# Patient Record
Sex: FEMALE | Race: WHITE | NOT HISPANIC OR LATINO | Employment: OTHER | URBAN - METROPOLITAN AREA
[De-identification: names, ages, dates, MRNs, and addresses within clinical notes are randomized per-mention and may not be internally consistent; named-entity substitution may affect disease eponyms.]

---

## 2017-12-12 ENCOUNTER — HISTORICAL (OUTPATIENT)
Dept: ADMINISTRATIVE | Facility: HOSPITAL | Age: 62
End: 2017-12-12

## 2018-03-01 ENCOUNTER — HISTORICAL (OUTPATIENT)
Dept: LAB | Facility: HOSPITAL | Age: 63
End: 2018-03-01

## 2018-03-01 LAB
ABS NEUT (OLG): 3.93 X10(3)/MCL (ref 2.1–9.2)
APTT PPP: 27.5 SECOND(S) (ref 24.8–36.9)
BASOPHILS # BLD AUTO: 0 X10(3)/MCL (ref 0–0.2)
BASOPHILS NFR BLD AUTO: 0 %
EOSINOPHIL # BLD AUTO: 0.1 X10(3)/MCL (ref 0–0.9)
EOSINOPHIL NFR BLD AUTO: 1 %
ERYTHROCYTE [DISTWIDTH] IN BLOOD BY AUTOMATED COUNT: 12.1 % (ref 11.5–17)
HCT VFR BLD AUTO: 40.3 % (ref 37–47)
HGB BLD-MCNC: 13.1 GM/DL (ref 12–16)
INR PPP: 1.07 (ref 0–1.27)
LYMPHOCYTES # BLD AUTO: 1.9 X10(3)/MCL (ref 0.6–4.6)
LYMPHOCYTES NFR BLD AUTO: 29 %
MCH RBC QN AUTO: 32 PG (ref 27–31)
MCHC RBC AUTO-ENTMCNC: 32.5 GM/DL (ref 33–36)
MCV RBC AUTO: 98.3 FL (ref 80–94)
MONOCYTES # BLD AUTO: 0.5 X10(3)/MCL (ref 0.1–1.3)
MONOCYTES NFR BLD AUTO: 8 %
NEUTROPHILS # BLD AUTO: 3.93 X10(3)/MCL (ref 1.4–7.9)
NEUTROPHILS NFR BLD AUTO: 61 %
PLATELET # BLD AUTO: 122 X10(3)/MCL (ref 130–400)
PMV BLD AUTO: 12.3 FL (ref 9.4–12.4)
PROTHROMBIN TIME: 14.2 SECOND(S) (ref 12.2–14.7)
RBC # BLD AUTO: 4.1 X10(6)/MCL (ref 4.2–5.4)
WBC # SPEC AUTO: 6.4 X10(3)/MCL (ref 4.5–11.5)

## 2018-03-05 ENCOUNTER — HISTORICAL (OUTPATIENT)
Dept: ADMINISTRATIVE | Facility: HOSPITAL | Age: 63
End: 2018-03-05

## 2019-06-03 ENCOUNTER — HISTORICAL (OUTPATIENT)
Dept: ANESTHESIOLOGY | Facility: HOSPITAL | Age: 64
End: 2019-06-03

## 2019-10-01 ENCOUNTER — HISTORICAL (OUTPATIENT)
Dept: URGENT CARE | Facility: CLINIC | Age: 64
End: 2019-10-01

## 2019-10-01 LAB
BILIRUB SERPL-MCNC: NEGATIVE MG/DL
BLOOD URINE, POC: NEGATIVE
CLARITY, POC UA: CLEAR
COLOR, POC UA: YELLOW
GLUCOSE UR QL STRIP: NEGATIVE
KETONES UR QL STRIP: NEGATIVE
LEUKOCYTE EST, POC UA: NORMAL
NITRITE, POC UA: NEGATIVE
PH, POC UA: 5
PROTEIN, POC: NORMAL
SPECIFIC GRAVITY, POC UA: 1.03
UROBILINOGEN, POC UA: NORMAL

## 2020-01-14 ENCOUNTER — HISTORICAL (OUTPATIENT)
Dept: ADMINISTRATIVE | Facility: HOSPITAL | Age: 65
End: 2020-01-14

## 2020-02-19 ENCOUNTER — HISTORICAL (OUTPATIENT)
Dept: SURGERY | Facility: HOSPITAL | Age: 65
End: 2020-02-19

## 2022-04-11 ENCOUNTER — HISTORICAL (OUTPATIENT)
Dept: ADMINISTRATIVE | Facility: HOSPITAL | Age: 67
End: 2022-04-11
Payer: MEDICARE

## 2022-04-29 VITALS
SYSTOLIC BLOOD PRESSURE: 118 MMHG | OXYGEN SATURATION: 95 % | HEIGHT: 60 IN | WEIGHT: 161.38 LBS | DIASTOLIC BLOOD PRESSURE: 76 MMHG | BODY MASS INDEX: 31.68 KG/M2

## 2022-04-30 NOTE — OP NOTE
Patient:   Lachelle Alanis            MRN: [id number]            FIN: [id number]               Age:   64 years     Sex:  Female     :  1955   Associated Diagnoses:   Primary osteoarthritis of right hip   Author:   Alexey Wayne MD      Operative Note   Operative Information   Date/ Time:  10/15/2018 07:38:00.     Procedures Performed: Right hip injection using fluoroscopy.     Preoperative Diagnosis: Primary osteoarthritis of right hip ([id number] M16.11).     Postoperative Diagnosis: Primary osteoarthritis of right hip ([id number] M16.11).     Surgeon: Alexey Wayne MD.     Anesthesia: concious sedation.     Description of Procedure/Findings/    Complications: Patient was involved who complains of ongoing right hip pain.  We discussed all treatment options.  Patient has a history of arthritis to the right hip.  Patient has tried and failed all measures.  The risk, benefits, alternatives to injection of right hip under anesthesia were discussed in detail including but limited to infection, bleeding, scarring, need for revision surgery, and resolution of pain.  Despite these risks patient elected to proceed with surgical mention.    Patient seen in preoperative holding.  The risk benefits alternatives again discussed.  All questions answered no guarantees made.  Patient was marked and consented.  Was taken the operating room.  placed under conscious sedation.  Using fluoroscopic guidance, the insertion site was localized.  It was injected with 3-4 cc 1% lidocaine.  Afterwards an 18-gauge spinal needle was then inserted using fluoroscopic guidance to the superior anterior aspect of the right hip/acetabulum.  2-3 cc of Isovue contrast was then injected.  We had an excellent arthrogram.  Afterwards 2 cc 1% lidocaine and 12 mg of betamethasone was injected into the right hip joint.  Needle was removed.  Band-Aid was placed over the injection site.  Patient arose from anesthesia without issue.  Was  transferred to recovery room in stable condition. postop he will be weightbearing as tolerated.  Patient will be discharged home.  .     Esimated blood loss: loss  10  cc.

## 2022-04-30 NOTE — OP NOTE
DATE OF SURGERY:    03/05/2018    SURGEON:  Alexey Wayne MD    PREOPERATIVE DIAGNOSIS:  Right hip labrum tear.    POSTOPERATIVE DIAGNOSIS:  Right hip labrum tear.    PROCEDURE:  Injection under anesthesia with arthrogram right hip.    INDICATION FOR PROCEDURE:  Patient is a 62-year-old female who presented to my clinic complaining of hip pain.  MRI revealed a labral tear.  Patient had tried and failed all conservative measurements including anti-inflammatory, activity modification,  as well as physical therapy.  She continued to have significant right hip pain.  We discussed treatment options including injection of her right hip labrum to try to calm this groin pain down.  The risks, benefits and alternatives of injection under anesthesia were arthrogram were discussed with the patient.  All questions were answered.  No guarantees were made.  Despite these risks, he elected to proceed with surgical intervention.    PROCEDURE IN DETAIL:  Patient was seen preoperatively in the preoperative holding area.  She was marked and consented for surgery.  She was taken to the operating room and placed under MAC anesthesia.  Right hip was prepped in a normal sterile fashion.  A time out was performed verifying correct patient, site side and procedure.  All were in agreement.  Using x-ray guidance, an 18-gauge spinal needle was inserted after using local anesthetic on the skin was inserted on the anterior femoral neck near the superior aspect of the hip capsule.  1 cc of Isovue were then injected grading an arthrogram.  We got excellent arthrogram with no significant arthritic changes noted afterwards.  12 mg of Betamethasone and 3 cc of 1% Lidocaine were inserted into her hip joint.  Patient tolerated the procedure without any issue.  The needle was removed.  A Band-Aid was placed.  Patient tolerated the procedure without any issues, reversed from anesthesia without any issues.  She was transferred to recovery room  in stable condition, where she will be discharged home.  She will follow up with me in one to two weeks to go over her results.        ______________________________  MD MIRNA Sims/SUN  DD:  03/05/2018  Time:  08:18AM  DT:  03/06/2018  Time:  10:24AM  Job #:  [id number]

## 2022-06-16 RX ORDER — PANTOPRAZOLE SODIUM 20 MG/1
20 TABLET, DELAYED RELEASE ORAL DAILY
COMMUNITY

## 2022-06-16 RX ORDER — METFORMIN HYDROCHLORIDE 500 MG/1
500 TABLET ORAL 2 TIMES DAILY WITH MEALS
Status: ON HOLD | COMMUNITY
End: 2022-12-29 | Stop reason: HOSPADM

## 2022-06-16 RX ORDER — LEVOTHYROXINE SODIUM 88 UG/1
88 TABLET ORAL
COMMUNITY
End: 2023-08-28

## 2022-06-16 RX ORDER — FENOFIBRATE 54 MG/1
54 TABLET ORAL DAILY
COMMUNITY

## 2022-06-16 RX ORDER — SERTRALINE HYDROCHLORIDE 100 MG/1
100 TABLET, FILM COATED ORAL DAILY
COMMUNITY

## 2022-06-16 RX ORDER — GLIMEPIRIDE 2 MG/1
4 TABLET ORAL
COMMUNITY

## 2022-06-16 RX ORDER — ROSUVASTATIN CALCIUM 10 MG/1
10 TABLET, COATED ORAL DAILY
COMMUNITY

## 2022-06-16 RX ORDER — NIACIN 500 MG
1000 CAPSULE, EXTENDED RELEASE ORAL NIGHTLY
COMMUNITY

## 2022-06-16 RX ORDER — OMEGA-3-ACID ETHYL ESTERS 1 G/1
2 CAPSULE, LIQUID FILLED ORAL 2 TIMES DAILY
Status: ON HOLD | COMMUNITY
End: 2022-12-29 | Stop reason: HOSPADM

## 2022-06-16 RX ORDER — METOPROLOL SUCCINATE 25 MG/1
37.5 TABLET, EXTENDED RELEASE ORAL 2 TIMES DAILY
COMMUNITY

## 2022-06-16 RX ORDER — ALENDRONATE SODIUM 70 MG/1
70 TABLET ORAL
COMMUNITY
End: 2023-08-28

## 2022-06-20 DIAGNOSIS — M54.2 CERVICALGIA: Primary | ICD-10-CM

## 2022-06-21 ENCOUNTER — OFFICE VISIT (OUTPATIENT)
Dept: NEUROSURGERY | Facility: CLINIC | Age: 67
End: 2022-06-21
Payer: MEDICARE

## 2022-06-21 VITALS
SYSTOLIC BLOOD PRESSURE: 120 MMHG | DIASTOLIC BLOOD PRESSURE: 70 MMHG | RESPIRATION RATE: 18 BRPM | HEIGHT: 60 IN | WEIGHT: 153.63 LBS | BODY MASS INDEX: 30.16 KG/M2

## 2022-06-21 DIAGNOSIS — M47.22 CERVICAL SPONDYLOSIS WITH RADICULOPATHY: ICD-10-CM

## 2022-06-21 DIAGNOSIS — M50.223 HERNIATED NUCLEUS PULPOSUS, C6-7: Primary | ICD-10-CM

## 2022-06-21 PROCEDURE — 99203 OFFICE O/P NEW LOW 30 MIN: CPT | Mod: ,,, | Performed by: PHYSICIAN ASSISTANT

## 2022-06-21 PROCEDURE — 99203 PR OFFICE/OUTPT VISIT, NEW, LEVL III, 30-44 MIN: ICD-10-PCS | Mod: ,,, | Performed by: PHYSICIAN ASSISTANT

## 2022-06-21 RX ORDER — LANOLIN ALCOHOL/MO/W.PET/CERES
1000 CREAM (GRAM) TOPICAL
COMMUNITY

## 2022-06-21 RX ORDER — UBIDECARENONE 30 MG
200 CAPSULE ORAL DAILY
COMMUNITY

## 2022-06-21 RX ORDER — IBUPROFEN 100 MG/5ML
1000 SUSPENSION, ORAL (FINAL DOSE FORM) ORAL 2 TIMES DAILY
Status: ON HOLD | COMMUNITY
End: 2022-12-29 | Stop reason: HOSPADM

## 2022-06-21 RX ORDER — ERGOCALCIFEROL 1.25 MG/1
50000 CAPSULE ORAL
COMMUNITY
End: 2022-12-20 | Stop reason: CLARIF

## 2022-06-21 RX ORDER — NAPROXEN SODIUM 220 MG/1
162 TABLET, FILM COATED ORAL DAILY
Status: ON HOLD | COMMUNITY
End: 2022-12-29 | Stop reason: HOSPADM

## 2022-06-21 RX ORDER — DILTIAZEM HYDROCHLORIDE 120 MG/1
180 CAPSULE, EXTENDED RELEASE ORAL DAILY
COMMUNITY
Start: 2022-01-25 | End: 2023-08-28

## 2022-06-21 RX ORDER — VITAMIN E 268 MG
400 CAPSULE ORAL DAILY
Status: ON HOLD | COMMUNITY
End: 2022-12-29 | Stop reason: HOSPADM
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
## 2022-06-29 ENCOUNTER — HOSPITAL ENCOUNTER (OUTPATIENT)
Dept: RADIOLOGY | Facility: HOSPITAL | Age: 67
Discharge: HOME | End: 2022-06-29
Attending: PHYSICIAN ASSISTANT
Payer: MEDICARE

## 2022-06-29 DIAGNOSIS — M50.223 HERNIATED NUCLEUS PULPOSUS, C6-7: ICD-10-CM

## 2022-06-29 DIAGNOSIS — M47.22 CERVICAL SPONDYLOSIS WITH RADICULOPATHY: ICD-10-CM

## 2022-06-29 PROCEDURE — 72125 CT NECK SPINE W/O DYE: CPT | Mod: TC

## 2022-07-01 ENCOUNTER — TELEPHONE (OUTPATIENT)
Dept: NEUROSURGERY | Facility: HOSPITAL | Age: 67
End: 2022-07-01
Payer: MEDICARE

## 2022-07-01 DIAGNOSIS — Z78.0 ENCOUNTER FOR OSTEOPOROSIS SCREENING IN ASYMPTOMATIC POSTMENOPAUSAL PATIENT: Primary | ICD-10-CM

## 2022-07-01 DIAGNOSIS — Z13.820 ENCOUNTER FOR OSTEOPOROSIS SCREENING IN ASYMPTOMATIC POSTMENOPAUSAL PATIENT: Primary | ICD-10-CM

## 2022-07-01 NOTE — TELEPHONE ENCOUNTER
I spoke to Dr. Melissa about the patient this week.  He would like to see Cervical x-rays with flex/ext views (ordered), she had the cervical CT done, and bone density.      I discussed this with the pateint.  She would like to go to [facility] for x-rays and bone density testing.  Please arrange prior to her follow up appt.

## 2022-07-05 NOTE — TELEPHONE ENCOUNTER
Faxed XR and BD orders to [facility] with the request that they please be done closer to patient's follow up appt on 08/17.

## 2022-07-14 DIAGNOSIS — R13.10 TROUBLE SWALLOWING: ICD-10-CM

## 2022-07-14 DIAGNOSIS — R05.3 CHRONIC COUGH: Primary | ICD-10-CM

## 2022-07-14 DIAGNOSIS — R47.02 DYSPHASIA: ICD-10-CM

## 2022-07-19 PROCEDURE — 83520 IMMUNOASSAY QUANT NOS NONAB: CPT | Performed by: INTERNAL MEDICINE

## 2022-07-20 ENCOUNTER — LAB REQUISITION (OUTPATIENT)
Dept: LAB | Facility: HOSPITAL | Age: 67
End: 2022-07-20
Payer: MEDICARE

## 2022-07-20 ENCOUNTER — CLINICAL SUPPORT (OUTPATIENT)
Dept: REHABILITATION | Facility: HOSPITAL | Age: 67
End: 2022-07-20
Attending: INTERNAL MEDICINE
Payer: MEDICARE

## 2022-07-20 ENCOUNTER — HOSPITAL ENCOUNTER (OUTPATIENT)
Dept: RADIOLOGY | Facility: HOSPITAL | Age: 67
Discharge: HOME | End: 2022-07-20
Attending: INTERNAL MEDICINE
Payer: MEDICARE

## 2022-07-20 DIAGNOSIS — R13.10 TROUBLE SWALLOWING: ICD-10-CM

## 2022-07-20 DIAGNOSIS — R47.02 DYSPHASIA: ICD-10-CM

## 2022-07-20 DIAGNOSIS — R05.3 CHRONIC COUGH: ICD-10-CM

## 2022-07-20 DIAGNOSIS — R13.10 DYSPHAGIA, UNSPECIFIED TYPE: ICD-10-CM

## 2022-07-20 DIAGNOSIS — K92.1 MELENA: ICD-10-CM

## 2022-07-20 DIAGNOSIS — R19.7 DIARRHEA, UNSPECIFIED: ICD-10-CM

## 2022-07-20 PROCEDURE — 74230 X-RAY XM SWLNG FUNCJ C+: CPT | Mod: TC

## 2022-07-20 PROCEDURE — 92611 MOTION FLUOROSCOPY/SWALLOW: CPT

## 2022-07-20 NOTE — PROGRESS NOTES
Speech Language Pathology Department  Modified Barium Swallow Study    Patient Name:  Lachelle Alanis   MRN:  [id number]    Recommendations:     General Recommendations:  SLP intervention not indicated  Repeat MBS study: as warranted   Diet recommendations:  Regular and thin   Swallow Strategies/Precautions: small bites/sips and slow rate  General Precautions: Standard,      History:     A Modified Barium Swallow Study was completed to assess the efficiency of his/her swallow function, rule out aspiration and make recommendations regarding safe dietary consistencies, effective compensatory strategies, and safe eating environment.     Past Medical History:   Diagnosis Date    Anemia     Anxiety     Cervical pain     Depression     Diabetes     GERD (gastroesophageal reflux disease)     Hyperlipidemia     Unspecified osteoarthritis, unspecified site        Home Diet: Regular and thin liquids  Current Method of Nutrition: PO diet      Patient complaint: coughing with intake     Subjective:     Patient awake and alert.    Respiratory Status: room air     Radiology Staff Present: Dr Leong    Fluoroscopic Results:     Visualization  · Patient was seen in the lateral view  · Patient was seen in the anterior view    Oral Phase:    WFL    Pharyngeal Phase:    WFL  Consistency Laryngeal Penetration Aspiration Residue Strategy   mildly thick liquids  none none     Thin liquids via cup and straw none none     puree none none     Solids  none none                       Assessment:     No laryngeal penetration or aspiration visualized. No skilled SLP intervention is warranted at this time.                   Goals:     If vocal hoarseness persists may want to consider ENT consult.   Plan:     Patient to be seen:      Plan of Care expires:     Plan of Care reviewed with:      SLP Follow-Up:         Time Tracking:     SLP Treatment Date:      Speech Start Time:     Speech Stop Time:        Speech Total Time (min):        Billable minutes: Motion Fluoroscopic Evaluation, Video Recording,         07/20/2022

## 2022-07-23 LAB — ELASTASE PANC STL-MCNT: 198 MCG/G

## 2022-08-17 ENCOUNTER — OFFICE VISIT (OUTPATIENT)
Dept: NEUROSURGERY | Facility: CLINIC | Age: 67
End: 2022-08-17
Payer: MEDICARE

## 2022-08-17 VITALS
WEIGHT: 156.19 LBS | BODY MASS INDEX: 30.67 KG/M2 | HEIGHT: 60 IN | SYSTOLIC BLOOD PRESSURE: 125 MMHG | HEART RATE: 66 BPM | DIASTOLIC BLOOD PRESSURE: 70 MMHG | RESPIRATION RATE: 16 BRPM

## 2022-08-17 DIAGNOSIS — M47.22 CERVICAL SPONDYLOSIS WITH RADICULOPATHY: Primary | ICD-10-CM

## 2022-08-17 PROCEDURE — 99213 PR OFFICE/OUTPT VISIT, EST, LEVL III, 20-29 MIN: ICD-10-PCS | Mod: ,,, | Performed by: PHYSICIAN ASSISTANT

## 2022-08-17 PROCEDURE — 99213 OFFICE O/P EST LOW 20 MIN: CPT | Mod: ,,, | Performed by: PHYSICIAN ASSISTANT

## 2022-08-17 NOTE — PROGRESS NOTES
[facility]  History & Physical  Neurosurgery      Lachelle Alanis   [id number]   1955       CHIEF COMPLAINT:  Neck pain    HPI:  Lachelle Alanis is a 66 y.o. female who presents for neurosurgical evaluation.  The patient continues with neck pain.  Today, she rates her pain at 6/10.  Yesterday, the neck pain was severe and produced a headache.  She also has pain that radiates into the trapezius muscles, left shoulder, and lateral upper arm.  When she was last seen on 6/24/2022, she denied pain in either upper extremity.  She did not associate this pain with her neck.  Her neck pain is the main complaint.  It has been progressively worsening especially after she lifted her grandchild 4-5 months ago.  She is very frustrated with her situation.  She has a great deal of pain when she performs activities that require a lot of cervical range of motion, ie. Shopping.  She is ready to explore surgical options.  She returns today with updated bone density and cervical CT.    Of note, she underwent MBS due to hoarseness.  This was ordered by Dr. Marielle Leigh.  She has hoarseness all of the time.  In addition, she tends to choke of drinks and saliva.  The MBS reveal she has normal swallowing function.  It was recommended she been seen by ENT.  She has had the hoarseness since she underwent thyroidectomy.      Past Medical History:   Diagnosis Date    Anemia     Anxiety     Cervical pain     Depression     Diabetes     GERD (gastroesophageal reflux disease)     Hyperlipidemia     Postoperative hypothyroidism     Unspecified osteoarthritis, unspecified site        Past Surgical History:   Procedure Laterality Date    AUGMENTATION OF BREAST      bladder lift      CARPAL TUNNEL RELEASE      HYSTERECTOMY      THYROIDECTOMY      TONSILLECTOMY         Family History   Problem Relation Age of Onset    Diabetes Mellitus Mother     Heart disease Mother     Hyperlipidemia Mother     Cancer Mother      Diabetes Mellitus Father     Heart disease Father     Hypertension Father        Social History     Socioeconomic History    Marital status:    Tobacco Use    Smoking status: Former Smoker     Packs/day: 0.50     Years: 30.00     Pack years: 15.00    Smokeless tobacco: Never Used   Substance and Sexual Activity    Alcohol use: Not Currently    Drug use: Not Currently       Current Outpatient Medications   Medication Sig Dispense Refill    alendronate (FOSAMAX) 70 MG tablet Take 70 mg by mouth every 7 days.      ascorbic acid, vitamin C, (VITAMIN C) 1000 MG tablet Take 1,000 mg by mouth 2 (two) times daily.      aspirin 81 MG Chew Take 81 mg by mouth once daily.      CARTIA  mg Cp24 Take 120 mg by mouth once daily.      co-enzyme Q-10 30 mg capsule Take 100 mg by mouth 2 (two) times daily.      cyanocobalamin (VITAMIN B-12) 1000 MCG tablet Take 100 mcg by mouth once daily.      ergocalciferol (ERGOCALCIFEROL) 50,000 unit Cap Take 50,000 Units by mouth every 7 days.      fenofibrate (TRICOR) 54 MG tablet Take 54 mg by mouth once daily.      glimepiride (AMARYL) 2 MG tablet Take 2 mg by mouth before breakfast.      glucosamine/chondroitin/C/Farzad (GLUCOSAMINE 1500 COMPLEX ORAL) Take by mouth.      levothyroxine (SYNTHROID) 88 MCG tablet Take 88 mcg by mouth before breakfast.      metFORMIN (GLUCOPHAGE) 500 MG tablet Take 500 mg by mouth 2 (two) times daily with meals.      metoprolol succinate (TOPROL-XL) 25 MG 24 hr tablet Take 25 mg by mouth once daily.      niacin 500 MG CpSR Take 250 mg by mouth every evening.      omega-3 acid ethyl esters (LOVAZA) 1 gram capsule Take 2 g by mouth 2 (two) times daily.      pantoprazole (PROTONIX) 20 MG tablet Take 20 mg by mouth once daily.      rosuvastatin (CRESTOR) 10 MG tablet Take 10 mg by mouth once daily.      sertraline (ZOLOFT) 100 MG tablet Take 100 mg by mouth once daily.      vitamin E 400 UNIT capsule Take 400 Units by mouth  once daily.      ascorbic acid-multivit-min 1,000 mg PwEP Take by mouth.       No current facility-administered medications for this visit.       Review of patient's allergies indicates:  No Known Allergies       Review of Systems   Constitutional: Negative for chills and fever.   HENT: Negative for nosebleeds and sore throat.    Eyes: Negative for pain and visual disturbance.   Respiratory: Negative for cough, chest tightness and shortness of breath.    Cardiovascular: Negative for chest pain.   Gastrointestinal: Negative for diarrhea, nausea and vomiting.   Genitourinary: Negative for difficulty urinating, dysuria and hematuria.   Musculoskeletal: Positive for neck pain. Negative for gait problem and myalgias.   Skin: Negative for rash.   Neurological: Negative for dizziness, facial asymmetry and headaches.   Psychiatric/Behavioral: Negative for confusion and sleep disturbance. The patient is not nervous/anxious.        Physical Examination:    /70 (BP Location: Other (Comment), Patient Position: Sitting)   Pulse 66   Resp 16   Ht 5' (1.524 m)   Wt 70.9 kg (156 lb 3.2 oz)   BMI 30.51 kg/m²     General:  Pleasant. Well-nourished. Well-groomed.    Lungs:  Breathing is quiet, non-labored     MMusculoskeletal:  Cervical spine palpation:  Moderately limited with extension.  Pain is increased with left rotation.  Tinel's is negative at bilateral wrist and elbows.  Spurling's maneuver is negative bilaterally.     Neurological:    Oriented to Person, Place, Time   Muscle strength against resistance:  Strength   Deltoids Triceps Biceps Wrist Extension Wrist Flexion Hand    Upper: R 5/5 5/5 5/5 5/5 5/5 5/5     L 5/5 5/5 5/5 5/5 5/5 5/5   Sensation is intact to primary modalities in bilateral upper extremities   Reflexes:    Left Right   Triceps 2+ 2+   Biceps 1+ 2+   Brachioradialis 1+ 1+   Parra's sign is negative bilaterally.  Gait: normal  Coordination is normal     Imaging:  MRI of the cervical spine  was obtained on 03/08/2022.  At C5-6, there is central disc herniation which causes moderate central stenosis.  There is no foraminal stenosis.  At C6-7, there is central disc herniation that causes moderately severe central stenosis, but no foraminal stenosis.  At C7-T1, there is a small central disc herniation without central or foraminal stenosis.  Bone density obtained on 7/6/2022 revealed osteopenia.  The numbers have improved compared to prior study.  Cervical CT was obtained on 6/26/2022.  The study reveals there is calcified disk at C5-6 and C6-7.        ASSESSMENT/PLAN:     1. Cervical spondylosis with radiculopathy          Options were discussed with the patient.  She feels she has exhausted conservative measures.  I will discuss the patient and her situation with Dr. Melissa.  I will call her with his recommendation.      A total of 21 minutes was spent face-to-face with the patient during this encounter.  Over half of that time was spent on counseling and coordination of care.

## 2022-09-14 ENCOUNTER — TELEPHONE (OUTPATIENT)
Dept: NEUROSURGERY | Facility: CLINIC | Age: 67
End: 2022-09-14
Payer: MEDICARE

## 2022-09-15 NOTE — TELEPHONE ENCOUNTER
I discussed the patient with Dr. Melissa.  She needs cervical x-rays with flex/ext views and bone density.  Follow up my schedule on day Belén is here.    
If she already as the studies, she does not need to repeat.  
all other ROS negative except as per HPI

## 2022-09-22 ENCOUNTER — HISTORICAL (OUTPATIENT)
Dept: ADMINISTRATIVE | Facility: HOSPITAL | Age: 67
End: 2022-09-22
Payer: MEDICARE

## 2022-10-26 ENCOUNTER — OFFICE VISIT (OUTPATIENT)
Dept: NEUROSURGERY | Facility: CLINIC | Age: 67
End: 2022-10-26
Payer: MEDICARE

## 2022-10-26 VITALS
SYSTOLIC BLOOD PRESSURE: 131 MMHG | RESPIRATION RATE: 17 BRPM | HEIGHT: 60 IN | DIASTOLIC BLOOD PRESSURE: 73 MMHG | HEART RATE: 65 BPM | WEIGHT: 157 LBS | BODY MASS INDEX: 30.82 KG/M2

## 2022-10-26 DIAGNOSIS — M47.22 CERVICAL SPONDYLOSIS WITH RADICULOPATHY: Primary | ICD-10-CM

## 2022-10-26 PROCEDURE — 99215 OFFICE O/P EST HI 40 MIN: CPT | Mod: ,,, | Performed by: PHYSICIAN ASSISTANT

## 2022-10-26 PROCEDURE — 99215 PR OFFICE/OUTPT VISIT, EST, LEVL V, 40-54 MIN: ICD-10-PCS | Mod: ,,, | Performed by: PHYSICIAN ASSISTANT

## 2022-10-26 NOTE — PROGRESS NOTES
[facility]  [facility] & Physical  Neurosurgery      Lachelle Alanis   [id number]   1955       CHIEF COMPLAINT:  Neck pain    HPI:  Lachelle Alanis is a 67 y.o. female who presents for follow up appointment with cervical x-rays with flexion and extension views.  She complains of constant neck pain with pain radiating up the back of her head.  She also has pain traveling into bilateral trapezius muscles.  Intermittently, she has pain along the right lateral upper arm and dorsal forearm.  There is no numbness or tingling in either upper extremity.  Subjectively, she has weakness in bilateral hands.  She has a history of trigger finger release at the right 3rd finger.  After the procedure, she developed Dupuytren's contracture.  She also has a history of bilateral carpal tunnel release.  She feels she waited too long to have the procedure and was left with weakness in her hands.  In addition, she has had prior ulnar nerve transposition on the left with Dr. Uriarte.  She has increased sensitivity at the medial elbow.  When she presses on the scar, it produces a tingling sensation.    Today, her pain level is rated at 2/10.  Yet, in the past couple of weeks, her neck pain was terrible.  The neck pain is increased with movement especially when she is grocery shopping.  Activity increases her pain.  She has experienced exacerbations of neck pain with activities such as lifting her child and caring a lawn chair.  When lying, she has a very difficult time falling asleep because she is unable to find a comfortable position.  She has undergone multiple courses of physical therapy.  She does not feel they provided any benefit.  The pain has progressively worsened over the years.  Due to the failure of conservative measures in providing satisfactory relief, she return to discuss surgical options.    Of note, she underwent MBS due to hoarseness.  This was ordered by Dr. Marielle Leigh.  She has hoarseness all of  the time.  In addition, she tends to choke of drinks and saliva.  The MBS reveal she has normal swallowing function.  It was recommended she been seen by ENT.  She has had the hoarseness since she underwent thyroidectomy.      Past Medical History:   Diagnosis Date    Anemia     Anxiety     Cervical pain     Depression     Diabetes     GERD (gastroesophageal reflux disease)     Heart palpitations     Hyperlipidemia     Postoperative hypothyroidism     Unspecified osteoarthritis, unspecified site        Past Surgical History:   Procedure Laterality Date    AUGMENTATION OF BREAST      bladder lift      CARPAL TUNNEL RELEASE      HYSTERECTOMY      THYROIDECTOMY      TONSILLECTOMY         Family History   Problem Relation Age of Onset    Diabetes Mellitus Mother     Heart disease Mother     Hyperlipidemia Mother     Cancer Mother     Diabetes Mellitus Father     Heart disease Father     Hypertension Father        Social History     Socioeconomic History    Marital status:    Tobacco Use    Smoking status: Former     Packs/day: 0.50     Years: 30.00     Pack years: 15.00     Types: Cigarettes    Smokeless tobacco: Never   Substance and Sexual Activity    Alcohol use: Not Currently    Drug use: Not Currently       Current Outpatient Medications   Medication Sig Dispense Refill    alendronate (FOSAMAX) 70 MG tablet Take 70 mg by mouth every 7 days.      ascorbic acid, vitamin C, (VITAMIN C) 1000 MG tablet Take 1,000 mg by mouth 2 (two) times daily.      ascorbic acid-multivit-min 1,000 mg PwEP Take by mouth.      aspirin 81 MG Chew Take 81 mg by mouth once daily.      CARTIA  mg Cp24 Take 120 mg by mouth once daily.      co-enzyme Q-10 30 mg capsule Take 100 mg by mouth 2 (two) times daily.      cyanocobalamin (VITAMIN B-12) 1000 MCG tablet Take 100 mcg by mouth once daily.      ergocalciferol (ERGOCALCIFEROL) 50,000 unit Cap Take 50,000 Units by mouth every 7 days.      fenofibrate (TRICOR) 54 MG tablet Take  54 mg by mouth once daily.      glimepiride (AMARYL) 2 MG tablet Take 2 mg by mouth before breakfast.      glucosamine/chondroitin/C/Farzad (GLUCOSAMINE 1500 COMPLEX ORAL) Take by mouth.      levothyroxine (SYNTHROID) 88 MCG tablet Take 88 mcg by mouth before breakfast.      metFORMIN (GLUCOPHAGE) 500 MG tablet Take 500 mg by mouth 2 (two) times daily with meals.      metoprolol succinate (TOPROL-XL) 25 MG 24 hr tablet Take 50 mg by mouth 2 (two) times daily.      niacin 500 MG CpSR Take 250 mg by mouth every evening.      omega-3 acid ethyl esters (LOVAZA) 1 gram capsule Take 2 g by mouth 2 (two) times daily.      pantoprazole (PROTONIX) 20 MG tablet Take 20 mg by mouth once daily.      rosuvastatin (CRESTOR) 10 MG tablet Take 10 mg by mouth once daily.      sertraline (ZOLOFT) 100 MG tablet Take 100 mg by mouth once daily.      vitamin E 400 UNIT capsule Take 400 Units by mouth once daily.       No current facility-administered medications for this visit.       Review of patient's allergies indicates:  No Known Allergies       Review of Systems   Constitutional:  Negative for chills and fever.   HENT:  Negative for nosebleeds and sore throat.    Eyes:  Negative for pain and visual disturbance.   Respiratory:  Negative for cough, chest tightness and shortness of breath.    Cardiovascular:  Negative for chest pain.   Gastrointestinal:  Negative for diarrhea, nausea and vomiting.   Genitourinary:  Negative for difficulty urinating, dysuria and hematuria.   Musculoskeletal:  Positive for neck pain. Negative for gait problem and myalgias.   Skin:  Negative for rash.   Neurological:  Negative for dizziness, facial asymmetry and headaches.   Psychiatric/Behavioral:  Negative for confusion and sleep disturbance. The patient is not nervous/anxious.      Physical Examination:    /73 (BP Location: Other (Comment), Patient Position: Sitting)   Pulse 65   Resp 17   Ht 5' (1.524 m)   Wt 71.2 kg (157 lb)   BMI 30.66  kg/m²       General:  Pleasant. Well-nourished. Well-groomed.    Lungs:  Breathing is quiet, non-labored     MMusculoskeletal:  Cervical spine palpation:  Moderately limited with extension.  Pain is increased with left rotation.  Tinel's is negative at bilateral wrist and elbows.  Spurling's maneuver is negative bilaterally.     Neurological:    Oriented to Person, Place, Time   Muscle strength against resistance:  Strength   Deltoids Triceps Biceps Wrist Extension Wrist Flexion Hand    Upper: R 5/5 5/5 5/5 5/5 5/5 5/5     L 5/5 5/5 5/5 5/5 5/5 5/5   Sensation is intact to primary modalities in bilateral upper extremities   Reflexes:    Left Right   Triceps 2+ 2+   Biceps 1+ 2+   Brachioradialis 1+ 1+   Parra's sign is negative bilaterally.  Gait: normal  Coordination is normal     Imaging:  MRI of the cervical spine was obtained on 03/08/2022.  At C5-6, there is central disc herniation which causes moderate central stenosis.  There is no foraminal stenosis.  At C6-7, there is central disc herniation that causes moderately severe central stenosis, but no foraminal stenosis.  At C7-T1, there is a small central disc herniation without central or foraminal stenosis.  Bone density obtained on 7/6/2022 revealed osteopenia.  The numbers have improved compared to prior study.  Cervical CT was obtained on 6/26/2022.  The study reveals there is calcified disk at C5-6 and C6-7.    Cervical x-rays with flexion and extension views were performed on 07/06/2022.  This study shows normal alignment.  There is mild disc space narrowing and spondylosis at C4-5, C5-6, and C6-7.  There is no abnormal motion with flexion or extension.    ASSESSMENT/PLAN:     1. Cervical spondylosis with radiculopathy          The patient was seen and examined by Dr. Melissa.  Options were discussed at length.  Dr. Melissa feels she will benefit from C5-6 and C6-7 total disc arthroplasty.  Risks, benefits, and possible complications were discussed.   All of her questions were answered.  Surgery is tentatively planned for 12/27/2022.      A total of 47 minutes was spent face-to-face with the patient during this encounter.  Over half of that time was spent on counseling and coordination of care.  Additional time was used to reviewed the patient's chart, cervical MRI, x-rays, and bone density, discussed with Dr. Melissa, and work on office note.

## 2022-12-12 ENCOUNTER — OFFICE VISIT (OUTPATIENT)
Dept: NEUROSURGERY | Facility: CLINIC | Age: 67
End: 2022-12-12
Payer: MEDICARE

## 2022-12-12 VITALS
WEIGHT: 158 LBS | HEART RATE: 68 BPM | SYSTOLIC BLOOD PRESSURE: 123 MMHG | RESPIRATION RATE: 16 BRPM | DIASTOLIC BLOOD PRESSURE: 71 MMHG | HEIGHT: 60 IN | BODY MASS INDEX: 31.02 KG/M2

## 2022-12-12 DIAGNOSIS — D64.9 ANEMIA, UNSPECIFIED TYPE: ICD-10-CM

## 2022-12-12 DIAGNOSIS — M47.22 CERVICAL SPONDYLOSIS WITH RADICULOPATHY: Primary | ICD-10-CM

## 2022-12-12 DIAGNOSIS — K21.9 GASTROESOPHAGEAL REFLUX DISEASE, UNSPECIFIED WHETHER ESOPHAGITIS PRESENT: ICD-10-CM

## 2022-12-12 DIAGNOSIS — E55.9 VITAMIN D DEFICIENCY: ICD-10-CM

## 2022-12-12 DIAGNOSIS — E11.9 TYPE 2 DIABETES MELLITUS WITHOUT COMPLICATION, WITHOUT LONG-TERM CURRENT USE OF INSULIN: ICD-10-CM

## 2022-12-12 PROCEDURE — 99213 PR OFFICE/OUTPT VISIT, EST, LEVL III, 20-29 MIN: ICD-10-PCS | Mod: ,,, | Performed by: NURSE PRACTITIONER

## 2022-12-12 PROCEDURE — 99213 OFFICE O/P EST LOW 20 MIN: CPT | Mod: ,,, | Performed by: NURSE PRACTITIONER

## 2022-12-12 RX ORDER — INSULIN GLARGINE 300 U/ML
58 INJECTION, SOLUTION SUBCUTANEOUS DAILY
COMMUNITY
End: 2023-08-28

## 2022-12-20 RX ORDER — DILTIAZEM HYDROCHLORIDE 180 MG/1
180 CAPSULE, COATED, EXTENDED RELEASE ORAL 2 TIMES DAILY
Status: ON HOLD | COMMUNITY
End: 2022-12-29 | Stop reason: HOSPADM

## 2022-12-20 RX ORDER — CHOLECALCIFEROL (VITAMIN D3) 125 MCG
5000 CAPSULE ORAL DAILY
COMMUNITY

## 2022-12-20 NOTE — PROGRESS NOTES
[facility]  Office Visit  Neurosurgery  Lachelle Alanis  [id number]  1955    HPI:  The patient presents today for pre-operative appointment.     Lachelle Alanis is a 67 y.o. female who presents for preoperative appointment.  She complains of constant neck pain with pain radiating up the back of her head.  She also has pain traveling into bilateral trapezius muscles.  Intermittently, she has pain along the right lateral upper arm and dorsal forearm.  There is no numbness or tingling in either upper extremity.  Subjectively, she has weakness in bilateral hands.  She has a history of trigger finger release at the right 3rd finger.  After the procedure, she developed Dupuytren's contracture.  She also has a history of bilateral carpal tunnel release.  She feels she waited too long to have the procedure and was left with weakness in her hands.  In addition, she has had prior ulnar nerve transposition on the left with Dr. Uriarte.  She has increased sensitivity at the medial elbow.  When she presses on the scar, it produces a tingling sensation.     Her pain waxes and wanes in severity. Some days, her neck pain is severe. The neck pain is increased with movement especially when she is grocery shopping.  Activity increases her pain.  She has experienced exacerbations of neck pain with activities such as lifting her child and caring a lawn chair.  When lying, she has a very difficult time falling asleep because she is unable to find a comfortable position.  She has undergone multiple courses of physical therapy.  She does not feel they provided any benefit.  The pain has progressively worsened over the years.  Due to the failure of conservative measures in providing satisfactory relief, surgery has been recommended.     Of note, she underwent MBS due to hoarseness.  This was ordered by Dr. Marielle Leigh.  She has hoarseness all of the time.  In addition, she tends to choke of drinks and saliva.  The MBS  reveal she has normal swallowing function.  It was recommended she been seen by ENT.  She has had the hoarseness since she underwent thyroidectomy.    Review of patient's allergies indicates:  No Known Allergies  Current Outpatient Medications   Medication Sig Dispense Refill    alendronate (FOSAMAX) 70 MG tablet Take 70 mg by mouth every 7 days.      ascorbic acid, vitamin C, (VITAMIN C) 1000 MG tablet Take 1,000 mg by mouth 2 (two) times daily.      ascorbic acid-multivit-min 1,000 mg PwEP Take by mouth.      aspirin 81 MG Chew Take 81 mg by mouth once daily.      CARTIA  mg Cp24 Take 120 mg by mouth once daily.      co-enzyme Q-10 30 mg capsule Take 100 mg by mouth 2 (two) times daily.      cyanocobalamin (VITAMIN B-12) 1000 MCG tablet Take 100 mcg by mouth once daily.      ergocalciferol (ERGOCALCIFEROL) 50,000 unit Cap Take 50,000 Units by mouth every 7 days.      fenofibrate (TRICOR) 54 MG tablet Take 54 mg by mouth once daily.      glimepiride (AMARYL) 2 MG tablet Take 2 mg by mouth before breakfast.      glucosamine/chondroitin/C/Farzad (GLUCOSAMINE 1500 COMPLEX ORAL) Take by mouth.      insulin glargine, TOUJEO, (TOUJEO SOLOSTAR U-300 INSULIN) 300 unit/mL (1.5 mL) InPn pen Inject into the skin once daily.      levothyroxine (SYNTHROID) 88 MCG tablet Take 88 mcg by mouth before breakfast.      metoprolol succinate (TOPROL-XL) 25 MG 24 hr tablet Take 50 mg by mouth 2 (two) times daily.      niacin 500 MG CpSR Take 250 mg by mouth every evening.      omega-3 acid ethyl esters (LOVAZA) 1 gram capsule Take 2 g by mouth 2 (two) times daily.      pantoprazole (PROTONIX) 20 MG tablet Take 20 mg by mouth once daily.      rosuvastatin (CRESTOR) 10 MG tablet Take 10 mg by mouth once daily.      sertraline (ZOLOFT) 100 MG tablet Take 100 mg by mouth once daily.      vitamin E 400 UNIT capsule Take 400 Units by mouth once daily.      metFORMIN (GLUCOPHAGE) 500 MG tablet Take 500 mg by mouth 2 (two) times daily with  meals.       No current facility-administered medications for this visit.     Patient Active Problem List    Diagnosis Date Noted    Cervical spondylosis with radiculopathy 06/21/2022     Past Medical History:   Diagnosis Date    Anemia     Anxiety     Cervical pain     Depression     Diabetes     GERD (gastroesophageal reflux disease)     Heart palpitations     Hyperlipidemia     Postoperative hypothyroidism     Unspecified osteoarthritis, unspecified site      Past Surgical History:   Procedure Laterality Date    ABDOMINAL SURGERY      AUGMENTATION OF BREAST      BLADDER SUSPENSION      CARPAL TUNNEL RELEASE Bilateral     HIP SURGERY Right     HYSTERECTOMY      vaginal; partial    THYROIDECTOMY      due to goiter, Hoshimoto's disease    TONSILLECTOMY      TRIGGER FINGER RELEASE      left 3rd finger     Social History     Tobacco Use    Smoking status: Former     Packs/day: 0.50     Years: 30.00     Pack years: 15.00     Types: Cigarettes    Smokeless tobacco: Never   Substance Use Topics    Alcohol use: Not Currently     Family History   Problem Relation Age of Onset    Diabetes Mellitus Mother     Heart disease Mother     Hyperlipidemia Mother     Cancer Mother     Diabetes Mellitus Father     Heart disease Father     Hypertension Father        Vital Signs  Pulse: 68  Resp: 16  BP: 123/71  BP Location: Other (Comment)  Patient Position: Sitting  Pain Score:   5  Height and Weight  Height: 5' (152.4 cm)  Weight: 71.7 kg (158 lb)  BSA (Calculated - sq m): 1.74 sq meters  BMI (Calculated): 30.9  Weight in (lb) to have BMI = 25: 127.7]    ROS:  Review of Systems   Constitutional:  Negative for chills and fever.   HENT:  Negative for nosebleeds and sore throat.    Eyes:  Negative for pain and visual disturbance.   Respiratory:  Negative for cough, chest tightness and shortness of breath.    Cardiovascular:  Negative for chest pain.   Gastrointestinal:  Negative for diarrhea, nausea and vomiting.   Genitourinary:   Negative for difficulty urinating, dysuria and hematuria.   Musculoskeletal:  Positive for neck pain. Negative for gait problem and myalgias.   Skin:  Negative for rash.   Neurological:  Negative for dizziness, facial asymmetry and headaches.   Psychiatric/Behavioral:  Negative for confusion and sleep disturbance. The patient is not nervous/anxious.      Vitals within last 24hrs:  Vitals:    12/12/22 1436   BP: 123/71   Pulse: 68   Resp: 16       Physical Exam:  General:  Pleasant. Well-nourished. Well-groomed.    Cardiac:  RRR, no carotid bruit     Lungs:  Breathing is quiet, non-labored      MMusculoskeletal:  Cervical spine palpation:  Moderately limited with extension.  Pain is increased with left rotation.  Tinel's is negative at bilateral wrist and elbows.  Spurling's maneuver is negative bilaterally.     Neurological:    Oriented to Person, Place, Time   Muscle strength against resistance:  Strength   Deltoids Triceps Biceps Wrist Extension Wrist Flexion Hand    Upper: R 5/5 5/5 5/5 5/5 5/5 5/5     L 5/5 5/5 5/5 5/5 5/5 5/5   Sensation is intact to primary modalities in bilateral upper extremities   Reflexes:    Left Right   Triceps 2+ 2+   Biceps 1+ 2+   Brachioradialis 1+ 1+   Parra's sign is negative bilaterally.  Gait: normal  Coordination is normal      Imaging:  MRI of the cervical spine was obtained on 03/08/2022.  At C5-6, there is central disc herniation which causes moderate central stenosis.  There is no foraminal stenosis.  At C6-7, there is central disc herniation that causes moderately severe central stenosis, but no foraminal stenosis.  At C7-T1, there is a small central disc herniation without central or foraminal stenosis.  Bone density obtained on 7/6/2022 revealed osteopenia.  The numbers have improved compared to prior study.  Cervical CT was obtained on 6/26/2022.  The study reveals there is calcified disk at C5-6 and C6-7.    Cervical x-rays with flexion and extension views were  performed on 07/06/2022.  This study shows normal alignment.  There is mild disc space narrowing and spondylosis at C4-5, C5-6, and C6-7.  There is no abnormal motion with flexion or extension.    Assessment/Plan:  Problem List Items Addressed This Visit          Neuro    Cervical spondylosis with radiculopathy - Primary    Relevant Orders    CBC Auto Differential    Comprehensive Metabolic Panel    X-Ray Chest PA And Lateral    EKG 12-lead    Vitamin D     Other Visit Diagnoses       Vitamin D deficiency        Relevant Orders    Vitamin D    Anemia, unspecified type        Relevant Orders    CBC Auto Differential    Type 2 diabetes mellitus without complication, without long-term current use of insulin        Relevant Medications    insulin glargine, TOUJEO, (TOUJEO SOLOSTAR U-300 INSULIN) 300 unit/mL (1.5 mL) InPn pen    Other Relevant Orders    Comprehensive Metabolic Panel    Gastroesophageal reflux disease, unspecified whether esophagitis present        Relevant Orders    X-Ray Chest PA And Lateral    EKG 12-lead            The nature of the procedure, as well as its attendant risks, were discussed in detail with the patient.  All questions were answered.  They are agreement with proceeding with surgery as planned, and are tentatively scheduled for C5-6, C6-7 TDR on 12/27/2022.        SEEMA Loya

## 2022-12-20 NOTE — H&P (VIEW-ONLY)
[facility]  Office Visit  Neurosurgery  Lachelle Alanis  [id number]  1955    HPI:  The patient presents today for pre-operative appointment.     Lachelle Alanis is a 67 y.o. female who presents for preoperative appointment.  She complains of constant neck pain with pain radiating up the back of her head.  She also has pain traveling into bilateral trapezius muscles.  Intermittently, she has pain along the right lateral upper arm and dorsal forearm.  There is no numbness or tingling in either upper extremity.  Subjectively, she has weakness in bilateral hands.  She has a history of trigger finger release at the right 3rd finger.  After the procedure, she developed Dupuytren's contracture.  She also has a history of bilateral carpal tunnel release.  She feels she waited too long to have the procedure and was left with weakness in her hands.  In addition, she has had prior ulnar nerve transposition on the left with Dr. Uriarte.  She has increased sensitivity at the medial elbow.  When she presses on the scar, it produces a tingling sensation.     Her pain waxes and wanes in severity. Some days, her neck pain is severe. The neck pain is increased with movement especially when she is grocery shopping.  Activity increases her pain.  She has experienced exacerbations of neck pain with activities such as lifting her child and caring a lawn chair.  When lying, she has a very difficult time falling asleep because she is unable to find a comfortable position.  She has undergone multiple courses of physical therapy.  She does not feel they provided any benefit.  The pain has progressively worsened over the years.  Due to the failure of conservative measures in providing satisfactory relief, surgery has been recommended.     Of note, she underwent MBS due to hoarseness.  This was ordered by Dr. Marielle Leigh.  She has hoarseness all of the time.  In addition, she tends to choke of drinks and saliva.  The MBS  reveal she has normal swallowing function.  It was recommended she been seen by ENT.  She has had the hoarseness since she underwent thyroidectomy.    Review of patient's allergies indicates:  No Known Allergies  Current Outpatient Medications   Medication Sig Dispense Refill    alendronate (FOSAMAX) 70 MG tablet Take 70 mg by mouth every 7 days.      ascorbic acid, vitamin C, (VITAMIN C) 1000 MG tablet Take 1,000 mg by mouth 2 (two) times daily.      ascorbic acid-multivit-min 1,000 mg PwEP Take by mouth.      aspirin 81 MG Chew Take 81 mg by mouth once daily.      CARTIA  mg Cp24 Take 120 mg by mouth once daily.      co-enzyme Q-10 30 mg capsule Take 100 mg by mouth 2 (two) times daily.      cyanocobalamin (VITAMIN B-12) 1000 MCG tablet Take 100 mcg by mouth once daily.      ergocalciferol (ERGOCALCIFEROL) 50,000 unit Cap Take 50,000 Units by mouth every 7 days.      fenofibrate (TRICOR) 54 MG tablet Take 54 mg by mouth once daily.      glimepiride (AMARYL) 2 MG tablet Take 2 mg by mouth before breakfast.      glucosamine/chondroitin/C/Farzad (GLUCOSAMINE 1500 COMPLEX ORAL) Take by mouth.      insulin glargine, TOUJEO, (TOUJEO SOLOSTAR U-300 INSULIN) 300 unit/mL (1.5 mL) InPn pen Inject into the skin once daily.      levothyroxine (SYNTHROID) 88 MCG tablet Take 88 mcg by mouth before breakfast.      metoprolol succinate (TOPROL-XL) 25 MG 24 hr tablet Take 50 mg by mouth 2 (two) times daily.      niacin 500 MG CpSR Take 250 mg by mouth every evening.      omega-3 acid ethyl esters (LOVAZA) 1 gram capsule Take 2 g by mouth 2 (two) times daily.      pantoprazole (PROTONIX) 20 MG tablet Take 20 mg by mouth once daily.      rosuvastatin (CRESTOR) 10 MG tablet Take 10 mg by mouth once daily.      sertraline (ZOLOFT) 100 MG tablet Take 100 mg by mouth once daily.      vitamin E 400 UNIT capsule Take 400 Units by mouth once daily.      metFORMIN (GLUCOPHAGE) 500 MG tablet Take 500 mg by mouth 2 (two) times daily with  meals.       No current facility-administered medications for this visit.     Patient Active Problem List    Diagnosis Date Noted    Cervical spondylosis with radiculopathy 06/21/2022     Past Medical History:   Diagnosis Date    Anemia     Anxiety     Cervical pain     Depression     Diabetes     GERD (gastroesophageal reflux disease)     Heart palpitations     Hyperlipidemia     Postoperative hypothyroidism     Unspecified osteoarthritis, unspecified site      Past Surgical History:   Procedure Laterality Date    ABDOMINAL SURGERY      AUGMENTATION OF BREAST      BLADDER SUSPENSION      CARPAL TUNNEL RELEASE Bilateral     HIP SURGERY Right     HYSTERECTOMY      vaginal; partial    THYROIDECTOMY      due to goiter, Hoshimoto's disease    TONSILLECTOMY      TRIGGER FINGER RELEASE      left 3rd finger     Social History     Tobacco Use    Smoking status: Former     Packs/day: 0.50     Years: 30.00     Pack years: 15.00     Types: Cigarettes    Smokeless tobacco: Never   Substance Use Topics    Alcohol use: Not Currently     Family History   Problem Relation Age of Onset    Diabetes Mellitus Mother     Heart disease Mother     Hyperlipidemia Mother     Cancer Mother     Diabetes Mellitus Father     Heart disease Father     Hypertension Father        Vital Signs  Pulse: 68  Resp: 16  BP: 123/71  BP Location: Other (Comment)  Patient Position: Sitting  Pain Score:   5  Height and Weight  Height: 5' (152.4 cm)  Weight: 71.7 kg (158 lb)  BSA (Calculated - sq m): 1.74 sq meters  BMI (Calculated): 30.9  Weight in (lb) to have BMI = 25: 127.7]    ROS:  Review of Systems   Constitutional:  Negative for chills and fever.   HENT:  Negative for nosebleeds and sore throat.    Eyes:  Negative for pain and visual disturbance.   Respiratory:  Negative for cough, chest tightness and shortness of breath.    Cardiovascular:  Negative for chest pain.   Gastrointestinal:  Negative for diarrhea, nausea and vomiting.   Genitourinary:   Negative for difficulty urinating, dysuria and hematuria.   Musculoskeletal:  Positive for neck pain. Negative for gait problem and myalgias.   Skin:  Negative for rash.   Neurological:  Negative for dizziness, facial asymmetry and headaches.   Psychiatric/Behavioral:  Negative for confusion and sleep disturbance. The patient is not nervous/anxious.      Vitals within last 24hrs:  Vitals:    12/12/22 1436   BP: 123/71   Pulse: 68   Resp: 16       Physical Exam:  General:  Pleasant. Well-nourished. Well-groomed.    Cardiac:  RRR, no carotid bruit     Lungs:  Breathing is quiet, non-labored      MMusculoskeletal:  Cervical spine palpation:  Moderately limited with extension.  Pain is increased with left rotation.  Tinel's is negative at bilateral wrist and elbows.  Spurling's maneuver is negative bilaterally.     Neurological:    Oriented to Person, Place, Time   Muscle strength against resistance:  Strength   Deltoids Triceps Biceps Wrist Extension Wrist Flexion Hand    Upper: R 5/5 5/5 5/5 5/5 5/5 5/5     L 5/5 5/5 5/5 5/5 5/5 5/5   Sensation is intact to primary modalities in bilateral upper extremities   Reflexes:    Left Right   Triceps 2+ 2+   Biceps 1+ 2+   Brachioradialis 1+ 1+   Parra's sign is negative bilaterally.  Gait: normal  Coordination is normal      Imaging:  MRI of the cervical spine was obtained on 03/08/2022.  At C5-6, there is central disc herniation which causes moderate central stenosis.  There is no foraminal stenosis.  At C6-7, there is central disc herniation that causes moderately severe central stenosis, but no foraminal stenosis.  At C7-T1, there is a small central disc herniation without central or foraminal stenosis.  Bone density obtained on 7/6/2022 revealed osteopenia.  The numbers have improved compared to prior study.  Cervical CT was obtained on 6/26/2022.  The study reveals there is calcified disk at C5-6 and C6-7.    Cervical x-rays with flexion and extension views were  performed on 07/06/2022.  This study shows normal alignment.  There is mild disc space narrowing and spondylosis at C4-5, C5-6, and C6-7.  There is no abnormal motion with flexion or extension.    Assessment/Plan:  Problem List Items Addressed This Visit          Neuro    Cervical spondylosis with radiculopathy - Primary    Relevant Orders    CBC Auto Differential    Comprehensive Metabolic Panel    X-Ray Chest PA And Lateral    EKG 12-lead    Vitamin D     Other Visit Diagnoses       Vitamin D deficiency        Relevant Orders    Vitamin D    Anemia, unspecified type        Relevant Orders    CBC Auto Differential    Type 2 diabetes mellitus without complication, without long-term current use of insulin        Relevant Medications    insulin glargine, TOUJEO, (TOUJEO SOLOSTAR U-300 INSULIN) 300 unit/mL (1.5 mL) InPn pen    Other Relevant Orders    Comprehensive Metabolic Panel    Gastroesophageal reflux disease, unspecified whether esophagitis present        Relevant Orders    X-Ray Chest PA And Lateral    EKG 12-lead            The nature of the procedure, as well as its attendant risks, were discussed in detail with the patient.  All questions were answered.  They are agreement with proceeding with surgery as planned, and are tentatively scheduled for C5-6, C6-7 TDR on 12/27/2022.        SEEMA Loya

## 2022-12-21 ENCOUNTER — HOSPITAL ENCOUNTER (OUTPATIENT)
Dept: RADIOLOGY | Facility: HOSPITAL | Age: 67
Discharge: HOME | End: 2022-12-21
Attending: NURSE PRACTITIONER
Payer: MEDICARE

## 2022-12-21 ENCOUNTER — TELEPHONE (OUTPATIENT)
Dept: NEUROSURGERY | Facility: CLINIC | Age: 67
End: 2022-12-21
Payer: MEDICARE

## 2022-12-21 ENCOUNTER — ANESTHESIA EVENT (OUTPATIENT)
Dept: SURGERY | Facility: HOSPITAL | Age: 67
End: 2022-12-21
Payer: MEDICARE

## 2022-12-21 DIAGNOSIS — K21.9 GASTROESOPHAGEAL REFLUX DISEASE, UNSPECIFIED WHETHER ESOPHAGITIS PRESENT: ICD-10-CM

## 2022-12-21 DIAGNOSIS — M47.22 CERVICAL SPONDYLOSIS WITH RADICULOPATHY: Primary | ICD-10-CM

## 2022-12-21 DIAGNOSIS — M47.22 CERVICAL SPONDYLOSIS WITH RADICULOPATHY: ICD-10-CM

## 2022-12-21 PROCEDURE — 71046 X-RAY EXAM CHEST 2 VIEWS: CPT | Mod: TC

## 2022-12-21 RX ORDER — MUPIROCIN 20 MG/G
1 OINTMENT TOPICAL 2 TIMES DAILY
Status: CANCELLED | OUTPATIENT
Start: 2022-12-21 | End: 2022-12-22

## 2022-12-24 ENCOUNTER — PATIENT MESSAGE (OUTPATIENT)
Dept: ADMINISTRATIVE | Facility: OTHER | Age: 67
End: 2022-12-24
Payer: MEDICARE

## 2022-12-25 ENCOUNTER — PATIENT MESSAGE (OUTPATIENT)
Dept: ADMINISTRATIVE | Facility: OTHER | Age: 67
End: 2022-12-25
Payer: MEDICARE

## 2022-12-26 ENCOUNTER — PATIENT MESSAGE (OUTPATIENT)
Dept: ADMINISTRATIVE | Facility: OTHER | Age: 67
End: 2022-12-26
Payer: MEDICARE

## 2022-12-27 ENCOUNTER — ANESTHESIA (OUTPATIENT)
Dept: SURGERY | Facility: HOSPITAL | Age: 67
End: 2022-12-27
Payer: MEDICARE

## 2022-12-27 ENCOUNTER — HOSPITAL ENCOUNTER (OUTPATIENT)
Facility: HOSPITAL | Age: 67
Discharge: HOME | End: 2022-12-29
Attending: NEUROLOGICAL SURGERY | Admitting: NEUROLOGICAL SURGERY
Payer: MEDICARE

## 2022-12-27 DIAGNOSIS — M47.22 CERVICAL SPONDYLOSIS WITH RADICULOPATHY: ICD-10-CM

## 2022-12-27 LAB
POCT GLUCOSE: 100 MG/DL (ref 70–110)
POCT GLUCOSE: 248 MG/DL (ref 70–110)

## 2022-12-27 PROCEDURE — 25000003 PHARM REV CODE 250: Performed by: NEUROLOGICAL SURGERY

## 2022-12-27 PROCEDURE — 37000009 HC ANESTHESIA EA ADD 15 MINS: Performed by: NEUROLOGICAL SURGERY

## 2022-12-27 PROCEDURE — 37000008 HC ANESTHESIA 1ST 15 MINUTES: Performed by: NEUROLOGICAL SURGERY

## 2022-12-27 PROCEDURE — 97162 PT EVAL MOD COMPLEX 30 MIN: CPT

## 2022-12-27 PROCEDURE — 63600175 PHARM REV CODE 636 W HCPCS: Performed by: NURSE PRACTITIONER

## 2022-12-27 PROCEDURE — 22856 TOT DISC ARTHRP 1NTRSPC CRV: CPT | Mod: ,,, | Performed by: NEUROLOGICAL SURGERY

## 2022-12-27 PROCEDURE — 22858 TOT DISC ARTHRP 2ND LVL CRV: CPT | Mod: AS,,, | Performed by: NURSE PRACTITIONER

## 2022-12-27 PROCEDURE — 22856 TOT DISC ARTHRP 1NTRSPC CRV: CPT | Mod: AS,,, | Performed by: NURSE PRACTITIONER

## 2022-12-27 PROCEDURE — 25000242 PHARM REV CODE 250 ALT 637 W/ HCPCS: Performed by: ANESTHESIOLOGY

## 2022-12-27 PROCEDURE — 22858 TOT DISC ARTHRP 2ND LVL CRV: CPT | Mod: ,,, | Performed by: NEUROLOGICAL SURGERY

## 2022-12-27 PROCEDURE — 25000003 PHARM REV CODE 250: Performed by: NURSE PRACTITIONER

## 2022-12-27 PROCEDURE — 63600175 PHARM REV CODE 636 W HCPCS: Performed by: NEUROLOGICAL SURGERY

## 2022-12-27 PROCEDURE — 71000015 HC POSTOP RECOV 1ST HR: Performed by: NEUROLOGICAL SURGERY

## 2022-12-27 PROCEDURE — 36000710: Performed by: NEUROLOGICAL SURGERY

## 2022-12-27 PROCEDURE — G0378 HOSPITAL OBSERVATION PER HR: HCPCS

## 2022-12-27 PROCEDURE — C1713 ANCHOR/SCREW BN/BN,TIS/BN: HCPCS | Performed by: NEUROLOGICAL SURGERY

## 2022-12-27 PROCEDURE — 63600175 PHARM REV CODE 636 W HCPCS: Performed by: ANESTHESIOLOGY

## 2022-12-27 PROCEDURE — 22858 PR TOT DISC ARTHROPLSTY INCL DISCECTOMY W/END PLATE PREP SECOND LVL: ICD-10-PCS | Mod: AS,,, | Performed by: NURSE PRACTITIONER

## 2022-12-27 PROCEDURE — 27201423 OPTIME MED/SURG SUP & DEVICES STERILE SUPPLY: Performed by: NEUROLOGICAL SURGERY

## 2022-12-27 PROCEDURE — 36000711: Performed by: NEUROLOGICAL SURGERY

## 2022-12-27 PROCEDURE — 22858 PR TOT DISC ARTHROPLSTY INCL DISCECTOMY W/END PLATE PREP SECOND LVL: ICD-10-PCS | Mod: ,,, | Performed by: NEUROLOGICAL SURGERY

## 2022-12-27 PROCEDURE — C1729 CATH, DRAINAGE: HCPCS | Performed by: NEUROLOGICAL SURGERY

## 2022-12-27 PROCEDURE — 96372 THER/PROPH/DIAG INJ SC/IM: CPT | Performed by: NURSE PRACTITIONER

## 2022-12-27 PROCEDURE — 25000003 PHARM REV CODE 250

## 2022-12-27 PROCEDURE — 63600175 PHARM REV CODE 636 W HCPCS

## 2022-12-27 PROCEDURE — 22856 PR TOTAL DISC ARTHROPLASTY, CERVICAL, SINGLE: ICD-10-PCS | Mod: ,,, | Performed by: NEUROLOGICAL SURGERY

## 2022-12-27 PROCEDURE — 22856 PR TOTAL DISC ARTHROPLASTY, CERVICAL, SINGLE: ICD-10-PCS | Mod: AS,,, | Performed by: NURSE PRACTITIONER

## 2022-12-27 PROCEDURE — 71000039 HC RECOVERY, EACH ADD'L HOUR: Performed by: NEUROLOGICAL SURGERY

## 2022-12-27 PROCEDURE — 71000033 HC RECOVERY, INTIAL HOUR: Performed by: NEUROLOGICAL SURGERY

## 2022-12-27 PROCEDURE — 25000003 PHARM REV CODE 250: Performed by: NURSE ANESTHETIST, CERTIFIED REGISTERED

## 2022-12-27 DEVICE — IMPLANTABLE DEVICE: Type: IMPLANTABLE DEVICE | Site: SPINE CERVICAL | Status: FUNCTIONAL

## 2022-12-27 RX ORDER — GLIMEPIRIDE 1 MG/1
2 TABLET ORAL 2 TIMES DAILY
Status: DISCONTINUED | OUTPATIENT
Start: 2022-12-28 | End: 2022-12-29 | Stop reason: HOSPADM

## 2022-12-27 RX ORDER — DILTIAZEM HYDROCHLORIDE 180 MG/1
180 CAPSULE, COATED, EXTENDED RELEASE ORAL 2 TIMES DAILY
Status: DISCONTINUED | OUTPATIENT
Start: 2022-12-27 | End: 2022-12-27

## 2022-12-27 RX ORDER — MEPERIDINE HYDROCHLORIDE 25 MG/ML
12.5 INJECTION INTRAMUSCULAR; INTRAVENOUS; SUBCUTANEOUS ONCE
Status: ACTIVE | OUTPATIENT
Start: 2022-12-27 | End: 2022-12-28

## 2022-12-27 RX ORDER — LIDOCAINE HYDROCHLORIDE 20 MG/ML
INJECTION, SOLUTION EPIDURAL; INFILTRATION; INTRACAUDAL; PERINEURAL
Status: DISCONTINUED | OUTPATIENT
Start: 2022-12-27 | End: 2022-12-27

## 2022-12-27 RX ORDER — SODIUM CHLORIDE, SODIUM LACTATE, POTASSIUM CHLORIDE, CALCIUM CHLORIDE 600; 310; 30; 20 MG/100ML; MG/100ML; MG/100ML; MG/100ML
INJECTION, SOLUTION INTRAVENOUS CONTINUOUS
Status: DISCONTINUED | OUTPATIENT
Start: 2022-12-27 | End: 2022-12-27

## 2022-12-27 RX ORDER — IBUPROFEN 200 MG
16 TABLET ORAL
Status: DISCONTINUED | OUTPATIENT
Start: 2022-12-27 | End: 2022-12-29 | Stop reason: HOSPADM

## 2022-12-27 RX ORDER — MORPHINE SULFATE 10 MG/ML
2 INJECTION INTRAMUSCULAR; INTRAVENOUS; SUBCUTANEOUS
Status: DISCONTINUED | OUTPATIENT
Start: 2022-12-27 | End: 2022-12-29 | Stop reason: HOSPADM

## 2022-12-27 RX ORDER — HYDROCODONE BITARTRATE AND ACETAMINOPHEN 10; 325 MG/1; MG/1
1 TABLET ORAL EVERY 4 HOURS PRN
Status: DISCONTINUED | OUTPATIENT
Start: 2022-12-27 | End: 2022-12-28

## 2022-12-27 RX ORDER — HYDROMORPHONE HYDROCHLORIDE 2 MG/ML
0.4 INJECTION, SOLUTION INTRAMUSCULAR; INTRAVENOUS; SUBCUTANEOUS EVERY 5 MIN PRN
Status: DISCONTINUED | OUTPATIENT
Start: 2022-12-27 | End: 2022-12-29 | Stop reason: HOSPADM

## 2022-12-27 RX ORDER — ACETAMINOPHEN 10 MG/ML
INJECTION, SOLUTION INTRAVENOUS
Status: COMPLETED
Start: 2022-12-27 | End: 2022-12-27

## 2022-12-27 RX ORDER — EPHEDRINE SULFATE 50 MG/ML
INJECTION, SOLUTION INTRAVENOUS
Status: DISCONTINUED | OUTPATIENT
Start: 2022-12-27 | End: 2022-12-27

## 2022-12-27 RX ORDER — ONDANSETRON 2 MG/ML
4 INJECTION INTRAMUSCULAR; INTRAVENOUS ONCE
Status: COMPLETED | OUTPATIENT
Start: 2022-12-27 | End: 2022-12-27

## 2022-12-27 RX ORDER — HYDROCODONE BITARTRATE AND ACETAMINOPHEN 5; 325 MG/1; MG/1
1 TABLET ORAL EVERY 4 HOURS PRN
Status: DISCONTINUED | OUTPATIENT
Start: 2022-12-27 | End: 2022-12-28

## 2022-12-27 RX ORDER — SODIUM CHLORIDE, SODIUM GLUCONATE, SODIUM ACETATE, POTASSIUM CHLORIDE AND MAGNESIUM CHLORIDE 30; 37; 368; 526; 502 MG/100ML; MG/100ML; MG/100ML; MG/100ML; MG/100ML
INJECTION, SOLUTION INTRAVENOUS CONTINUOUS
Status: DISCONTINUED | OUTPATIENT
Start: 2022-12-27 | End: 2022-12-27

## 2022-12-27 RX ORDER — DEXAMETHASONE SODIUM PHOSPHATE 4 MG/ML
INJECTION, SOLUTION INTRA-ARTICULAR; INTRALESIONAL; INTRAMUSCULAR; INTRAVENOUS; SOFT TISSUE
Status: DISCONTINUED | OUTPATIENT
Start: 2022-12-27 | End: 2022-12-27

## 2022-12-27 RX ORDER — PROPOFOL 10 MG/ML
VIAL (ML) INTRAVENOUS
Status: DISCONTINUED | OUTPATIENT
Start: 2022-12-27 | End: 2022-12-27

## 2022-12-27 RX ORDER — SODIUM CHLORIDE 9 MG/ML
INJECTION, SOLUTION INTRAVENOUS CONTINUOUS
Status: DISCONTINUED | OUTPATIENT
Start: 2022-12-27 | End: 2022-12-28

## 2022-12-27 RX ORDER — ONDANSETRON HYDROCHLORIDE 2 MG/ML
INJECTION, SOLUTION INTRAMUSCULAR; INTRAVENOUS
Status: DISCONTINUED | OUTPATIENT
Start: 2022-12-27 | End: 2022-12-27

## 2022-12-27 RX ORDER — INSULIN ASPART 100 [IU]/ML
0-5 INJECTION, SOLUTION INTRAVENOUS; SUBCUTANEOUS
Status: DISCONTINUED | OUTPATIENT
Start: 2022-12-27 | End: 2022-12-29 | Stop reason: HOSPADM

## 2022-12-27 RX ORDER — BUPIVACAINE HCL/EPINEPHRINE 0.5-1:200K
VIAL (ML) INJECTION
Status: DISCONTINUED | OUTPATIENT
Start: 2022-12-27 | End: 2022-12-27 | Stop reason: HOSPADM

## 2022-12-27 RX ORDER — GLUCAGON 1 MG
1 KIT INJECTION
Status: DISCONTINUED | OUTPATIENT
Start: 2022-12-27 | End: 2022-12-29 | Stop reason: HOSPADM

## 2022-12-27 RX ORDER — MIDAZOLAM HYDROCHLORIDE 1 MG/ML
1 INJECTION INTRAMUSCULAR; INTRAVENOUS ONCE
Status: COMPLETED | OUTPATIENT
Start: 2022-12-27 | End: 2022-12-27

## 2022-12-27 RX ORDER — SODIUM CHLORIDE 9 MG/ML
INJECTION, SOLUTION INTRAVENOUS CONTINUOUS
Status: DISCONTINUED | OUTPATIENT
Start: 2022-12-27 | End: 2022-12-27

## 2022-12-27 RX ORDER — METHOCARBAMOL 500 MG/1
1000 TABLET, FILM COATED ORAL EVERY 8 HOURS PRN
Status: DISCONTINUED | OUTPATIENT
Start: 2022-12-27 | End: 2022-12-28

## 2022-12-27 RX ORDER — ONDANSETRON 2 MG/ML
4 INJECTION INTRAMUSCULAR; INTRAVENOUS EVERY 6 HOURS PRN
Status: DISCONTINUED | OUTPATIENT
Start: 2022-12-27 | End: 2022-12-29 | Stop reason: HOSPADM

## 2022-12-27 RX ORDER — CEFAZOLIN SODIUM 2 G/50ML
2 SOLUTION INTRAVENOUS
Status: COMPLETED | OUTPATIENT
Start: 2022-12-27 | End: 2022-12-27

## 2022-12-27 RX ORDER — MUPIROCIN 20 MG/G
1 OINTMENT TOPICAL 2 TIMES DAILY
Status: ACTIVE | OUTPATIENT
Start: 2022-12-27 | End: 2022-12-28

## 2022-12-27 RX ORDER — ALBUTEROL SULFATE 0.83 MG/ML
2.5 SOLUTION RESPIRATORY (INHALATION) ONCE
Status: COMPLETED | OUTPATIENT
Start: 2022-12-27 | End: 2022-12-27

## 2022-12-27 RX ORDER — METHOCARBAMOL 100 MG/ML
500 INJECTION, SOLUTION INTRAMUSCULAR; INTRAVENOUS ONCE
Status: COMPLETED | OUTPATIENT
Start: 2022-12-27 | End: 2022-12-27

## 2022-12-27 RX ORDER — BISACODYL 10 MG
10 SUPPOSITORY, RECTAL RECTAL DAILY PRN
Status: DISCONTINUED | OUTPATIENT
Start: 2022-12-27 | End: 2022-12-29 | Stop reason: HOSPADM

## 2022-12-27 RX ORDER — MUPIROCIN 20 MG/G
OINTMENT TOPICAL 2 TIMES DAILY
Status: DISCONTINUED | OUTPATIENT
Start: 2022-12-27 | End: 2022-12-29 | Stop reason: HOSPADM

## 2022-12-27 RX ORDER — MIDAZOLAM HYDROCHLORIDE 1 MG/ML
2 INJECTION INTRAMUSCULAR; INTRAVENOUS ONCE AS NEEDED
Status: COMPLETED | OUTPATIENT
Start: 2022-12-27 | End: 2022-12-27

## 2022-12-27 RX ORDER — METFORMIN HYDROCHLORIDE 500 MG/1
500 TABLET ORAL 2 TIMES DAILY WITH MEALS
Status: DISCONTINUED | OUTPATIENT
Start: 2022-12-27 | End: 2022-12-27

## 2022-12-27 RX ORDER — METHOCARBAMOL 100 MG/ML
INJECTION, SOLUTION INTRAMUSCULAR; INTRAVENOUS
Status: DISPENSED
Start: 2022-12-27 | End: 2022-12-27

## 2022-12-27 RX ORDER — MIDAZOLAM HYDROCHLORIDE 1 MG/ML
INJECTION INTRAMUSCULAR; INTRAVENOUS
Status: DISPENSED
Start: 2022-12-27 | End: 2022-12-27

## 2022-12-27 RX ORDER — ALBUTEROL SULFATE 0.83 MG/ML
SOLUTION RESPIRATORY (INHALATION)
Status: DISPENSED
Start: 2022-12-27 | End: 2022-12-27

## 2022-12-27 RX ORDER — IBUPROFEN 200 MG
24 TABLET ORAL
Status: DISCONTINUED | OUTPATIENT
Start: 2022-12-27 | End: 2022-12-29 | Stop reason: HOSPADM

## 2022-12-27 RX ORDER — ACETAMINOPHEN 10 MG/ML
1000 INJECTION, SOLUTION INTRAVENOUS ONCE
Status: COMPLETED | OUTPATIENT
Start: 2022-12-27 | End: 2022-12-27

## 2022-12-27 RX ORDER — PROCHLORPERAZINE EDISYLATE 5 MG/ML
5 INJECTION INTRAMUSCULAR; INTRAVENOUS EVERY 6 HOURS PRN
Status: DISCONTINUED | OUTPATIENT
Start: 2022-12-27 | End: 2022-12-29 | Stop reason: HOSPADM

## 2022-12-27 RX ORDER — AMOXICILLIN 250 MG
2 CAPSULE ORAL NIGHTLY PRN
Status: DISCONTINUED | OUTPATIENT
Start: 2022-12-27 | End: 2022-12-29 | Stop reason: HOSPADM

## 2022-12-27 RX ORDER — MAG HYDROX/ALUMINUM HYD/SIMETH 200-200-20
30 SUSPENSION, ORAL (FINAL DOSE FORM) ORAL EVERY 4 HOURS PRN
Status: DISCONTINUED | OUTPATIENT
Start: 2022-12-27 | End: 2022-12-29 | Stop reason: HOSPADM

## 2022-12-27 RX ORDER — ROCURONIUM BROMIDE 10 MG/ML
INJECTION, SOLUTION INTRAVENOUS
Status: DISCONTINUED | OUTPATIENT
Start: 2022-12-27 | End: 2022-12-27

## 2022-12-27 RX ORDER — FENTANYL CITRATE 50 UG/ML
INJECTION, SOLUTION INTRAMUSCULAR; INTRAVENOUS
Status: DISCONTINUED | OUTPATIENT
Start: 2022-12-27 | End: 2022-12-27

## 2022-12-27 RX ORDER — DILTIAZEM HYDROCHLORIDE 120 MG/1
120 CAPSULE, COATED, EXTENDED RELEASE ORAL DAILY
Status: DISCONTINUED | OUTPATIENT
Start: 2022-12-27 | End: 2022-12-29 | Stop reason: HOSPADM

## 2022-12-27 RX ADMIN — HYDROMORPHONE HYDROCHLORIDE 0.4 MG: 2 INJECTION INTRAMUSCULAR; INTRAVENOUS; SUBCUTANEOUS at 10:12

## 2022-12-27 RX ADMIN — ACETAMINOPHEN 1000 MG: 10 INJECTION, SOLUTION INTRAVENOUS at 11:12

## 2022-12-27 RX ADMIN — MIDAZOLAM HYDROCHLORIDE 1 MG: 1 INJECTION, SOLUTION INTRAMUSCULAR; INTRAVENOUS at 11:12

## 2022-12-27 RX ADMIN — METHOCARBAMOL 1000 MG: 500 TABLET ORAL at 11:12

## 2022-12-27 RX ADMIN — EPHEDRINE SULFATE 10 MG: 50 INJECTION INTRAVENOUS at 07:12

## 2022-12-27 RX ADMIN — PHENYLEPHRINE HYDROCHLORIDE 30 MCG/MIN: 10 INJECTION INTRAVENOUS at 08:12

## 2022-12-27 RX ADMIN — ONDANSETRON 4 MG: 2 INJECTION INTRAMUSCULAR; INTRAVENOUS at 09:12

## 2022-12-27 RX ADMIN — ALBUTEROL SULFATE 2.5 MG: 2.5 SOLUTION RESPIRATORY (INHALATION) at 10:12

## 2022-12-27 RX ADMIN — METHOCARBAMOL 500 MG: 100 INJECTION, SOLUTION INTRAMUSCULAR; INTRAVENOUS at 10:12

## 2022-12-27 RX ADMIN — METHOCARBAMOL 1000 MG: 500 TABLET ORAL at 07:12

## 2022-12-27 RX ADMIN — FENTANYL CITRATE 75 MCG: 50 INJECTION, SOLUTION INTRAMUSCULAR; INTRAVENOUS at 07:12

## 2022-12-27 RX ADMIN — EPHEDRINE SULFATE 5 MG: 50 INJECTION INTRAVENOUS at 08:12

## 2022-12-27 RX ADMIN — FENTANYL CITRATE 25 MCG: 50 INJECTION, SOLUTION INTRAMUSCULAR; INTRAVENOUS at 08:12

## 2022-12-27 RX ADMIN — MIDAZOLAM HYDROCHLORIDE 2 MG: 1 INJECTION, SOLUTION INTRAMUSCULAR; INTRAVENOUS at 07:12

## 2022-12-27 RX ADMIN — DEXTROSE MONOHYDRATE 1 G: 2.5 INJECTION INTRAVENOUS at 07:12

## 2022-12-27 RX ADMIN — INSULIN ASPART 4 UNITS: 100 INJECTION, SOLUTION INTRAVENOUS; SUBCUTANEOUS at 11:12

## 2022-12-27 RX ADMIN — INSULIN ASPART 2 UNITS: 100 INJECTION, SOLUTION INTRAVENOUS; SUBCUTANEOUS at 04:12

## 2022-12-27 RX ADMIN — DEXAMETHASONE SODIUM PHOSPHATE 4 MG: 4 INJECTION, SOLUTION INTRA-ARTICULAR; INTRALESIONAL; INTRAMUSCULAR; INTRAVENOUS; SOFT TISSUE at 07:12

## 2022-12-27 RX ADMIN — EPHEDRINE SULFATE 10 MG: 50 INJECTION INTRAVENOUS at 08:12

## 2022-12-27 RX ADMIN — HYDROCODONE BITARTRATE AND ACETAMINOPHEN 1 TABLET: 10; 325 TABLET ORAL at 11:12

## 2022-12-27 RX ADMIN — FENTANYL CITRATE 50 MCG: 50 INJECTION, SOLUTION INTRAMUSCULAR; INTRAVENOUS at 09:12

## 2022-12-27 RX ADMIN — ONDANSETRON 4 MG: 2 INJECTION INTRAMUSCULAR; INTRAVENOUS at 11:12

## 2022-12-27 RX ADMIN — ROCURONIUM BROMIDE 20 MG: 10 INJECTION, SOLUTION INTRAVENOUS at 09:12

## 2022-12-27 RX ADMIN — SODIUM CHLORIDE, SODIUM GLUCONATE, SODIUM ACETATE, POTASSIUM CHLORIDE AND MAGNESIUM CHLORIDE: 526; 502; 368; 37; 30 INJECTION, SOLUTION INTRAVENOUS at 07:12

## 2022-12-27 RX ADMIN — SUGAMMADEX 200 MG: 100 INJECTION, SOLUTION INTRAVENOUS at 10:12

## 2022-12-27 RX ADMIN — LIDOCAINE HYDROCHLORIDE 30 MG: 20 INJECTION, SOLUTION EPIDURAL; INFILTRATION; INTRACAUDAL; PERINEURAL at 07:12

## 2022-12-27 RX ADMIN — SENNOSIDES AND DOCUSATE SODIUM 2 TABLET: 50; 8.6 TABLET ORAL at 11:12

## 2022-12-27 RX ADMIN — MUPIROCIN: 20 OINTMENT TOPICAL at 09:12

## 2022-12-27 RX ADMIN — CEFAZOLIN SODIUM 2 G: 2 SOLUTION INTRAVENOUS at 08:12

## 2022-12-27 RX ADMIN — PROPOFOL 150 MG: 10 INJECTION, EMULSION INTRAVENOUS at 07:12

## 2022-12-27 RX ADMIN — ROCURONIUM BROMIDE 50 MG: 10 INJECTION, SOLUTION INTRAVENOUS at 07:12

## 2022-12-27 RX ADMIN — HYDROCODONE BITARTRATE AND ACETAMINOPHEN 1 TABLET: 10; 325 TABLET ORAL at 04:12

## 2022-12-27 RX ADMIN — FENTANYL CITRATE 50 MCG: 50 INJECTION, SOLUTION INTRAMUSCULAR; INTRAVENOUS at 08:12

## 2022-12-27 NOTE — PLAN OF CARE
Problem: Physical Therapy  Goal: Physical Therapy Goal  Description: Goals to be met by: 2023     Patient will increase functional independence with mobility by performin. Gait  x 300 feet with [place] using No Assistive Device.   2. Ascend/descend 3 stair with no Handrails Supervision using No Assistive Device.       Outcome: Ongoing, Progressing

## 2022-12-27 NOTE — ANESTHESIA PREPROCEDURE EVALUATION
12/27/2022  Lachelle Alanis is a 67 y.o., female   Pre-operative evaluation for Procedure(s) (LRB):  REPLACEMENT, INTERVERTEBRAL DISC, CERVICAL, TOTAL (N/A)    /79   Pulse (!) 52   Temp 36.9 °C (98.4 °F) (Oral)   Resp 20   Ht 5' (1.524 m)   Wt 70.6 kg (155 lb 10.3 oz)   LMP  (LMP Unknown)   SpO2 96%   Breastfeeding No   BMI 30.40 kg/m²     Past Medical History:   Diagnosis Date    Anemia     Anxiety     Arm pain, right     Cervical pain     Depression     Diabetes     GERD (gastroesophageal reflux disease)     Headache     Heart palpitations     Hyperlipidemia     Irregular heart beat     Postoperative hypothyroidism     Shoulder pain     Sleep apnea     does not use CPAP    Unspecified osteoarthritis, unspecified site        Patient Active Problem List   Diagnosis    Cervical spondylosis with radiculopathy       Review of patient's allergies indicates:  No Known Allergies    Current Outpatient Medications   Medication Instructions    alendronate (FOSAMAX) 70 mg, Oral, Every 7 days    ascorbic acid (vitamin C) (VITAMIN C) 1,000 mg, Oral, 2 times daily    aspirin 162 mg, Oral, Daily    black cohosh 40 mg Tab 2 tablets, Oral, Daily    CARTIA  mg, Oral, Daily    cholecalciferol (vitamin D3) 5,000 Units, Oral, Daily    co-enzyme Q-10 200 mg, Oral, Daily    cyanocobalamin (VITAMIN B-12) 1,000 mcg, Oral, Every Mon, Wed, Fri    diltiaZEM (CARDIZEM CD) 180 mg, Oral, 2 times daily    fenofibrate (TRICOR) 54 mg, Oral, Daily    glimepiride (AMARYL) 4 mg, Oral, 2 times daily    glucosamine/chondroitin/C/Farzad (GLUCOSAMINE 1500 COMPLEX ORAL) 1 tablet, Oral, 2 times daily    levothyroxine (SYNTHROID) 88 mcg, Oral, Before breakfast    metFORMIN (GLUCOPHAGE) 500 mg, Oral, 2 times daily with meals    metoprolol succinate (TOPROL-XL) 37.5 mg, Oral, 2 times daily    niacin  1,000 mg, Oral, Nightly    omega-3 acid ethyl esters (LOVAZA) 2 g, Oral, 2 times daily    pantoprazole (PROTONIX) 20 mg, Oral, Daily    rosuvastatin (CRESTOR) 10 mg, Oral, Daily    sertraline (ZOLOFT) 100 mg, Oral, Daily    TOUJEO SOLOSTAR U-300 INSULIN 38 Units, Subcutaneous, Daily    vitamin E 400 Units, Oral, Daily       Past Surgical History:   Procedure Laterality Date    ABDOMINAL SURGERY      AUGMENTATION OF BREAST      BLADDER SUSPENSION      BUNIONECTOMY Right     CARPAL TUNNEL RELEASE Bilateral     COLONOSCOPY      DECOMPRESSION OF ULNAR NERVE      ESOPHAGOGASTRODUODENOSCOPY      HIP SURGERY Right     HYSTERECTOMY      vaginal; partial    THYROIDECTOMY      due to goiter, Hoshimoto's disease    TONSILLECTOMY      TRIGGER FINGER RELEASE      left 3rd finger       Social History     Socioeconomic History    Marital status:    Tobacco Use    Smoking status: Former     Packs/day: 0.50     Years: 30.00     Pack years: 15.00     Types: Cigarettes    Smokeless tobacco: Never   Substance and Sexual Activity    Alcohol use: Not Currently    Drug use: Not Currently       Lab Results   Component Value Date    WBC 4.4 (L) 12/21/2022    HGB 12.8 12/21/2022    HCT 38.3 12/21/2022    MCV 92.3 12/21/2022     (L) 12/21/2022          BMP  Lab Results   Component Value Date     12/21/2022    K 4.4 12/21/2022    CHLORIDE 105 12/21/2022    CO2 30 12/21/2022    GLUCOSE 321 (H) 12/21/2022    BUN 22.2 (H) 12/21/2022    CREATININE 1.12 (H) 12/21/2022    CALCIUM 9.6 12/21/2022        INR  No results for input(s): PT, INR, PROTIME, APTT in the last 72 hours.        Diagnostic Studies:      EKG:  Results for orders placed or performed in visit on 12/21/22   EKG 12-lead    Collection Time: 12/21/22  2:09 PM    Narrative    Test Reason : M47.22,K21.9,    Vent. Rate : 057 BPM     Atrial Rate : 057 BPM     P-R Int : 144 ms          QRS Dur : 072 ms      QT Int : 428 ms       P-R-T Axes : 024  023 030 degrees     QTc Int : 416 ms    Sinus bradycardia  Low voltage QRS  Borderline Abnormal ECG  No previous ECGs available  Confirmed by Oswaldo Linn MD ([id number]) on 12/21/2022 3:55:48 PM    Referred By:             Confirmed By:Oswaldo Linn MD       .      Pre-op Assessment    I have reviewed the Patient Summary Reports.    I have reviewed the NPO Status.   I have reviewed the Medications.     Review of Systems  Anesthesia Hx:  No problems with previous Anesthesia  Denies Family Hx of Anesthesia complications.    Cardiovascular:  Cardiovascular Normal  No Cardiac Complaints   Pulmonary:  Pulmonary Normal No Pulmonary Complaints   Hepatic/GI:   No Current GERD Sx       Physical Exam  General: Alert and Oriented    Airway:  Mallampati: II   Mouth Opening: Normal  TM Distance: Normal  Tongue: Normal  Neck ROM: Normal ROM    Dental:  Intact    Chest/Lungs:  Clear to auscultation, Normal Respiratory Rate    Heart:  Rate: Normal  Rhythm: Regular Rhythm        Anesthesia Plan  Type of Anesthesia, risks & benefits discussed:    Anesthesia Type: Gen ETT  Intra-op Monitoring Plan: Standard ASA Monitors  Post Op Pain Control Plan: multimodal analgesia  Induction:  IV and Inhalation  Airway Plan: Direct, Post-Induction  Informed Consent: Informed consent signed with the Patient and all parties understand the risks and agree with anesthesia plan.  All questions answered.   ASA Score: 3  Day of Surgery Review of History & Physical: H&P Update referred to the surgeon/provider.  Anesthesia Plan Notes: Discussed Anesthetic Plan w/ Pt/Family. Questions Entertained. Accepted.    Ready For Surgery From Anesthesia Perspective.     .

## 2022-12-27 NOTE — PT/OT/SLP EVAL
Physical Therapy Evaluation    Patient Name:  Lachelle Alanis   MRN:  [id number]    Recommendations:     Discharge Recommendations: home, home health PT   Discharge Equipment Recommendations:  (TBD)   Barriers to discharge:  acuity of illness    Assessment:     Lachelle Alanis is a 67 y.o. female admitted with a medical diagnosis of Cervical spondylosis with radiculopathy.  She presents with the following impairments/functional limitations: weakness, impaired endurance, impaired functional mobility, gait instability, pain, orthopedic precautions.    Rehab Prognosis: Good; patient would benefit from acute skilled PT services to address these deficits and reach maximum level of function.    Recent Surgery: Procedure(s) (LRB):  REPLACEMENT, INTERVERTEBRAL DISC, CERVICAL, TOTAL (N/A) Day of Surgery    Plan:     During this hospitalization, patient to be seen 6 x/week to address the identified rehab impairments via gait training, therapeutic activities, therapeutic exercises, neuromuscular re-education and progress toward the following goals:    Plan of Care Expires:  01/26/23    Subjective     Chief Complaint: pain and feeling weak  Patient/Family Comments/goals: to go home  Pain/Comfort:  Pain Rating 1: 3/10  Location 1: neck  Pain Addressed 1: Nurse notified    Patients cultural, spiritual, Jewish conflicts given the current situation: no    Living Environment:  Pt lives w/ family in a SL home, 3 steps to enter. No railing.   Prior to admission, patients level of function was independent  Equipment used at home: none.  DME owned (not currently used): none.  Upon discharge, patient will have assistance from family.    Objective:     Communicated with RN prior to session.  Patient found HOB elevated with peripheral IV  upon PT entry to room.    General Precautions: Standard,    Orthopedic Precautions:spinal precautions   Braces: Cervical collar  Respiratory Status: Nasal cannula, flow 2 L/min    Exams:  Cognitive  Exam:  Patient is oriented to Person, Place, Time, and Situation  RLE Strength: WNL  LLE Strength: WNL    Functional Mobility:  Bed Mobility:     Supine to Sit: modified independence  Sit to Supine: modified independence  Transfers:     Sit to Stand:  stand by assistance with no AD  Toilet Transfer: stand by assistance with  no AD  using  Stand Pivot  Gait: pt demo'd a slightly unsteady step through gt pattern w/ short step length bilat, no overt LOB, x 30 ft. Pt stated that she just felt a little unsteady from anesthesia      AM-PAC 6 CLICK MOBILITY  Total Score:19       Patient left HOB elevated with all lines intact, call button in reach, and RN notified.    GOALS:   Multidisciplinary Problems       Physical Therapy Goals          Problem: Physical Therapy    Goal Priority Disciplines Outcome Goal Variances Interventions   Physical Therapy Goal     PT, PT/OT Ongoing, Progressing     Description: Goals to be met by: 2023     Patient will increase functional independence with mobility by performin. Gait  x 300 feet with [place] using No Assistive Device.   2. Ascend/descend 3 stair with no Handrails Supervision using No Assistive Device.                            History:     Past Medical History:   Diagnosis Date    Anemia     Anxiety     Arm pain, right     Cervical pain     Depression     Diabetes     GERD (gastroesophageal reflux disease)     Headache     Heart palpitations     Hyperlipidemia     Irregular heart beat     Postoperative hypothyroidism     Shoulder pain     Sleep apnea     does not use CPAP    Unspecified osteoarthritis, unspecified site        Past Surgical History:   Procedure Laterality Date    ABDOMINAL SURGERY      AUGMENTATION OF BREAST      BLADDER SUSPENSION      BUNIONECTOMY Right     CARPAL TUNNEL RELEASE Bilateral     COLONOSCOPY      DECOMPRESSION OF ULNAR NERVE      ESOPHAGOGASTRODUODENOSCOPY      HIP SURGERY Right     HYSTERECTOMY      vaginal; partial     THYROIDECTOMY      due to goiter, Hoshimoto's disease    TONSILLECTOMY      TRIGGER FINGER RELEASE      left 3rd finger       Time Tracking:     PT Received On: 12/27/22  PT Start Time: 1423     PT Stop Time: 1434  PT Total Time (min): 11 min     Billable Minutes: Evaluation , moderate complexity      12/27/2022

## 2022-12-27 NOTE — ANESTHESIA POSTPROCEDURE EVALUATION
Anesthesia Post Evaluation    Patient: Lachelle Alanis    Procedure(s) Performed: Procedure(s) (LRB):  REPLACEMENT, INTERVERTEBRAL DISC, CERVICAL, TOTAL (N/A)    Final Anesthesia Type: general      Patient location during evaluation: PACU  Patient participation: Yes- Able to Participate  Level of consciousness: awake  Post-procedure vital signs: reviewed and stable  Pain management: adequate  Airway patency: patent    PONV status at discharge: vomiting (controlled) and nausea (controlled)  Anesthetic complications: no      Cardiovascular status: hemodynamically stable  Respiratory status: spontaneous ventilation and unassisted  Hydration status: euvolemic  Follow-up not needed.  Comments:              Vitals Value Taken Time   /73 12/27/22 1232   Temp 36.3 °C (97.3 °F) 12/27/22 1020   Pulse 82 12/27/22 1234   Resp 20 12/27/22 1234   SpO2 98 % 12/27/22 1234   Vitals shown include unvalidated device data.      No case tracking events are documented in the log.      Pain/Jerri Score: Pain Rating Prior to Med Admin: 7 (12/27/2022 11:51 AM)  Jerri Score: 10 (12/27/2022 12:00 PM)

## 2022-12-27 NOTE — ANESTHESIA PROCEDURE NOTES
Intubation    Date/Time: 12/27/2022 7:47 AM  Performed by: Brennan Menon  Authorized by: Jeff Go MD     Intubation:     Induction:  Intravenous    Intubated:  Postinduction    Mask Ventilation:  Easy mask    Attempts:  1    Attempted By:  Student    Method of Intubation:  Video laryngoscopy    Blade:  Sandra 3    Laryngeal View Grade: Grade I - full view of cords      Difficult Airway Encountered?: No      Complications:  None    Airway Device:  Oral endotracheal tube    Airway Device Size:  7.5    Style/Cuff Inflation:  Cuffed    Tube secured:  22    Secured at:  The lips    Placement Verified By:  Capnometry    Complicating Factors:  None    Findings Post-Intubation:  BS equal bilateral

## 2022-12-27 NOTE — TRANSFER OF CARE
Anesthesia Transfer of Care Note    Patient: Lachelle Alanis    Procedure(s) Performed: Procedure(s) (LRB):  REPLACEMENT, INTERVERTEBRAL DISC, CERVICAL, TOTAL (N/A)    Patient location: PACU    Anesthesia Type: general    Transport from OR: Transported from OR on room air with adequate spontaneous ventilation    Post pain: adequate analgesia    Post assessment: no apparent anesthetic complications and tolerated procedure well    Post vital signs: stable    Level of consciousness: sedated    Nausea/Vomiting: no nausea/vomiting    Complications: none    Transfer of care protocol was followed      Last vitals:   Visit Vitals  /67   Pulse 62   Temp 36.9 °C (98.4 °F) (Oral)   Resp 16   Ht 5' (1.524 m)   Wt 70.6 kg (155 lb 10.3 oz)   LMP  (LMP Unknown)   SpO2 98%   Breastfeeding No   BMI 30.40 kg/m²

## 2022-12-27 NOTE — BRIEF OP NOTE
[facility] - Periop Services  Brief Operative Note    SUMMARY     Surgery Date: 12/27/2022     Surgeon(s) and Role:     * Daniel Melissa MD - Primary     * FARIDA Waterman-BC - Assisting    Pre-op Diagnosis:  Cervical spondylosis with radiculopathy [M47.22]    Post-op Diagnosis:  Post-Op Diagnosis Codes:     * Cervical spondylosis with radiculopathy [M47.22]    Procedure(s) (LRB):  REPLACEMENT, INTERVERTEBRAL DISC, CERVICAL, TOTAL (N/A)  -C5-6 TDR (prodisc-c vivo; 5mm medium)  -C6-7 TDR (Prodisc-c vivo, 5mm medium)  -microscope, c-arm, NTI (SSEPs), AAZM x1      Anesthesia: General    Operative Findings: Patient tolerated procedure well and was transferred to PACU.     Estimated Blood Loss: <50ml    Estimated Blood Loss has been documented.         Specimens:   Specimen (24h ago, onward)      None            [id number]

## 2022-12-28 LAB
CBG: 213
POCT GLUCOSE: 173 MG/DL (ref 70–110)
POCT GLUCOSE: 200 MG/DL (ref 70–110)
POCT GLUCOSE: 213 MG/DL (ref 70–110)
POCT GLUCOSE: 250 MG/DL (ref 70–110)
POCT GLUCOSE: 303 MG/DL (ref 70–110)

## 2022-12-28 PROCEDURE — 99024 PR POST-OP FOLLOW-UP VISIT: ICD-10-PCS | Mod: POP,,, | Performed by: NURSE PRACTITIONER

## 2022-12-28 PROCEDURE — 99024 POSTOP FOLLOW-UP VISIT: CPT | Mod: POP,,, | Performed by: NEUROLOGICAL SURGERY

## 2022-12-28 PROCEDURE — 96372 THER/PROPH/DIAG INJ SC/IM: CPT | Performed by: NURSE PRACTITIONER

## 2022-12-28 PROCEDURE — 25000003 PHARM REV CODE 250: Performed by: NURSE PRACTITIONER

## 2022-12-28 PROCEDURE — 63600175 PHARM REV CODE 636 W HCPCS: Performed by: NURSE PRACTITIONER

## 2022-12-28 PROCEDURE — 99024 POSTOP FOLLOW-UP VISIT: CPT | Mod: POP,,, | Performed by: NURSE PRACTITIONER

## 2022-12-28 PROCEDURE — 97530 THERAPEUTIC ACTIVITIES: CPT

## 2022-12-28 PROCEDURE — G0378 HOSPITAL OBSERVATION PER HR: HCPCS

## 2022-12-28 PROCEDURE — 25000003 PHARM REV CODE 250: Performed by: NEUROLOGICAL SURGERY

## 2022-12-28 PROCEDURE — 99024 PR POST-OP FOLLOW-UP VISIT: ICD-10-PCS | Mod: POP,,, | Performed by: NEUROLOGICAL SURGERY

## 2022-12-28 RX ORDER — TIZANIDINE 4 MG/1
4 TABLET ORAL EVERY 8 HOURS PRN
Status: DISCONTINUED | OUTPATIENT
Start: 2022-12-28 | End: 2022-12-29 | Stop reason: HOSPADM

## 2022-12-28 RX ORDER — OXYCODONE AND ACETAMINOPHEN 10; 325 MG/1; MG/1
1 TABLET ORAL EVERY 4 HOURS PRN
Status: DISCONTINUED | OUTPATIENT
Start: 2022-12-28 | End: 2022-12-28

## 2022-12-28 RX ORDER — OXYCODONE AND ACETAMINOPHEN 5; 325 MG/1; MG/1
1 TABLET ORAL EVERY 4 HOURS PRN
Status: DISCONTINUED | OUTPATIENT
Start: 2022-12-28 | End: 2022-12-28

## 2022-12-28 RX ORDER — TRAMADOL HYDROCHLORIDE 50 MG/1
50 TABLET ORAL EVERY 4 HOURS PRN
Status: DISCONTINUED | OUTPATIENT
Start: 2022-12-28 | End: 2022-12-29 | Stop reason: HOSPADM

## 2022-12-28 RX ADMIN — MUPIROCIN: 20 OINTMENT TOPICAL at 09:12

## 2022-12-28 RX ADMIN — INSULIN ASPART 4 UNITS: 100 INJECTION, SOLUTION INTRAVENOUS; SUBCUTANEOUS at 06:12

## 2022-12-28 RX ADMIN — TRAMADOL HYDROCHLORIDE 50 MG: 50 TABLET, COATED ORAL at 05:12

## 2022-12-28 RX ADMIN — GLIMEPIRIDE 2 MG: 1 TABLET ORAL at 09:12

## 2022-12-28 RX ADMIN — DEXTROSE MONOHYDRATE 1 G: 2.5 INJECTION INTRAVENOUS at 03:12

## 2022-12-28 RX ADMIN — TIZANIDINE 4 MG: 4 TABLET ORAL at 08:12

## 2022-12-28 RX ADMIN — INSULIN ASPART 4 UNITS: 100 INJECTION, SOLUTION INTRAVENOUS; SUBCUTANEOUS at 08:12

## 2022-12-28 RX ADMIN — TRAMADOL HYDROCHLORIDE 50 MG: 50 TABLET, COATED ORAL at 08:12

## 2022-12-28 RX ADMIN — INSULIN ASPART 2 UNITS: 100 INJECTION, SOLUTION INTRAVENOUS; SUBCUTANEOUS at 05:12

## 2022-12-28 RX ADMIN — GLIMEPIRIDE 2 MG: 1 TABLET ORAL at 08:12

## 2022-12-28 RX ADMIN — OXYCODONE AND ACETAMINOPHEN 1 TABLET: 10; 325 TABLET ORAL at 09:12

## 2022-12-28 RX ADMIN — SENNOSIDES AND DOCUSATE SODIUM 2 TABLET: 50; 8.6 TABLET ORAL at 08:12

## 2022-12-28 RX ADMIN — DILTIAZEM HYDROCHLORIDE 120 MG: 120 CAPSULE, COATED, EXTENDED RELEASE ORAL at 09:12

## 2022-12-28 NOTE — PT/OT/SLP PROGRESS
Physical Therapy Treatment    Patient Name:  Lachelle Alanis   MRN:  [id number]    Recommendations:     Discharge Recommendations: home  Discharge Equipment Recommendations: none  Barriers to discharge: None    Assessment:     Lachelle Alanis is a 67 y.o. female admitted with a medical diagnosis of Cervical spondylosis with radiculopathy.  Pt has met all therapy goals and is independent to ambulate so skilled PT services no longer warranted. PT signing off.     Rehab Prognosis: Good  Recent Surgery: Procedure(s) (LRB):  REPLACEMENT, INTERVERTEBRAL DISC, CERVICAL, TOTAL (N/A) 1 Day Post-Op    Plan:     Dc from acute PT.    Plan of Care Expires:  12/28/22    Subjective     Chief Complaint: pain  Patient/Family Comments/goals: to go home  Pain/Comfort:  Pain Rating 1: 9/10  Location 1: neck  Pain Addressed 1: Nurse notified      Objective:     Communicated with RN prior to session.  Patient found HOB elevated with peripheral IV upon PT entry to room.     General Precautions: Standard,    Orthopedic Precautions: spinal precautions  Braces: Cervical collar  Respiratory Status: Room air     Functional Mobility:  Bed Mobility:     Supine to Sit: independence  Transfers:     Sit to Stand:  independence with no AD  Gait: pt demo'd a steady reciprocal gt pattern x 400 ft w/ no AD.  Stairs:  Pt ascended/descended 4 stair(s) with No Assistive Device with no handrails with Supervision or Set-up Assistance.       AM-PAC 6 CLICK MOBILITY  Turning over in bed (including adjusting bedclothes, sheets and blankets)?: 4  Sitting down on and standing up from a chair with arms (e.g., wheelchair, bedside commode, etc.): 4  Moving from lying on back to sitting on the side of the bed?: 4  Moving to and from a bed to a chair (including a wheelchair)?: 4  Need to walk in hospital room?: 4  Climbing 3-5 steps with a railing?: 4  Basic Mobility Total Score: 24         Patient left sitting edge of bed with all lines intact, call button in  reach, and RN notified..    GOALS:   Multidisciplinary Problems       Physical Therapy Goals       Not on file              Multidisciplinary Problems (Resolved)          Problem: Physical Therapy    Goal Priority Disciplines Outcome Goal Variances Interventions   Physical Therapy Goal   (Resolved)     PT, PT/OT Met     Description: Goals to be met by: 2023     Patient will increase functional independence with mobility by performin. Gait  x 300 feet with [place] using No Assistive Device.   2. Ascend/descend 3 stair with no Handrails Supervision using No Assistive Device.                            Time Tracking:     PT Received On: 22  PT Start Time: 912     PT Stop Time: 923  PT Total Time (min): 11 min     Billable Minutes: Therapeutic Activity 11 mins    Treatment Type: Treatment  PT/PTA: PT     PTA Visit Number: 0     2022

## 2022-12-28 NOTE — NURSING
Nurses Note -- 4 Eyes      12/28/2022   9:33 AM      Skin assessed during: Admit      [x] No Pressure Injuries Present    [x]Prevention Measures Documented      [] Yes- Altered Skin Integrity Present or Discovered   [] LDA Added if Not in Epic (Describe Wound)   [] New Altered Skin Integrity was Present on Admit and Documented in LDA   [] Wound Image Taken    Wound Care Consulted? No    Attending Nurse:  Rolf Ayala RN     Second RN/Staff Member:  Deborah Parks

## 2022-12-29 VITALS
HEART RATE: 73 BPM | RESPIRATION RATE: 18 BRPM | OXYGEN SATURATION: 93 % | DIASTOLIC BLOOD PRESSURE: 78 MMHG | BODY MASS INDEX: 30.55 KG/M2 | TEMPERATURE: 99 F | HEIGHT: 60 IN | WEIGHT: 155.63 LBS | SYSTOLIC BLOOD PRESSURE: 136 MMHG

## 2022-12-29 LAB — POCT GLUCOSE: 87 MG/DL (ref 70–110)

## 2022-12-29 PROCEDURE — G0378 HOSPITAL OBSERVATION PER HR: HCPCS

## 2022-12-29 PROCEDURE — 25000003 PHARM REV CODE 250: Performed by: NURSE PRACTITIONER

## 2022-12-29 PROCEDURE — 25000003 PHARM REV CODE 250: Performed by: NEUROLOGICAL SURGERY

## 2022-12-29 PROCEDURE — 99024 PR POST-OP FOLLOW-UP VISIT: ICD-10-PCS | Mod: POP,,, | Performed by: NURSE PRACTITIONER

## 2022-12-29 PROCEDURE — 99024 POSTOP FOLLOW-UP VISIT: CPT | Mod: POP,,, | Performed by: NURSE PRACTITIONER

## 2022-12-29 RX ORDER — TRAMADOL HYDROCHLORIDE 50 MG/1
50 TABLET ORAL EVERY 4 HOURS PRN
Qty: 60 TABLET | Refills: 0 | Status: SHIPPED | OUTPATIENT
Start: 2022-12-29 | End: 2023-08-28

## 2022-12-29 RX ORDER — TIZANIDINE 4 MG/1
4 TABLET ORAL EVERY 8 HOURS PRN
Qty: 40 TABLET | Refills: 2 | Status: SHIPPED | OUTPATIENT
Start: 2022-12-29 | End: 2023-01-08

## 2022-12-29 RX ADMIN — GLIMEPIRIDE 2 MG: 1 TABLET ORAL at 09:12

## 2022-12-29 RX ADMIN — TRAMADOL HYDROCHLORIDE 50 MG: 50 TABLET, COATED ORAL at 10:12

## 2022-12-29 RX ADMIN — DILTIAZEM HYDROCHLORIDE 120 MG: 120 CAPSULE, COATED, EXTENDED RELEASE ORAL at 09:12

## 2022-12-29 NOTE — DISCHARGE INSTRUCTIONS
-OK to shower and get incision wet. Do not submerge incision under water.   -Continue with a soft diet after discharge. Notify Office with any swallowing difficulty or coughing when eating/drinking  -Soft collar as needed for comfort  -No heavy lifting  -Call the office with any new/worsening pain, numbness, weakness or incision issues.   -OK to resume any medications that were stopped for surgery on POD #5 (1/1/2023)

## 2022-12-29 NOTE — PROGRESS NOTES
Ms. Alanis verbalized understanding of all discharge instructions, prescription usage, wound care, importance of keeping scheduled follow-up appointments, and s/s to be aware of that require immediate medical attention. All questions and concerns addressed at this time. She is dressed, belongings are packed, and she is awaiting transportation and delivery of meds.

## 2022-12-29 NOTE — PROGRESS NOTES
Hospital Progress Note  [facility] Neurosurgery    Admit Date: 12/27/2022  Post-operative Day: 1 Day Post-Op  Hospital Day: 2    SUBJECTIVE:   Follow-up For:  Procedure(s) (LRB):  REPLACEMENT, INTERVERTEBRAL DISC, CERVICAL, TOTAL (N/A)    POD 1 s/p C5-6, C6-7 total disc replacement      Interval History:  Patient resting in bed.  Main complaint is some right trapezius spasms.  She states some improvement with Robaxin.  Does not find the Norco helps at all with the pain.  Her preoperative neck pain and arm symptoms are resolved.  She denies any new upper extremity pain, numbness, tingling or weakness.  She denies any swallowing trouble.  Minimal odynophagia.  Family is at bedside    Scheduled Meds:   diltiaZEM  120 mg Oral Daily    glimepiride  2 mg Oral BID    mupirocin   Nasal BID     Continuous Infusions:  PRN Meds:aluminum-magnesium hydroxide-simethicone, bisacodyL, dextrose 10%, dextrose 10%, glucagon (human recombinant), glucose, glucose, HYDROmorphone, insulin aspart U-100, morphine, ondansetron, prochlorperazine, senna-docusate 8.6-50 mg, tiZANidine, traMADoL    Review of patient's allergies indicates:  No Known Allergies  Past Medical History:   Diagnosis Date    Anemia     Anxiety     Arm pain, right     Cervical pain     Depression     Diabetes     GERD (gastroesophageal reflux disease)     Headache     Heart palpitations     Hyperlipidemia     Irregular heart beat     Postoperative hypothyroidism     Shoulder pain     Sleep apnea     does not use CPAP    Unspecified osteoarthritis, unspecified site      Past Surgical History:   Procedure Laterality Date    ABDOMINAL SURGERY      AUGMENTATION OF BREAST      BLADDER SUSPENSION      BUNIONECTOMY Right     CARPAL TUNNEL RELEASE Bilateral     COLONOSCOPY      DECOMPRESSION OF ULNAR NERVE      ESOPHAGOGASTRODUODENOSCOPY      HIP SURGERY Right     HYSTERECTOMY      vaginal; partial    THYROIDECTOMY      due to goiter, Hoshimoto's disease     TONSILLECTOMY      TRIGGER FINGER RELEASE      left 3rd finger       OBJECTIVE:     Vital Signs (Most Recent)  Temp: 98.5 °F (36.9 °C) (12/28/22 1602)  Pulse: 64 (12/28/22 1602)  Resp: 16 (12/28/22 1740)  BP: 137/75 (12/28/22 1602)  SpO2: 98 % (12/28/22 1602)    Vital Signs Range (Last 24H):  Temp:  [97.9 °F (36.6 °C)-98.5 °F (36.9 °C)]   Pulse:  [64-89]   Resp:  [16-20]   BP: (100-150)/(60-80)   SpO2:  [94 %-98 %]     I & O (Last 24H):  Intake/Output Summary (Last 24 hours) at 12/28/2022 1807  Last data filed at 12/28/2022 1357  Gross per 24 hour   Intake 1320 ml   Output 70 ml   Net 1250 ml       Physical Exam:  VSS, afebrile  Awake alert and oriented  Moves all extremities well without any focal deficits in upper extremity motor exam.    Voice is clear    Wound/Incision:  clean, dry, intact - Dermabond in place   AZAM drain with serosanguineous output-60, 40, 10 mL output since surgery    Lines/Drains:       Peripheral IV - Single Lumen 12/27/22 0545 18 G Anterior;Left Forearm (Active)   Site Assessment Clean;Dry;Intact;No redness;No swelling;No drainage;No warmth 12/28/22 1200   Extremity Assessment Distal to IV No warmth;No swelling;No redness;No abnormal discoloration 12/28/22 0738   Line Status Infusing 12/28/22 1200   Dressing Status Clean;Dry;Intact 12/28/22 1200   Dressing Intervention Integrity maintained 12/28/22 1200   Number of days: 1            Closed/Suction Drain 12/27/22 0930 Right;Anterior Neck Bulb 10 Fr. (Active)   Dressing Type Transparent (Tegaderm) 12/28/22 0738   Dressing Status Clean;Dry;Intact 12/28/22 0738   Dressing Intervention Integrity maintained 12/28/22 0738   Drainage Bloody 12/28/22 0738   Status To bulb suction 12/28/22 0738   Output (mL) 20 mL 12/28/22 1200   Number of days: 1       ASSESSMENT/PLAN:     Problem List Items Addressed This Visit          Neuro    * (Principal) Cervical spondylosis with radiculopathy    Relevant Orders    Chlorhexidine (CHG) 2% Wipes     Chlorohexidine Gluconate Bath    Place in Outpatient (Completed)    Place ALESHA hose    Place sequential compression device    Nursing communication       PLAN   Norco changed Percocet   Change Robaxin to tizanidine   Continue AZAM drain   Increase activity as tolerated.    Soft collar as needed for comfort  Ice packs to posterior neck q.i.d. and p.r.n. for pain  Pain better controlled with changes in medications she could potentially go home later today.  Discussed with nurse as well as the patient and her family.  We will check on her a little bit later today.      JOSÉ ANTONIO Loya-ACNP

## 2022-12-30 NOTE — OP NOTE
DATE OF OPERATION:   December 27, 2022     PREOPERATIVE DIAGNOSIS:   1. Cervical spondylosis with radiculopathy     POSTOPERATIVE DIAGNOSIS:   1. Cervical spondylosis with radiculopathy     SURGEON:  Daniel Melissa M.D.    ASSISTANT: SEEMA Loya     PROCEDURE:   1.  C5-6 disc arthroplasty with the ProDisc Vivo Total Disc Replacement system (5 mm medium footprint implant)  2.  C6-7 disc arthroplasty with the ProDisc Vivo Total Disc Replacement system (5 mm medium footprint implant)  3. Microdissection for spinal procedure    ANESTHESIA:   General endotracheal     BLOOD LOSS:   Less than 50 cc     SPECIMEN(s):   None     COMPLICATIONS:   None     HISTORY:   The patient is a 67-year-old woman with longstanding neck, shoulder and upper arm pain with numbness and weakness, especially in her hands.  Imaging studies showed significant spondylosis at C5-6 and C6-7 with central and foraminal stenosis.  Options were discussed and, after conservative management failed, surgery was elected. The patient understood and accepted the nature of this surgery as well as its attendant risks.     FINDINGS:   There were no untoward findings.  As expected, there was severe spondylosis at both levels with excellent nerve root and thecal sac decompression as well as excellent fit of the disc devices at each level.  The patient tolerated the procedure well.      PROCEDURE IN DETAIL:   After endotracheal intubation and induction of general anesthesia, the patient's head was placed on a doughnut and a roll was placed under the shoulders. Intraoperative neuromonitoring electrodes were put into place, and both EMG, SSEP and, when appropriate, motor evoked potential monitoring were carried out continuously throughout the procedure. The neck was prepped and draped in the usual fashion.  The patient received intravenous antibiotics prior to the start of the procedure. Intraoperative C-arm fluoroscopy was used to both localize the  incision and guide the placement of the graft and hardware. An incision was marked out at the appropriate location and infiltrated with local anesthetic containing epinephrine. The incision was carried down through the skin and subcutaneous tissues with a knife. Unipolar cautery was used to incise the platysma. Circumferential subplatysmal dissection was carried out to allow better retraction. Then the fascial sheath was divided sharply, and sharp and blunt dissection were carried out medial to the sternocleidomastoid and carotid sheath and lateral to the trachea and esophagus down to the level of the prevertebral fascia.  After confirming correct interspace localization, the longus colli musculature was undercut bilaterally at the operative levels. The self-retaining retractor system was put into place, and the air was released from the endotracheal tube cuff to minimize pressure along the recurrent laryngeal nerve.       The operating microscope was brought into place. The disc was incised sharply with a knife. Care was taken to wax any exposed bone above and below the incident level. Disc was removed with curettes and pituitary rongeurs down to the posterior endplates. The posterior longitudinal ligament was released, and then a balanced foraminal decompression was carried out with attempt to preserve as much of the uncinate process as possible. An appropriate sized trial was put into place nearly flush with the posterior edge of the endplate and an AP x-ray was taken to ensure midline placement and maximal coverage of the interspace. Then the appropriate sized implant was tapped into place and positioned optimally.  The inserting device was removed and AP and lateral x-rays were taken to confirm appropriate placement. Areas of raw bone surface were waxed.  Attention was then paid to the next level where essentially the same procedure was carried out.     The wound was irrigated copiously with antibiotic  irrigation followed by irrigation with half-strength peroxide.  The wound was then closed with 3-0 Vicryl for the platysma and a running 4-0 Monocryl suture for the subcuticular. Dermabond glue was used for the skin edges. The patient was then taken to the post-anesthetic care unit in satisfactory condition with correct sponge and needle counts.

## 2023-01-10 NOTE — DISCHARGE SUMMARY
[facility] - Ortho Neuro  Neurosurgery  Discharge Summary      Patient Name: Lachelle Alanis  MRN: [id number]  Admission Date: 12/27/2022  Hospital Length of Stay: 0 days  Discharge Date and Time: 12/29/2022 12:50 PM  Attending Physician: No att. providers found   Discharging Provider: Kendra Espinoza [facility]  Primary Care Provider: Ngozi Trevino MD    HPI:   No notes on file    Procedure(s) (LRB):  REPLACEMENT, INTERVERTEBRAL DISC, CERVICAL, TOTAL (N/A)     Hospital Course: The patient was taken to the operating room on 12/27/2022 for C5-6, C6-7 total disc arthroplasty.  She tolerated the procedure well was admitted to the ortho neuro floor postoperatively.  On POD 1. She reported improvement in her preoperative arm symptoms.  She was having some new right trapezius spasms.  Her pain was not well controlled and pain medications were adjusted.  The following day she had better control of her pain.  She was able to increase her activities without any issues.  She was tolerating oral intake without any swallowing difficulty.  She had some mild odynophagia.  She was voiding without issues.  She was able to be discharged home in improved state with a good prognosis on 12/29/2022.      Goals of Care Treatment Preferences:         Consults:     Significant Diagnostic Studies: Labs: BMP: No results for input(s): GLU, NA, K, CL, CO2, BUN, CREATININE, CALCIUM, MG in the last 48 hours. and CBC No results for input(s): WBC, HGB, HCT, PLT in the last 48 hours.    Pending Diagnostic Studies:     None        Final Active Diagnoses:    Diagnosis Date Noted POA    PRINCIPAL PROBLEM:  Cervical spondylosis with radiculopathy [M47.22] 06/21/2022 Yes      Problems Resolved During this Admission:      Discharged Condition: good     Disposition: Home or Self Care    Follow Up:   Follow-up Information     Daniel Melissa MD. Go on 1/18/2023.    Specialty: Neurosurgery  Why: Follow up Jan. 18, 2023 @10am  Contact  information:  [address] [place] [address] [address]  [address]  [place] [address] [phone]                       Patient Instructions:   -OK to shower and get incision wet. Do not submerge incision under water.   -Continue with a soft diet after discharge. Notify Office with any swallowing difficulty or coughing when eating/drinking  -Soft collar as needed for comfort  -No heavy lifting  -Call the office with any new/worsening pain, numbness, weakness or incision issues.   -OK to resume any medications that were stopped for surgery on POD #5 (1/1/2023)    Medications:  Reconciled Home Medications:      Medication List      START taking these medications    traMADoL 50 mg tablet  Commonly known as: ULTRAM  Take 1 tablet (50 mg total) by mouth every 4 (four) hours as needed for Pain.        CHANGE how you take these medications    CARTIA  MG Cp24  Generic drug: diltiaZEM  Take 120 mg by mouth once daily.  What changed: Another medication with the same name was removed. Continue taking this medication, and follow the directions you see here.        CONTINUE taking these medications    alendronate 70 MG tablet  Commonly known as: FOSAMAX  Take 70 mg by mouth every 7 days.     black cohosh 40 mg Tab  Take 2 tablets by mouth once daily.     cholecalciferol (vitamin D3) 125 mcg (5,000 unit) capsule  Take 5,000 Units by mouth once daily.     co-enzyme Q-10 30 mg capsule  Take 200 mg by mouth once daily.     cyanocobalamin 1000 MCG tablet  Commonly known as: VITAMIN B-12  Take 1,000 mcg by mouth every Mon, Wed, Fri.     fenofibrate 54 MG tablet  Commonly known as: TRICOR  Take 54 mg by mouth once daily.     glimepiride 2 MG tablet  Commonly known as: AMARYL  Take 4 mg by mouth 2 (two) times a day.     GLUCOSAMINE 1500 COMPLEX ORAL  Take 1 tablet by mouth 2 (two) times a day.     levothyroxine 88 MCG tablet  Commonly known as: SYNTHROID  Take 88 mcg by mouth before breakfast.     metoprolol succinate 25 MG 24 hr tablet  Commonly  known as: TOPROL-XL  Take 37.5 mg by mouth 2 (two) times daily.     niacin 500 MG Cpsr  Take 1,000 mg by mouth every evening.     pantoprazole 20 MG tablet  Commonly known as: PROTONIX  Take 20 mg by mouth once daily.     rosuvastatin 10 MG tablet  Commonly known as: CRESTOR  Take 10 mg by mouth once daily.     sertraline 100 MG tablet  Commonly known as: ZOLOFT  Take 100 mg by mouth once daily.     TOUJEO SOLOSTAR U-300 INSULIN 300 unit/mL (1.5 mL) Inpn pen  Generic drug: insulin glargine (TOUJEO)  Inject 38 Units into the skin once daily.        STOP taking these medications    ascorbic acid (vitamin C) 1000 MG tablet  Commonly known as: VITAMIN C     aspirin 81 MG Chew     metFORMIN 500 MG tablet  Commonly known as: GLUCOPHAGE     omega-3 acid ethyl esters 1 gram capsule  Commonly known as: LOVAZA     vitamin E 400 UNIT capsule        ASK your doctor about these medications    tiZANidine 4 MG tablet  Commonly known as: ZANAFLEX  Take 1 tablet (4 mg total) by mouth every 8 (eight) hours as needed (muscle spasms).  Ask about: Should I take this medication?            Kendra Espinoza [facility]  Neurosurgery  [facility]

## 2023-01-10 NOTE — HOSPITAL COURSE
The patient was taken to the operating room on 12/27/2022 for C5-6, C6-7 total disc arthroplasty.  She tolerated the procedure well was admitted to the ortho neuro floor postoperatively.  On POD 1. She reported improvement in her preoperative arm symptoms.  She was having some new right trapezius spasms.  Her pain was not well controlled and pain medications were adjusted.  The following day she had better control of her pain.  She was able to increase her activities without any issues.  She was tolerating oral intake without any swallowing difficulty.  She had some mild odynophagia.  She was voiding without issues.  She was able to be discharged home in improved state with a good prognosis on 12/29/2022.

## 2023-01-12 ENCOUNTER — HOSPITAL ENCOUNTER (OUTPATIENT)
Dept: RADIOLOGY | Facility: HOSPITAL | Age: 68
Discharge: HOME | End: 2023-01-12
Attending: NURSE PRACTITIONER
Payer: MEDICARE

## 2023-01-12 DIAGNOSIS — M47.22 CERVICAL SPONDYLOSIS WITH RADICULOPATHY: ICD-10-CM

## 2023-01-12 PROCEDURE — 72040 X-RAY EXAM NECK SPINE 2-3 VW: CPT | Mod: TC

## 2023-01-18 ENCOUNTER — OFFICE VISIT (OUTPATIENT)
Dept: NEUROSURGERY | Facility: CLINIC | Age: 68
End: 2023-01-18
Payer: MEDICARE

## 2023-01-18 VITALS
WEIGHT: 157 LBS | HEART RATE: 68 BPM | HEIGHT: 60 IN | BODY MASS INDEX: 30.82 KG/M2 | RESPIRATION RATE: 16 BRPM | DIASTOLIC BLOOD PRESSURE: 68 MMHG | SYSTOLIC BLOOD PRESSURE: 108 MMHG

## 2023-01-18 DIAGNOSIS — M47.22 CERVICAL SPONDYLOSIS WITH RADICULOPATHY: Primary | ICD-10-CM

## 2023-01-18 DIAGNOSIS — G89.18 ACUTE POST-OPERATIVE PAIN: ICD-10-CM

## 2023-01-18 PROCEDURE — 99024 POSTOP FOLLOW-UP VISIT: CPT | Mod: POP,,, | Performed by: NEUROLOGICAL SURGERY

## 2023-01-18 PROCEDURE — 99024 PR POST-OP FOLLOW-UP VISIT: ICD-10-PCS | Mod: POP,,, | Performed by: NEUROLOGICAL SURGERY

## 2023-01-18 RX ORDER — GABAPENTIN 300 MG/1
300 CAPSULE ORAL 3 TIMES DAILY
Qty: 90 CAPSULE | Refills: 2 | Status: SHIPPED | OUTPATIENT
Start: 2023-01-18 | End: 2023-08-28

## 2023-01-18 RX ORDER — METHOCARBAMOL 500 MG/1
500-1000 TABLET, FILM COATED ORAL 3 TIMES DAILY PRN
Qty: 60 TABLET | Refills: 2 | Status: SHIPPED | OUTPATIENT
Start: 2023-01-18 | End: 2023-02-17

## 2023-01-18 RX ORDER — METFORMIN HYDROCHLORIDE 500 MG/1
TABLET, EXTENDED RELEASE ORAL
COMMUNITY
Start: 2022-10-02 | End: 2023-08-28

## 2023-01-18 RX ORDER — PANCRELIPASE LIPASE, PANCRELIPASE PROTEASE, PANCRELIPASE AMYLASE 40000; 126000; 168000 [USP'U]/1; [USP'U]/1; [USP'U]/1
CAPSULE, DELAYED RELEASE ORAL
COMMUNITY
Start: 2022-08-03 | End: 2023-08-28

## 2023-01-18 RX ORDER — MELOXICAM 15 MG/1
15 TABLET ORAL DAILY
Qty: 30 TABLET | Refills: 2 | Status: SHIPPED | OUTPATIENT
Start: 2023-01-18 | End: 2023-08-28

## 2023-01-18 NOTE — PROGRESS NOTES
"[facility]  Neurosurgery        Lachelle Alanis   [id number]   1955       SUBJECTIVE:     CHIEF COMPLAINT:    3 weeks post-op    HPI:    Lachelle Alanis is a 67 y.o.-year-old female who presents today for post-operative follow-up.  She is s/p C5-6, C6-7 TDR that was done on 12/27/22.  She continues with a great deal of pain in the neck and right shoulder area.  She had severe pain in the right shoulder immediately after surgery, but says it is almost completely gone now.  The pain is mainly in the neck and shoulder blade.  She has spasms in the right trapezius region as well.  She notes an increase in pain with lateral bending and rotation to either side.  Her preoperative headaches and arm pain have resolved.  She is not taking anything for the pain or spasms currently.  She stopped the Tramadol and Tizanidine due to side effects.  She reports being "out of it" and hallucinating on the medications.  She had similar side effects with Percocet in the hospital.  She has been alternating Tylenol and Motrin, but her pain is not well controlled.         Review of patient's allergies indicates:  No Known Allergies  Current Outpatient Medications   Medication Sig Dispense Refill    alendronate (FOSAMAX) 70 MG tablet Take 70 mg by mouth every 7 days.      black cohosh 40 mg Tab Take 2 tablets by mouth once daily.      CARTIA  mg Cp24 Take 120 mg by mouth once daily.      cholecalciferol, vitamin D3, 125 mcg (5,000 unit) capsule Take 5,000 Units by mouth once daily.      co-enzyme Q-10 30 mg capsule Take 200 mg by mouth once daily.      cyanocobalamin (VITAMIN B-12) 1000 MCG tablet Take 1,000 mcg by mouth every Mon, Wed, Fri.      fenofibrate (TRICOR) 54 MG tablet Take 54 mg by mouth once daily.      glimepiride (AMARYL) 2 MG tablet Take 4 mg by mouth 2 (two) times a day.      glucosamine/chondroitin/C/Farzad (GLUCOSAMINE 1500 COMPLEX ORAL) Take 1 tablet by mouth 2 (two) times a day.      insulin " glargine, TOUJEO, (TOUJEO SOLOSTAR U-300 INSULIN) 300 unit/mL (1.5 mL) InPn pen Inject 38 Units into the skin once daily.      levothyroxine (SYNTHROID) 88 MCG tablet Take 88 mcg by mouth before breakfast.      metFORMIN (GLUCOPHAGE-XR) 500 MG ER 24hr tablet       metoprolol succinate (TOPROL-XL) 25 MG 24 hr tablet Take 37.5 mg by mouth 2 (two) times daily.      niacin 500 MG CpSR Take 1,000 mg by mouth every evening.      pantoprazole (PROTONIX) 20 MG tablet Take 20 mg by mouth once daily.      rosuvastatin (CRESTOR) 10 MG tablet Take 10 mg by mouth once daily.      sertraline (ZOLOFT) 100 MG tablet Take 100 mg by mouth once daily.      ZENPEP 40,000-126,000- 168,000 unit CpDR Take by mouth.      emollient combination no.60 Gel Apply thin layer to incisional scar three to five times a day as needed 113 g 1    gabapentin (NEURONTIN) 300 MG capsule Take 1 capsule (300 mg total) by mouth 3 (three) times daily. 90 capsule 2    meloxicam (MOBIC) 15 MG tablet Take 1 tablet (15 mg total) by mouth once daily. 30 tablet 2    methocarbamoL (ROBAXIN) 500 MG Tab Take 1-2 tablets (500-1,000 mg total) by mouth 3 (three) times daily as needed (muscle spasms). 60 tablet 2    traMADoL (ULTRAM) 50 mg tablet Take 1 tablet (50 mg total) by mouth every 4 (four) hours as needed for Pain. (Patient not taking: Reported on 1/18/2023) 60 tablet 0     No current facility-administered medications for this visit.     Past Medical History:   Diagnosis Date    Anemia     Anxiety     Arm pain, right     Cervical spondylosis with radiculopathy     Depression     Diabetes     GERD (gastroesophageal reflux disease)     Headache     Heart palpitations     Hyperlipidemia     Irregular heart beat     Postoperative hypothyroidism     Shoulder pain     Sleep apnea     does not use CPAP    Unspecified osteoarthritis, unspecified site      Past Surgical History:   Procedure Laterality Date    ABDOMINAL SURGERY      AUGMENTATION OF BREAST      BLADDER  SUSPENSION      BUNIONECTOMY Right     CARPAL TUNNEL RELEASE Bilateral     COLONOSCOPY      DECOMPRESSION OF ULNAR NERVE      ESOPHAGOGASTRODUODENOSCOPY      HIP SURGERY Right     HYSTERECTOMY      vaginal; partial    THYROIDECTOMY      due to goiter, Hoshimoto's disease    TONSILLECTOMY      TOTAL REPLACEMENT OF CERVICAL INTERVERTEBRAL DISC N/A 12/27/2022    Procedure: REPLACEMENT, INTERVERTEBRAL DISC, CERVICAL, TOTAL;  Surgeon: Daniel Melissa MD;  Location: [facility] OR;  Service: Neurosurgery;  Laterality: N/A;  C5-6, C6-7 TOTAL DISC REPLACEMENT, ACDF IF NECESSARY  NTI //  JAY    TRIGGER FINGER RELEASE      left 3rd finger     Family History       Problem Relation (Age of Onset)    Cancer Mother    Diabetes Mellitus Mother, Father    Heart disease Mother, Father    Hyperlipidemia Mother    Hypertension Father          Social History     Socioeconomic History    Marital status:    Tobacco Use    Smoking status: Former     Packs/day: 0.50     Years: 30.00     Pack years: 15.00     Types: Cigarettes    Smokeless tobacco: Never   Substance and Sexual Activity    Alcohol use: Not Currently    Drug use: Not Currently     Social Determinants of Health     Financial Resource Strain: Low Risk     Difficulty of Paying Living Expenses: Not hard at all   Food Insecurity: Unknown    Ran Out of Food in the Last Year: Never true   Transportation Needs: No Transportation Needs    Lack of Transportation (Medical): No    Lack of Transportation (Non-Medical): No   Housing Stability: Unknown    Unable to Pay for Housing in the Last Year: No    Unstable Housing in the Last Year: No       Review of systems:    Pertinent items are noted in HPI.      OBJECTIVE:     Vital Signs  Pulse: 68  Resp: 16  BP: 108/68  Pain Score:   3  Height: 5' (152.4 cm)  Weight: 71.2 kg (157 lb)  Body mass index is 30.66 kg/m².      Physical Exam:    General:  Pleasant. Well-nourished. Alert. No acute distress.    Head:  Normocephalic, without obvious  abnormality, atraumatic    Lungs:   Breathing is quiet, non-lablored    Neurological:    Oriented to Person, Place, Time   Speech:  normal  Memory, cognition, and affect are appropriate.  Motor Strength: Moves all extremities spontaneously with good tone.  No abnormal movements seen.      Cervical incision:  Well healed    Gait:  normal    Cervical x-rays show satisfactory position of the disc devices.  There was nicely preserved lordosis.  I think she is just having more trouble than most with muscle spasm at this point.     ASSESSMENT/PLAN:     1. Cervical spondylosis with radiculopathy  - emollient combination no.60 Gel (Celacyn); Apply thin layer to incisional scar three to five times a day as needed  Dispense: 113 g; Refill: 1    2. Acute post-operative pain  - methocarbamoL (ROBAXIN) 500 MG Tab; Take 1-2 tablets (500-1,000 mg total) by mouth 3 (three) times daily as needed (muscle spasms).  Dispense: 60 tablet; Refill: 2  - gabapentin (NEURONTIN) 300 MG capsule; Take 1 capsule (300 mg total) by mouth 3 (three) times daily.  Dispense: 90 capsule; Refill: 2  - meloxicam (MOBIC) 15 MG tablet; Take 1 tablet (15 mg total) by mouth once daily.  Dispense: 30 tablet; Refill: 2  - Norco 5-325mg tablet; Take 1 tablet by mouth every 6hrs as needed for pain.  Dispense: 28, no refill  - Follow up 6 weeks post op          I, Dr. Daniel Melissa, personally performed the services described in this documentation. All medical record entries made by the scribe, Danica Reed RN, were at my direction and in my presence.  I have reviewed the chart and agree that the record reflects my personal performance and is accurate and complete. Daniel Melissa MD.  1:41 PM 01/18/2023           Daniel Melissa MD FACS [facility]

## 2023-01-18 NOTE — PATIENT INSTRUCTIONS
Neurontin (gabapentin) instructions:    - take 1 capsule at bedtime x 5-7 days, then 1 capsule twice a day x 5-7 days, then 1 capsule three times a day    **okay to continue increasing as tolerated by adding one capsule to each dose every 5-7 days, not to exceed 3600mg/day**

## 2023-01-26 RX ORDER — HYDROCODONE BITARTRATE AND ACETAMINOPHEN 5; 325 MG/1; MG/1
1 TABLET ORAL EVERY 6 HOURS PRN
Qty: 28 TABLET | Refills: 0 | Status: SHIPPED | OUTPATIENT
Start: 2023-01-26 | End: 2023-08-28

## 2023-02-06 ENCOUNTER — OFFICE VISIT (OUTPATIENT)
Dept: NEUROSURGERY | Facility: CLINIC | Age: 68
End: 2023-02-06
Payer: MEDICARE

## 2023-02-06 VITALS
RESPIRATION RATE: 20 BRPM | DIASTOLIC BLOOD PRESSURE: 68 MMHG | WEIGHT: 159 LBS | BODY MASS INDEX: 31.22 KG/M2 | SYSTOLIC BLOOD PRESSURE: 114 MMHG | HEIGHT: 60 IN | HEART RATE: 61 BPM

## 2023-02-06 DIAGNOSIS — M47.22 CERVICAL SPONDYLOSIS WITH RADICULOPATHY: Primary | ICD-10-CM

## 2023-02-06 PROCEDURE — 99024 POSTOP FOLLOW-UP VISIT: CPT | Mod: POP,,, | Performed by: PHYSICIAN ASSISTANT

## 2023-02-06 PROCEDURE — 99024 PR POST-OP FOLLOW-UP VISIT: ICD-10-PCS | Mod: POP,,, | Performed by: PHYSICIAN ASSISTANT

## 2023-02-06 NOTE — PROGRESS NOTES
[facility]  History & Physical  Neurosurgery      Lachelle Alanis   [id number]   1955       CHIEF COMPLAINT:  Neck pain    HPI:  Lachelle Alanis is a 67 y.o. female who presents for 6 week postoperative follow up appointment.  On 12/27/2022, the patient underwent C5-6 and C6-7 total disc arthroplasty.  She continues with neck pain as well as pain into the upper trapezius muscles, especially on the right.  She is performing stretching exercises at home.  She has tolerated increasing her level of activity.  She has to take breaks secondary to fibromyalgia.  Yet, overall she is doing well.  Her symptoms are improved as compared to prior to surgery.      Past Medical History:   Diagnosis Date    Anemia     Anxiety     Arm pain, right     Cervical spondylosis with radiculopathy     Depression     Diabetes     GERD (gastroesophageal reflux disease)     Headache     Heart palpitations     Hyperlipidemia     Irregular heart beat     Postoperative hypothyroidism     Shoulder pain     Sleep apnea     does not use CPAP    Unspecified osteoarthritis, unspecified site        Past Surgical History:   Procedure Laterality Date    ABDOMINAL SURGERY      AUGMENTATION OF BREAST      BLADDER SUSPENSION      BUNIONECTOMY Right     CARPAL TUNNEL RELEASE Bilateral     COLONOSCOPY      DECOMPRESSION OF ULNAR NERVE      ESOPHAGOGASTRODUODENOSCOPY      HIP SURGERY Right     HYSTERECTOMY      vaginal; partial    THYROIDECTOMY      due to goiter, Hoshimoto's disease    TONSILLECTOMY      TOTAL REPLACEMENT OF CERVICAL INTERVERTEBRAL DISC N/A 12/27/2022    Procedure: REPLACEMENT, INTERVERTEBRAL DISC, CERVICAL, TOTAL;  Surgeon: Daniel Melissa MD;  Location: [facility];  Service: Neurosurgery;  Laterality: N/A;  C5-6, C6-7 TOTAL DISC REPLACEMENT, ACDF IF NECESSARY  NTI //  JAY    TRIGGER FINGER RELEASE      left 3rd finger       Family History   Problem Relation Age of Onset    Diabetes Mellitus Mother     Heart disease Mother      Hyperlipidemia Mother     Cancer Mother     Diabetes Mellitus Father     Heart disease Father     Hypertension Father        Social History     Socioeconomic History    Marital status:    Tobacco Use    Smoking status: Former     Packs/day: 0.50     Years: 30.00     Pack years: 15.00     Types: Cigarettes    Smokeless tobacco: Never   Substance and Sexual Activity    Alcohol use: Not Currently    Drug use: Not Currently     Social Determinants of Health     Financial Resource Strain: Low Risk     Difficulty of Paying Living Expenses: Not hard at all   Food Insecurity: Unknown    Ran Out of Food in the Last Year: Never true   Transportation Needs: No Transportation Needs    Lack of Transportation (Medical): No    Lack of Transportation (Non-Medical): No   Housing Stability: Unknown    Unable to Pay for Housing in the Last Year: No    Unstable Housing in the Last Year: No       Review of patient's allergies indicates:  No Known Allergies     Medication List with Changes/Refills   Current Medications    ALENDRONATE (FOSAMAX) 70 MG TABLET    Take 70 mg by mouth every 7 days.    BLACK COHOSH 40 MG TAB    Take 2 tablets by mouth once daily.    CARTIA  MG CP24    Take 120 mg by mouth once daily.    CHOLECALCIFEROL, VITAMIN D3, 125 MCG (5,000 UNIT) CAPSULE    Take 5,000 Units by mouth once daily.    CO-ENZYME Q-10 30 MG CAPSULE    Take 200 mg by mouth once daily.    CYANOCOBALAMIN (VITAMIN B-12) 1000 MCG TABLET    Take 1,000 mcg by mouth every Mon, Wed, Fri.    EMOLLIENT COMBINATION NO.60 GEL    Apply thin layer to incisional scar three to five times a day as needed    FENOFIBRATE (TRICOR) 54 MG TABLET    Take 54 mg by mouth once daily.    GABAPENTIN (NEURONTIN) 300 MG CAPSULE    Take 1 capsule (300 mg total) by mouth 3 (three) times daily.    GLIMEPIRIDE (AMARYL) 2 MG TABLET    Take 4 mg by mouth 2 (two) times a day.    GLUCOSAMINE/CHONDROITIN/C/ARASH (GLUCOSAMINE 1500 COMPLEX ORAL)    Take 1 tablet by  mouth 2 (two) times a day.    HYDROCODONE-ACETAMINOPHEN (NORCO) 5-325 MG PER TABLET    Take 1 tablet by mouth every 6 (six) hours as needed for Pain.    INSULIN GLARGINE, TOUJEO, (TOUJEO SOLOSTAR U-300 INSULIN) 300 UNIT/ML (1.5 ML) INPN PEN    Inject 38 Units into the skin once daily.    LEVOTHYROXINE (SYNTHROID) 88 MCG TABLET    Take 88 mcg by mouth before breakfast.    MELOXICAM (MOBIC) 15 MG TABLET    Take 1 tablet (15 mg total) by mouth once daily.    METFORMIN (GLUCOPHAGE-XR) 500 MG ER 24HR TABLET        METHOCARBAMOL (ROBAXIN) 500 MG TAB    Take 1-2 tablets (500-1,000 mg total) by mouth 3 (three) times daily as needed (muscle spasms).    METOPROLOL SUCCINATE (TOPROL-XL) 25 MG 24 HR TABLET    Take 37.5 mg by mouth 2 (two) times daily.    NIACIN 500 MG CPSR    Take 1,000 mg by mouth every evening.    PANTOPRAZOLE (PROTONIX) 20 MG TABLET    Take 20 mg by mouth once daily.    ROSUVASTATIN (CRESTOR) 10 MG TABLET    Take 10 mg by mouth once daily.    SERTRALINE (ZOLOFT) 100 MG TABLET    Take 100 mg by mouth once daily.    TRAMADOL (ULTRAM) 50 MG TABLET    Take 1 tablet (50 mg total) by mouth every 4 (four) hours as needed for Pain.    ZENPEP 40,000-126,000- 168,000 UNIT CPDR    Take by mouth.        ROS:    Review of Systems   Constitutional:  Negative for chills and fever.   HENT:  Negative for nosebleeds and sore throat.    Eyes:  Negative for pain and visual disturbance.   Respiratory:  Negative for cough, chest tightness and shortness of breath.    Cardiovascular:  Negative for chest pain.   Gastrointestinal:  Negative for diarrhea, nausea and vomiting.   Genitourinary:  Negative for difficulty urinating, dysuria and hematuria.   Musculoskeletal:  Positive for neck pain. Negative for gait problem and myalgias.   Skin:  Negative for rash.   Neurological:  Negative for dizziness, facial asymmetry and headaches.   Psychiatric/Behavioral:  Negative for confusion and sleep disturbance. The patient is not  nervous/anxious.        Physical Examination:    Vital Signs:  /68 (BP Location: Other (Comment), Patient Position: Sitting)   Pulse 61   Resp 20   Ht 5' (1.524 m)   Wt 72.1 kg (159 lb)   LMP  (LMP Unknown)   BMI 31.05 kg/m²      General:  Pleasant. Well-nourished. Well-groomed.    Lungs:  Breathing is quiet, non-labored     Musculoskeletal:  Incision is well healed    Neurological:    Oriented to Person, Place, Time   Muscle strength against resistance:  Strength  Deltoids Triceps Biceps Wrist Extension Wrist Flexion Intrinsics   Upper: R 5/5 5/5 5/5 5/5 5/5 5/5    L 5/5 5/5 5/5 5/5 5/5 5/5   Gait is normal.  Coordination is normal      ASSESSMENT/PLAN:     1. Cervical spondylosis with radiculopathy  X-Ray Cervical Spine 5 View W Flex Extxt        Overall, the patient's symptoms are better than what they were prior to surgery.  She has a 10s unit at home.  She will begin to use that unit.  She will return for follow-up for the regular scheduled 3 month postoperative appointment with x-rays obtained just prior to that appointment.        Mary Azul PA-C

## 2023-03-29 ENCOUNTER — OFFICE VISIT (OUTPATIENT)
Dept: NEUROSURGERY | Facility: CLINIC | Age: 68
End: 2023-03-29
Payer: MEDICARE

## 2023-03-29 VITALS
WEIGHT: 158 LBS | HEIGHT: 60 IN | DIASTOLIC BLOOD PRESSURE: 78 MMHG | RESPIRATION RATE: 16 BRPM | SYSTOLIC BLOOD PRESSURE: 118 MMHG | HEART RATE: 63 BPM | BODY MASS INDEX: 31.02 KG/M2

## 2023-03-29 DIAGNOSIS — M47.22 CERVICAL SPONDYLOSIS WITH RADICULOPATHY: Primary | ICD-10-CM

## 2023-03-29 PROCEDURE — 99213 PR OFFICE/OUTPT VISIT, EST, LEVL III, 20-29 MIN: ICD-10-PCS | Mod: ,,, | Performed by: NEUROLOGICAL SURGERY

## 2023-03-29 PROCEDURE — 99213 OFFICE O/P EST LOW 20 MIN: CPT | Mod: ,,, | Performed by: NEUROLOGICAL SURGERY

## 2023-03-29 NOTE — PROGRESS NOTES
[facility]  Neurosurgery        Lachelle Alanis   [id number]   1955       SUBJECTIVE:     CHIEF COMPLAINT:    3 months post-op    HPI:    Lachelle Alanis is a 67 y.o.-year-old female who presents today for post-operative follow-up.  She is s/p C5-6, C6-7 TDR that was done on 12/27/22.  She has continued to improve since her last visit here.  She reports occasional pain in the right upper arm.  It is less severe than her preoperative pain.  This pain was constant prior to surgery.  It does not occur very often now.  She feels it continues to gradually improve.  She reports a great deal of popping in her neck still.  She continues to have difficulty swallowing at times, but says this has been a problem as long as she can remember.  She does not feel it has worsened since the surgery.      She also complains of back pain and pain in both hips.  She has numbness in the left leg when sitting in certain positions.  She has seen Dr. Wayne in the past for the hips.  She says she was told she has bursitis and would likely need hip replacements at some point in the future.        Review of patient's allergies indicates:  No Known Allergies  Current Outpatient Medications   Medication Sig Dispense Refill    alendronate (FOSAMAX) 70 MG tablet Take 70 mg by mouth every 7 days.      black cohosh 40 mg Tab Take 2 tablets by mouth once daily.      CARTIA  mg Cp24 Take 120 mg by mouth once daily.      cholecalciferol, vitamin D3, 125 mcg (5,000 unit) capsule Take 5,000 Units by mouth once daily.      co-enzyme Q-10 30 mg capsule Take 200 mg by mouth once daily.      cyanocobalamin (VITAMIN B-12) 1000 MCG tablet Take 1,000 mcg by mouth every Mon, Wed, Fri.      emollient combination no.60 Gel Apply thin layer to incisional scar three to five times a day as needed 113 g 1    fenofibrate (TRICOR) 54 MG tablet Take 54 mg by mouth once daily.      glimepiride (AMARYL) 2 MG tablet Take 4 mg by mouth daily  with breakfast.      glucosamine/chondroitin/C/Farzad (GLUCOSAMINE 1500 COMPLEX ORAL) Take 1 tablet by mouth 2 (two) times a day.      insulin glargine, TOUJEO, (TOUJEO SOLOSTAR U-300 INSULIN) 300 unit/mL (1.5 mL) InPn pen Inject 58 Units into the skin once daily.      insulin lispro-aabc (LYUMJEV U-100 INSULIN SUBQ) Inject 12 Units into the skin before evening meal.      levothyroxine (SYNTHROID) 88 MCG tablet Take 88 mcg by mouth before breakfast.      meloxicam (MOBIC) 15 MG tablet Take 1 tablet (15 mg total) by mouth once daily. 30 tablet 2    metFORMIN (GLUCOPHAGE-XR) 500 MG ER 24hr tablet       metoprolol succinate (TOPROL-XL) 25 MG 24 hr tablet Take 37.5 mg by mouth 2 (two) times daily.      niacin 500 MG CpSR Take 1,000 mg by mouth every evening.      pantoprazole (PROTONIX) 20 MG tablet Take 20 mg by mouth once daily.      rosuvastatin (CRESTOR) 10 MG tablet Take 10 mg by mouth once daily.      sertraline (ZOLOFT) 100 MG tablet Take 100 mg by mouth once daily.      ZENPEP 40,000-126,000- 168,000 unit CpDR Take by mouth.      gabapentin (NEURONTIN) 300 MG capsule Take 1 capsule (300 mg total) by mouth 3 (three) times daily. (Patient not taking: Reported on 2/6/2023) 90 capsule 2    HYDROcodone-acetaminophen (NORCO) 5-325 mg per tablet Take 1 tablet by mouth every 6 (six) hours as needed for Pain. (Patient not taking: Reported on 2/6/2023) 28 tablet 0    traMADoL (ULTRAM) 50 mg tablet Take 1 tablet (50 mg total) by mouth every 4 (four) hours as needed for Pain. (Patient not taking: Reported on 1/18/2023) 60 tablet 0     No current facility-administered medications for this visit.     Past Medical History:   Diagnosis Date    Anemia     Anxiety     Arm pain, right     Cervical spondylosis with radiculopathy     Depression     Diabetes     GERD (gastroesophageal reflux disease)     Headache     Heart palpitations     Hyperlipidemia     Irregular heart beat     Postoperative hypothyroidism     Shoulder pain      Sleep apnea     does not use CPAP    Unspecified osteoarthritis, unspecified site      Past Surgical History:   Procedure Laterality Date    ABDOMINAL SURGERY      AUGMENTATION OF BREAST      BLADDER SUSPENSION      BUNIONECTOMY Right     CARPAL TUNNEL RELEASE Bilateral     COLONOSCOPY      DECOMPRESSION OF ULNAR NERVE      ESOPHAGOGASTRODUODENOSCOPY      HIP SURGERY Right     HYSTERECTOMY      vaginal; partial    THYROIDECTOMY      due to goiter, Hoshimoto's disease    TONSILLECTOMY      TOTAL REPLACEMENT OF CERVICAL INTERVERTEBRAL DISC N/A 12/27/2022    Procedure: REPLACEMENT, INTERVERTEBRAL DISC, CERVICAL, TOTAL;  Surgeon: Daniel Melissa MD;  Location: [facility] OR;  Service: Neurosurgery;  Laterality: N/A;  C5-6, C6-7 TOTAL DISC REPLACEMENT, ACDF IF NECESSARY  NTI //  JAY    TRIGGER FINGER RELEASE      left 3rd finger     Family History       Problem Relation (Age of Onset)    Cancer Mother    Diabetes Mellitus Mother, Father    Heart disease Mother, Father    Hyperlipidemia Mother    Hypertension Father          Social History     Socioeconomic History    Marital status:    Tobacco Use    Smoking status: Former     Packs/day: 0.50     Years: 30.00     Pack years: 15.00     Types: Cigarettes    Smokeless tobacco: Never   Substance and Sexual Activity    Alcohol use: Not Currently    Drug use: Not Currently     Social Determinants of Health     Financial Resource Strain: Low Risk     Difficulty of Paying Living Expenses: Not hard at all   Food Insecurity: Unknown    Ran Out of Food in the Last Year: Never true   Transportation Needs: No Transportation Needs    Lack of Transportation (Medical): No    Lack of Transportation (Non-Medical): No   Housing Stability: Unknown    Unable to Pay for Housing in the Last Year: No    Unstable Housing in the Last Year: No       Review of systems:    Pertinent items are noted in HPI.      OBJECTIVE:     Vital Signs  Pulse: 63  Resp: 16  BP: 118/78  Pain Score: 0-No  pain  Height: 5' (152.4 cm)  Weight: 71.7 kg (158 lb)  Body mass index is 30.86 kg/m².      Physical Exam:    General:  Pleasant. Well-nourished. Alert. No acute distress.    Head:  Normocephalic, without obvious abnormality, atraumatic    Lungs:   Breathing is quiet, non-lablored    Neurological:    Oriented to Person, Place, Time   Speech:  normal  Memory, cognition, and affect are appropriate.  Motor Strength: Moves all extremities spontaneously with good tone.  No abnormal movements seen.      Cervical incision:  Well healed    Gait:  normal    Cervical x-rays show satisfactory position of the disc devices.  There was nicely preserved lordosis.  No abnormal movement with flexion/extension    ASSESSMENT/PLAN:     1. Cervical spondylosis with radiculopathy    - X-ray cervical spine 5 views w/ flex/ext; Future  - Follow up 1 year post op w/ x-rays      I, Dr. Daniel Melissa, personally performed the services described in this documentation. All medical record entries made by the scribe, Danica Reed RN, were at my direction and in my presence.  I have reviewed the chart and agree that the record reflects my personal performance and is accurate and complete. Daniel Melissa MD.  1:41 PM 03/29/2023           Daniel Melissa MD FACS [facility]

## 2023-08-14 ENCOUNTER — TELEPHONE (OUTPATIENT)
Dept: NEUROSURGERY | Facility: CLINIC | Age: 68
End: 2023-08-14
Payer: MEDICARE

## 2023-08-14 NOTE — TELEPHONE ENCOUNTER
Please schedule the patient sooner rather than later on either my or 's schedule with AP/Lat/Flex/Ext views of the cervical spine. Please try and schedule this on a day when Dr. Melissa will be in clinic if possible.Thanks

## 2023-08-28 ENCOUNTER — OFFICE VISIT (OUTPATIENT)
Dept: NEUROSURGERY | Facility: CLINIC | Age: 68
End: 2023-08-28
Payer: MEDICARE

## 2023-08-28 ENCOUNTER — HOSPITAL ENCOUNTER (OUTPATIENT)
Dept: RADIOLOGY | Facility: HOSPITAL | Age: 68
Discharge: HOME | End: 2023-08-28
Attending: NEUROLOGICAL SURGERY
Payer: MEDICARE

## 2023-08-28 VITALS
HEIGHT: 60 IN | HEART RATE: 63 BPM | RESPIRATION RATE: 16 BRPM | DIASTOLIC BLOOD PRESSURE: 70 MMHG | WEIGHT: 146.81 LBS | SYSTOLIC BLOOD PRESSURE: 110 MMHG | BODY MASS INDEX: 28.82 KG/M2

## 2023-08-28 DIAGNOSIS — R42 VERTIGO: ICD-10-CM

## 2023-08-28 DIAGNOSIS — M47.22 CERVICAL SPONDYLOSIS WITH RADICULOPATHY: ICD-10-CM

## 2023-08-28 DIAGNOSIS — M54.16 LUMBAR RADICULOPATHY: ICD-10-CM

## 2023-08-28 DIAGNOSIS — G44.209 TENSION HEADACHE: Primary | ICD-10-CM

## 2023-08-28 PROCEDURE — 72052 X-RAY EXAM NECK SPINE 6/>VWS: CPT | Mod: TC

## 2023-08-28 PROCEDURE — 99215 OFFICE O/P EST HI 40 MIN: CPT | Mod: ,,, | Performed by: NURSE PRACTITIONER

## 2023-08-28 PROCEDURE — 99215 PR OFFICE/OUTPT VISIT, EST, LEVL V, 40-54 MIN: ICD-10-PCS | Mod: ,,, | Performed by: NURSE PRACTITIONER

## 2023-08-28 RX ORDER — HYDROCODONE BITARTRATE AND ACETAMINOPHEN 7.5; 325 MG/1; MG/1
1 TABLET ORAL
COMMUNITY
Start: 2023-06-28 | End: 2023-08-28

## 2023-08-28 RX ORDER — FLECAINIDE ACETATE 50 MG/1
50 TABLET ORAL 2 TIMES DAILY
COMMUNITY
Start: 2023-08-14

## 2023-08-28 RX ORDER — OMEPRAZOLE 20 MG/1
CAPSULE, DELAYED RELEASE ORAL
COMMUNITY
End: 2023-08-28

## 2023-08-28 RX ORDER — DILTIAZEM HYDROCHLORIDE 180 MG/1
CAPSULE, COATED, EXTENDED RELEASE ORAL
COMMUNITY
Start: 2023-06-22 | End: 2024-02-14

## 2023-08-28 RX ORDER — LEVOTHYROXINE SODIUM 100 UG/1
100 TABLET ORAL EVERY MORNING
COMMUNITY
Start: 2023-08-18 | End: 2023-11-08

## 2023-08-28 RX ORDER — DIAZEPAM 10 MG/1
10 TABLET ORAL 2 TIMES DAILY
COMMUNITY
Start: 2023-06-28 | End: 2023-08-28

## 2023-08-28 RX ORDER — BLOOD-GLUCOSE METER
1 EACH MISCELLANEOUS 3 TIMES DAILY
COMMUNITY
Start: 2023-05-01 | End: 2023-08-28

## 2023-08-28 RX ORDER — PEN NEEDLE, DIABETIC 32 GX 1/4"
NEEDLE, DISPOSABLE MISCELLANEOUS
COMMUNITY
Start: 2023-03-13 | End: 2023-08-28

## 2023-08-28 RX ORDER — OMEGA-3-ACID ETHYL ESTERS 1 G/1
2 CAPSULE, LIQUID FILLED ORAL 2 TIMES DAILY
COMMUNITY
Start: 2023-06-21

## 2023-08-28 RX ORDER — PROMETHAZINE HYDROCHLORIDE 25 MG/1
25 TABLET ORAL EVERY 8 HOURS PRN
COMMUNITY
Start: 2023-06-28 | End: 2023-08-28

## 2023-08-28 NOTE — PROGRESS NOTES
[facility]  History & Physical  Neurosurgery      Lachelle Alanis   [id number]   1955     SUBJECTIVE:     CHIEF COMPLAINT:  Headaches x2-3 weeks    HPI:  Lachelle Alanis is a 67 y.o. female who presents for a 9 month post-operative follow-up.  She is s/p C5-6, C6-7 TDR that was done on 12/27/22 by Dr. Melissa. She was last seen in clinic on 3/29/2023 reporting occasional right upper arm pain which was improved following surgical intervention. She reported occasional difficulty with swallowing but reported this was a chronic issue prior to surgical intervention.     The patient returns today reporting the symptoms that are better since surgery.  The patient is complaining of pain in the occipital region radiating into the back of her head.  The patient describes these some as similar to prior to surgical intervention although higher up in her neck now.  She does feel her current symptoms are much less intense compared to what she experienced prior to surgical intervention as well.  She describes headaches from the back of her neck radiating into the back of her head.  She feels pain worse when looking up as well as when lying flat on her back.  She describes frequent dizziness with sitting, lying down as well as once while at the grocery store.  The patient states she feels as though the room is spinning around her.  She is denying numbness weakness or radicular upper extremity symptoms at this time.  She does  struggle with chronic lower back pain.  She states when sitting upright in her bed her left leg will go numb all the way down to the toes.  She reports prior to surgical intervention for her previous cervical symptoms she had attempted chiropractic care for both her neck and her back which did provided temporary relief.  She denies any new onset of weakness in the lower extremities.  The patient rates the pain 2/10 on the pain scale today. The patient denies disturbances in bowel or  bladder function.  There is no difficulty with balance.     Past Medical History:   Diagnosis Date    Anemia     Anxiety     Arm pain, right     Cervical spondylosis with radiculopathy     Depression     Diabetes     Dupuytren's contracture     GERD (gastroesophageal reflux disease)     Headache     Heart palpitations     Hyperlipidemia     Irregular heart beat     Postoperative hypothyroidism     Shoulder pain     Sleep apnea     does not use CPAP    Unspecified osteoarthritis, unspecified site        Past Surgical History:   Procedure Laterality Date    ABDOMINAL SURGERY      AUGMENTATION OF BREAST      BLADDER SUSPENSION      BUNIONECTOMY Right     CARPAL TUNNEL RELEASE Bilateral     COLONOSCOPY      DECOMPRESSION OF ULNAR NERVE      ESOPHAGOGASTRODUODENOSCOPY      HIP SURGERY Right     HYSTERECTOMY      vaginal; partial    THYROIDECTOMY      due to goiter, Hoshimoto's disease    TONSILLECTOMY      TOTAL REPLACEMENT OF CERVICAL INTERVERTEBRAL DISC N/A 12/27/2022    Procedure: REPLACEMENT, INTERVERTEBRAL DISC, CERVICAL, TOTAL;  Surgeon: Daniel Melissa MD;  Location: [facility];  Service: Neurosurgery;  Laterality: N/A;  C5-6, C6-7 TOTAL DISC REPLACEMENT, ACDF IF NECESSARY  NTI //  JAY    TRIGGER FINGER RELEASE      left 3rd finger       Family History   Problem Relation Age of Onset    Diabetes Mellitus Mother     Heart disease Mother     Hyperlipidemia Mother     Cancer Mother     Diabetes Mellitus Father     Heart disease Father     Hypertension Father        Social History     Socioeconomic History    Marital status:    Tobacco Use    Smoking status: Former     Current packs/day: 0.50     Average packs/day: 0.5 packs/day for 30.0 years (15.0 ttl pk-yrs)     Types: Cigarettes    Smokeless tobacco: Never   Substance and Sexual Activity    Alcohol use: Not Currently    Drug use: Not Currently     Social Determinants of Health     Financial Resource Strain: Low Risk  (12/28/2022)    Overall Financial Resource  Strain (CARDIA)     Difficulty of Paying Living Expenses: Not hard at all   Food Insecurity: Unknown (12/28/2022)    Hunger Vital Sign     Ran Out of Food in the Last Year: Never true   Transportation Needs: No Transportation Needs (12/28/2022)    PRAPARE - Transportation     Lack of Transportation (Medical): No     Lack of Transportation (Non-Medical): No   Housing Stability: Unknown (12/28/2022)    Housing Stability Vital Sign     Unable to Pay for Housing in the Last Year: No     Unstable Housing in the Last Year: No       Review of patient's allergies indicates:  No Known Allergies     Current Outpatient Medications   Medication Instructions    cholecalciferol (vitamin D3) 5,000 Units, Oral, Daily    co-enzyme Q-10 200 mg, Oral, Daily    cyanocobalamin (VITAMIN B-12) 1,000 mcg, Oral, Every Mon, Wed, Fri    diltiaZEM (CARDIZEM CD) 180 MG 24 hr capsule Oral    fenofibrate (TRICOR) 54 mg, Oral, Daily    flecainide (TAMBOCOR) 50 mg, Oral, 2 times daily    glimepiride (AMARYL) 4 mg, Oral, With breakfast    glucosamine/chondroitin/C/Farzad (GLUCOSAMINE 1500 COMPLEX ORAL) 1 tablet, Oral, 2 times daily    levothyroxine (SYNTHROID) 100 mcg, Oral, Every morning    metoprolol succinate (TOPROL-XL) 37.5 mg, Oral, 2 times daily    niacin 1,000 mg, Oral, Nightly    omega-3 acid ethyl esters (LOVAZA) 1 gram capsule 2 capsules, Oral, 2 times daily    pantoprazole (PROTONIX) 20 mg, Oral, Daily    rosuvastatin (CRESTOR) 10 mg, Oral, Daily    sertraline (ZOLOFT) 100 mg, Oral, Daily          Review of Systems   Constitutional:  Negative for chills, fever and weight loss.   HENT:  Negative for congestion, hearing loss, nosebleeds and tinnitus.    Eyes:  Negative for blurred vision, double vision and photophobia.   Respiratory:  Negative for cough, shortness of breath and wheezing.    Cardiovascular:  Negative for chest pain, palpitations and leg swelling.   Gastrointestinal:  Negative for constipation, diarrhea, nausea and vomiting.    Genitourinary:  Negative for dysuria, frequency and urgency.   Musculoskeletal:  Positive for neck pain. Negative for back pain and falls.   Skin:  Negative for itching and rash.   Neurological:  Positive for dizziness and headaches. Negative for tingling, tremors, sensory change, speech change, seizures and loss of consciousness.   Psychiatric/Behavioral:  Negative for depression, hallucinations and memory loss. The patient is not nervous/anxious.          OBJECTIVE:     Physical Exam    Visit Vitals  /70   Pulse 63   Resp 16   Ht 5' (1.524 m)   Wt 66.6 kg (146 lb 12.8 oz)   LMP  (LMP Unknown)   BMI 28.67 kg/m²        General:  Pleasant, Well-nourished, Well-groomed.    Cardiovascular:  Heart has regular rate and rhythm.    Lungs:  Breathing is quiet, non-lablored    Musculoskeletal:  Incision:  Well healed.  ROM is Full with the exception of mild limitation with cervical extension    Neurological:     Muscle strength against resistance:   Right Left   Deltoid (C5) 5/5 5/5   Biceps (C5/6) 5/5 5/5   Brachioradialis (C5/6) 5/5 5/5   Triceps (C7) 5/5 5/5   Wrist extension (C7) 5/5 5/5   Finger abduction (C8) 5/5 5/5    5/5 5/5        Hip abduction 5/5 5/5   Hip adduction 5/5 5/5   Knee extension (L3) 5/5 5/5   Knee flexion (L4) 5/5 5/5   Dorsiflexion (L5) 5/5 5/5   EHL (L5) 5/5 5/5   Plantar flexion (S1) 5/5 5/5   Sensation is intact to primary modalities in bilateral upper and lower extremities.    Reflexes:   Right Left   Triceps (C7) 2+ 2+   Biceps (C5) 2+ 2+   Brachioradialis (C6) 2+ 2+   Patellar (L4) 2+ 2+   Achilles (S1) 2+ 2+   Negative Babinski, Clonus, Parra, Tinel's, and Phalen's bilaterally.  Gait is normal  Coordination is normal.    Imaging:  AP, lateral, extension and flexion views of the cervical spine dated 8/28/2023 reveal stable disc arthroplasties at C5-6 and C6-7 without evidence of hardware failure.  Slight anterior listhesis of C4 on C5 with minimal motion on extension and  flexion.    ASSESSMENT:     No diagnosis found.    PLAN:     1. Tension headache    2. Vertigo    3. Lumbar radiculopathy    Lachelle Alanis returns today describing some tension into her neck as well as frequent headaches.  At this time I recommend an over the door cervical traction device given the lack of myelopathic symptoms on examination as well as stable x-ray findings today.  She was advised to utilize this device for 10-15 minutes twice daily.  She was also provided some cervical and lumbar home exercises to participate in 3-4 days a week.  Regarding her dizziness I do feel she is likely struggling with some vertigo.  She was advised to reach out to her primary care provider regarding medications as well as possible physical therapy.  She verbalized understanding today.  We will plan to follow up with the patient towards the end of October as she plans to travel to [facility] to visit her son for her birthday.  Should she be doing well at that time she was advised she could cancel this appointment.  Should she have any new or worsening symptoms prior to this visit she was advised to notify our office.    Follow up in about 9 weeks (around 10/30/2023).    E/M Level Based On Time:   15 minutes spent on reviewing chart, which includes interpreting lab results and diagnostic tests.   30 minutes spent in the room with the patient performing a history and physical exam, counseling or educating the patient/caregiver, prescribing medications, ordering labwork/diagnostic tests, or placing referrals.   5 minutes spent collaborating plan of care with physician.   5 minutes spent documenting all relevant clinical informationin the electronic health record.     Total Time Spent: 55 minutes       NORBERTO Beltrán    Disclaimer:  This note is prepared using voice recognition software and as such is likely to have errors despite attempts at proofreading. Please contact me for questions.

## 2023-10-23 ENCOUNTER — TELEPHONE (OUTPATIENT)
Dept: NEUROSURGERY | Facility: CLINIC | Age: 68
End: 2023-10-23
Payer: MEDICARE

## 2023-11-07 NOTE — PROGRESS NOTES
[facility]  History & Physical  Neurosurgery      Lachelle Alanis   [id number]   1955     SUBJECTIVE:     CHIEF COMPLAINT:  Headaches x2-3 weeks    HPI:  Lachelle Alanis is a 68 y.o. female who presents for a 9 month post-operative follow-up.  She is s/p C5-6, C6-7 TDR that was done on 12/27/22 by Dr. Melissa. She was last seen in clinic on 3/29/2023 reporting occasional right upper arm pain which was improved following surgical intervention. She reported occasional difficulty with swallowing but reported this was a chronic issue prior to surgical intervention.     Patient was last seen in clinic on 8/28/2023 describing occipital pain radiating into the back of the head.  She also described upper neck pain with frequent headaches.  She felt the symptoms were worse when looking up as well as when lying flat in her bed.  She describes frequent dizziness with sitting, lying down as well as when grocery shopping.  Home cervical traction device was recommended at that time as well as discussion with her primary care provider regarding possible vertigo evaluation.    The patient returns to our clinic today reporting improvement of her cervical neck pain.  She states she did not obtain the home cervical traction device as previously recommended as she read bad reviews on [employer].  She states this past Friday she slipped on some gravel which has caused a muscular strain to her left buttock and left groin.  This has begun to improve with ice heat and Tylenol.  She is denying any changes in bowel or bladder function.  She is denying any difficulty with balance.The patient denies vision changes, memory loss, difficulties with speech or swallowing, difficulties with sleep or hearing changes.    Past Medical History:   Diagnosis Date    Anemia     Anxiety     Arm pain, right     Cervical spondylosis with radiculopathy     Depression     Diabetes     Dupuytren's contracture     GERD (gastroesophageal reflux  disease)     Headache     Heart palpitations     Hyperlipidemia     Irregular heart beat     Postoperative hypothyroidism     Shoulder pain     Sleep apnea     does not use CPAP    Unspecified osteoarthritis, unspecified site        Past Surgical History:   Procedure Laterality Date    ABDOMINAL SURGERY      AUGMENTATION OF BREAST      BLADDER SUSPENSION      BUNIONECTOMY Right     CARPAL TUNNEL RELEASE Bilateral     COLONOSCOPY      DECOMPRESSION OF ULNAR NERVE      ESOPHAGOGASTRODUODENOSCOPY      HIP SURGERY Right     HYSTERECTOMY      vaginal; partial    THYROIDECTOMY      due to goiter, Hoshimoto's disease    TONSILLECTOMY      TOTAL REPLACEMENT OF CERVICAL INTERVERTEBRAL DISC N/A 12/27/2022    Procedure: REPLACEMENT, INTERVERTEBRAL DISC, CERVICAL, TOTAL;  Surgeon: Daniel Melissa MD;  Location: [facility];  Service: Neurosurgery;  Laterality: N/A;  C5-6, C6-7 TOTAL DISC REPLACEMENT, ACDF IF NECESSARY  NTI //  JAY    TRIGGER FINGER RELEASE      left 3rd finger       Family History   Problem Relation Age of Onset    Diabetes Mellitus Mother     Heart disease Mother     Hyperlipidemia Mother     Cancer Mother     Diabetes Mellitus Father     Heart disease Father     Hypertension Father        Social History     Socioeconomic History    Marital status:    Tobacco Use    Smoking status: Former     Current packs/day: 0.50     Average packs/day: 0.5 packs/day for 30.0 years (15.0 ttl pk-yrs)     Types: Cigarettes    Smokeless tobacco: Never   Substance and Sexual Activity    Alcohol use: Not Currently    Drug use: Not Currently     Social Determinants of Health     Financial Resource Strain: Low Risk  (12/28/2022)    Overall Financial Resource Strain (CARDIA)     Difficulty of Paying Living Expenses: Not hard at all   Food Insecurity: Unknown (12/28/2022)    Hunger Vital Sign     Ran Out of Food in the Last Year: Never true   Transportation Needs: No Transportation Needs (12/28/2022)    PRAPARE - Transportation      Lack of Transportation (Medical): No     Lack of Transportation (Non-Medical): No   Housing Stability: Unknown (12/28/2022)    Housing Stability Vital Sign     Unable to Pay for Housing in the Last Year: No     Unstable Housing in the Last Year: No       Review of patient's allergies indicates:  No Known Allergies     Current Outpatient Medications   Medication Instructions    cholecalciferol (vitamin D3) 5,000 Units, Oral, Daily    co-enzyme Q-10 200 mg, Oral, Daily    cyanocobalamin (VITAMIN B-12) 1,000 mcg, Oral, Every Mon, Wed, Fri    diltiaZEM (CARDIZEM CD) 180 MG 24 hr capsule Oral    fenofibrate (TRICOR) 54 mg, Oral, Daily    flecainide (TAMBOCOR) 50 mg, Oral, 2 times daily    glimepiride (AMARYL) 4 mg, Oral, With breakfast    glucosamine/chondroitin/C/Farzad (GLUCOSAMINE 1500 COMPLEX ORAL) 1 tablet, Oral, 2 times daily    levothyroxine (SYNTHROID) 112 mcg, Oral, Every morning    metoprolol succinate (TOPROL-XL) 37.5 mg, Oral, 2 times daily    niacin 1,000 mg, Oral, Nightly    omega-3 acid ethyl esters (LOVAZA) 1 gram capsule 2 capsules, Oral, 2 times daily    pantoprazole (PROTONIX) 20 mg, Oral, Daily    rosuvastatin (CRESTOR) 10 mg, Oral, Daily    sertraline (ZOLOFT) 100 mg, Oral, Daily          Review of Systems   Constitutional:  Negative for chills, fever and weight loss.   HENT:  Negative for congestion, hearing loss, nosebleeds and tinnitus.    Eyes:  Negative for blurred vision, double vision and photophobia.   Respiratory:  Negative for cough, shortness of breath and wheezing.    Cardiovascular:  Negative for chest pain, palpitations and leg swelling.   Gastrointestinal:  Negative for constipation, diarrhea, nausea and vomiting.   Genitourinary:  Negative for dysuria, frequency and urgency.   Musculoskeletal:  Positive for neck pain. Negative for back pain and falls.   Skin:  Negative for itching and rash.   Neurological:  Positive for dizziness and headaches. Negative for tingling, tremors,  sensory change, speech change, seizures and loss of consciousness.   Psychiatric/Behavioral:  Negative for depression, hallucinations and memory loss. The patient is not nervous/anxious.          OBJECTIVE:     Physical Exam    Visit Vitals  /70 (BP Location: Left arm, Patient Position: Sitting)   Pulse (!) 59   Resp 16   Ht 5' (1.524 m)   Wt 66.2 kg (146 lb)   LMP  (LMP Unknown)   BMI 28.51 kg/m²        General:  Pleasant, Well-nourished, Well-groomed.    Cardiovascular:  Heart has regular rate and rhythm.    Lungs:  Breathing is quiet, non-lablored    Musculoskeletal:  Incision:  Well healed.  ROM is Full with the exception of mild limitation with cervical extension    Neurological:     Muscle strength against resistance:   Right Left   Deltoid (C5) 5/5 5/5   Biceps (C5/6) 5/5 5/5   Brachioradialis (C5/6) 5/5 5/5   Triceps (C7) 5/5 5/5   Wrist extension (C7) 5/5 5/5   Finger abduction (C8) 5/5 5/5    5/5 5/5        Hip abduction 5/5 5/5   Hip adduction 5/5 5/5   Knee extension (L3) 5/5 5/5   Knee flexion (L4) 5/5 5/5   Dorsiflexion (L5) 5/5 5/5   EHL (L5) 5/5 5/5   Plantar flexion (S1) 5/5 5/5   Sensation is intact to primary modalities in bilateral upper and lower extremities.    Reflexes:   Right Left   Triceps (C7) 2+ 2+   Biceps (C5) 2+ 2+   Brachioradialis (C6) 2+ 2+   Patellar (L4) 2+ 2+   Achilles (S1) 2+ 2+   Negative Babinski, Clonus, Parra, Tinel's, and Phalen's bilaterally.  Gait is normal  Coordination is normal.    Imaging:  AP, lateral, extension and flexion views of the cervical spine dated 8/28/2023 reveal stable disc arthroplasties at C5-6 and C6-7 without evidence of hardware failure.  Slight anterior listhesis of C4 on C5 with minimal motion on extension and flexion.    ASSESSMENT:       ICD-10-CM ICD-9-CM   1. Tension headache  G44.209 307.81       PLAN:   1. Tension headache    Lachelle Alanis returns today describing continued tension headaches and occipital pain.  At this time,  I again recommend an over the door cervical traction device versus a Carbajal home traction device given the lack of myelopathic symptoms on examination.  She was advised to utilize this device for 10-15 minutes twice daily.  She was also encouraged to continue on with home cervical exercises at least 3-4 days a week.  She is requesting a follow-up visit in 3 months for re-evaluation.  Should she be doing well at that time she was advised she could cancel this appointment.  Should she have any new or worsening symptoms prior to this visit she was advised to notify our office.    Follow up in about 3 months (around 2/8/2024).    E/M Level Based On Time:   15 minutes spent on reviewing chart, which includes interpreting lab results and diagnostic tests.   20 minutes spent in the room with the patient performing a history and physical exam, counseling or educating the patient/caregiver, prescribing medications, ordering labwork/diagnostic tests, or placing referrals.   5 minutes spent documenting all relevant clinical informationin the electronic health record.     Total Time Spent: 40 minutes               NORBERTO Beltrán    Disclaimer:  This note is prepared using voice recognition software and as such is likely to have errors despite attempts at proofreading. Please contact me for questions.

## 2023-11-08 ENCOUNTER — OFFICE VISIT (OUTPATIENT)
Dept: NEUROSURGERY | Facility: CLINIC | Age: 68
End: 2023-11-08
Payer: MEDICARE

## 2023-11-08 VITALS
RESPIRATION RATE: 16 BRPM | SYSTOLIC BLOOD PRESSURE: 118 MMHG | BODY MASS INDEX: 28.66 KG/M2 | WEIGHT: 146 LBS | HEIGHT: 60 IN | HEART RATE: 59 BPM | DIASTOLIC BLOOD PRESSURE: 70 MMHG

## 2023-11-08 DIAGNOSIS — G44.209 TENSION HEADACHE: Primary | ICD-10-CM

## 2023-11-08 PROCEDURE — 99215 OFFICE O/P EST HI 40 MIN: CPT | Mod: ,,, | Performed by: NURSE PRACTITIONER

## 2023-11-08 PROCEDURE — 99215 PR OFFICE/OUTPT VISIT, EST, LEVL V, 40-54 MIN: ICD-10-PCS | Mod: ,,, | Performed by: NURSE PRACTITIONER

## 2023-11-08 RX ORDER — LEVOTHYROXINE SODIUM 112 UG/1
112 TABLET ORAL EVERY MORNING
COMMUNITY
Start: 2023-10-19 | End: 2024-02-14

## 2023-11-13 ENCOUNTER — TELEPHONE (OUTPATIENT)
Dept: NEUROSURGERY | Facility: CLINIC | Age: 68
End: 2023-11-13
Payer: MEDICARE

## 2023-11-14 NOTE — TELEPHONE ENCOUNTER
"Based on my note from 3 weeks post op, the patient reported feeling "out of it" and hallucinating while on Tramadol and Tizanidine.  She had similar results when taking Percocet in the hospital.  I tried to call the patient back to discuss, but no answer.  I left a voicemail requesting a call back.  "

## 2024-01-03 ENCOUNTER — OFFICE VISIT (OUTPATIENT)
Dept: ORTHOPEDICS | Facility: CLINIC | Age: 69
End: 2024-01-03
Payer: MEDICARE

## 2024-01-03 ENCOUNTER — HOSPITAL ENCOUNTER (OUTPATIENT)
Dept: RADIOLOGY | Facility: CLINIC | Age: 69
Discharge: HOME | End: 2024-01-03
Attending: PHYSICIAN ASSISTANT
Payer: MEDICARE

## 2024-01-03 DIAGNOSIS — M16.12 PRIMARY OSTEOARTHRITIS OF LEFT HIP: ICD-10-CM

## 2024-01-03 DIAGNOSIS — M25.552 LEFT HIP PAIN: Primary | ICD-10-CM

## 2024-01-03 DIAGNOSIS — M25.552 LEFT HIP PAIN: ICD-10-CM

## 2024-01-03 PROCEDURE — 73502 X-RAY EXAM HIP UNI 2-3 VIEWS: CPT | Mod: LT,,, | Performed by: PHYSICIAN ASSISTANT

## 2024-01-03 PROCEDURE — 1160F RVW MEDS BY RX/DR IN RCRD: CPT | Mod: CPTII,,, | Performed by: PHYSICIAN ASSISTANT

## 2024-01-03 PROCEDURE — 99213 OFFICE O/P EST LOW 20 MIN: CPT | Mod: ,,, | Performed by: PHYSICIAN ASSISTANT

## 2024-01-03 PROCEDURE — 1159F MED LIST DOCD IN RCRD: CPT | Mod: CPTII,,, | Performed by: PHYSICIAN ASSISTANT

## 2024-01-03 RX ORDER — MELOXICAM 7.5 MG/1
7.5 TABLET ORAL DAILY
Qty: 30 TABLET | Refills: 1 | Status: SHIPPED | OUTPATIENT
Start: 2024-01-03

## 2024-01-03 NOTE — PROGRESS NOTES
Chief Complaint:   Chief Complaint   Patient presents with    Hip Pain     L hip pain ongoing for a while. States recently it has been getting worse. States her pain is towards her groin. Reports severe pain when walking or sitting for a while. Denies fall/injury. Has not been taking anything for pain.       History of present illness:    This is a 68 y.o. year old female who complains of left hip and groin pain.    Patient states he has been having some increasing pain in the groin in the left side.    She has had previous bursitis in the past but she was not sure which side it was on but she states it has not feel like her bursitis pain as it has not on the outer aspect of her hip.    She has no specific injury or trauma that she reports but she was having difficulty abducting her leg due to the pain presently.          Current Outpatient Medications   Medication Sig    cholecalciferol, vitamin D3, 125 mcg (5,000 unit) capsule Take 5,000 Units by mouth once daily.    co-enzyme Q-10 30 mg capsule Take 200 mg by mouth once daily.    cyanocobalamin (VITAMIN B-12) 1000 MCG tablet Take 1,000 mcg by mouth every Mon, Wed, Fri.    fenofibrate (TRICOR) 54 MG tablet Take 54 mg by mouth once daily.    flecainide (TAMBOCOR) 50 MG Tab Take 50 mg by mouth 2 (two) times daily.    glimepiride (AMARYL) 2 MG tablet Take 4 mg by mouth daily with breakfast.    glucosamine/chondroitin/C/Farzad (GLUCOSAMINE 1500 COMPLEX ORAL) Take 1 tablet by mouth 2 (two) times a day.    levothyroxine (SYNTHROID) 112 MCG tablet Take 112 mcg by mouth every morning.    metoprolol succinate (TOPROL-XL) 25 MG 24 hr tablet Take 37.5 mg by mouth 2 (two) times daily.    niacin 500 MG CpSR Take 1,000 mg by mouth every evening.    omega-3 acid ethyl esters (LOVAZA) 1 gram capsule Take 2 capsules by mouth 2 (two) times daily.    pantoprazole (PROTONIX) 20 MG tablet Take 20 mg by mouth once daily.    rosuvastatin (CRESTOR) 10 MG tablet Take 10 mg by mouth once  daily.    sertraline (ZOLOFT) 100 MG tablet Take 100 mg by mouth once daily.    diltiaZEM (CARDIZEM CD) 180 MG 24 hr capsule Take by mouth.    meloxicam (MOBIC) 7.5 MG tablet Take 1 tablet (7.5 mg total) by mouth once daily. Take with food     No current facility-administered medications for this visit.       Review of Systems:    Constitution:   Denies chills, fever, and sweats.  HENT:   Denies headaches or blurry vision.  Cardiovascular:  Denies chest pain or irregular heart beat.  Respiratory:   Denies cough or shortness of breath.  Gastrointestinal:  Denies abdominal pain, nausea, or vomiting.  Musculoskeletal:   Denies muscle cramps.  Neurological:   Denies dizziness or focal weakness.  Psychiatric/Behavior: Normal mental status.  Hematology/Lymph:  Denies bleeding problem or easy bruising/bleeding.  Skin:    Denies rash or suspicious lesions.    Examination:    Vital Signs:  There were no vitals filed for this visit.    There is no height or weight on file to calculate BMI.    Constitution:   Well-developed, well nourished patient in no acute distress.  Neurological:   Alert and oriented x 3 and cooperative to examination.     Psychiatric/Behavior: Normal mental status.  Respiratory:   No shortness of breath.  Eyes:    Extraoccular muscles intact  Skin:    No scars, rash or suspicious lesions.    Physical Exam:   Hip Exam:  Left  No obvious deformity.   Flexion to 120 degrees and internal and external rotation to 40 degrees.   Abduction to 45 degree and adduction to 30 degrees.   Positive RIGOBERTO test.   Positive Stinchfield test.   No flexion contracture.   Negative  greater trochanteric tenderness.   5/5 strength, normal skin appearance and palpable pulses distally. Sensibility normal.    Imaging: X-rays ordered and images interpreted today personally by me of left hip three views.    Patient does have some mild arthritic changes in the inferior aspect of her hip joint but otherwise the head is well centered  no signs of avascular necrosis.            Assessment: Left hip pain  -     X-Ray Hip 2 or 3 views Left (with Pelvis when performed); Future; Expected date: 01/03/2024    Primary osteoarthritis of left hip    Other orders  -     meloxicam (MOBIC) 7.5 MG tablet; Take 1 tablet (7.5 mg total) by mouth once daily. Take with food  Dispense: 30 tablet; Refill: 1         Plan:  We will try the patient on a course of anti-inflammatories the next 2 weeks   She will follow up with Dr. Garcia heart we will who she is seen for her other hip in the past and we will see how her pain is doing after a course of anti-inflammatories          DISCLAIMER: This note may have been dictated using voice recognition software and may contain grammatical errors.     NOTE: Consult report sent to referring provider via [employer] EMR.

## 2024-01-18 ENCOUNTER — OFFICE VISIT (OUTPATIENT)
Dept: ORTHOPEDICS | Facility: CLINIC | Age: 69
End: 2024-01-18
Payer: MEDICARE

## 2024-01-18 VITALS
BODY MASS INDEX: 28.66 KG/M2 | WEIGHT: 146 LBS | DIASTOLIC BLOOD PRESSURE: 71 MMHG | HEIGHT: 60 IN | SYSTOLIC BLOOD PRESSURE: 125 MMHG | HEART RATE: 58 BPM

## 2024-01-18 DIAGNOSIS — M16.12 PRIMARY OSTEOARTHRITIS OF LEFT HIP: Primary | ICD-10-CM

## 2024-01-18 PROCEDURE — 1159F MED LIST DOCD IN RCRD: CPT | Mod: CPTII,,, | Performed by: ORTHOPAEDIC SURGERY

## 2024-01-18 PROCEDURE — 3008F BODY MASS INDEX DOCD: CPT | Mod: CPTII,,, | Performed by: ORTHOPAEDIC SURGERY

## 2024-01-18 PROCEDURE — 3074F SYST BP LT 130 MM HG: CPT | Mod: CPTII,,, | Performed by: ORTHOPAEDIC SURGERY

## 2024-01-18 PROCEDURE — 3078F DIAST BP <80 MM HG: CPT | Mod: CPTII,,, | Performed by: ORTHOPAEDIC SURGERY

## 2024-01-18 PROCEDURE — 99213 OFFICE O/P EST LOW 20 MIN: CPT | Mod: ,,, | Performed by: ORTHOPAEDIC SURGERY

## 2024-01-18 NOTE — PROGRESS NOTES
Chief Complaint:   Chief Complaint   Patient presents with    Follow-up     F/u for LT hip, states mobic helps about 90% with relief, states still doing about the same overall, has no other complaints        History of present illness:  68-year-old female presents today for evaluation have left hip pain.  Patient is seen by 1 of the PAs recently.  Had significant left hip pain she was placed on anti-inflammatories.  It has improved significantly.  She has continued to have some discomfort to her left groin.  Worse with activity.  Improved by rest.    Past Medical History:   Diagnosis Date    Anemia     Anxiety     Arm pain, right     Cervical spondylosis with radiculopathy     Depression     Diabetes     Dupuytren's contracture     GERD (gastroesophageal reflux disease)     Headache     Heart palpitations     Hyperlipidemia     Irregular heart beat     Postoperative hypothyroidism     Shoulder pain     Sleep apnea     does not use CPAP    Unspecified osteoarthritis, unspecified site        Past Surgical History:   Procedure Laterality Date    ABDOMINAL SURGERY      ADENOIDECTOMY      Dont remember    AUGMENTATION OF BREAST      BLADDER SUSPENSION      BREAST SURGERY      Implants    BUNIONECTOMY Right     CARPAL TUNNEL RELEASE Bilateral     COLONOSCOPY      DECOMPRESSION OF ULNAR NERVE      ESOPHAGOGASTRODUODENOSCOPY      HIP SURGERY Right     HYSTERECTOMY      vaginal; partial    THYROIDECTOMY      due to goiter, Hoshimoto's disease    TONSILLECTOMY      TOTAL REPLACEMENT OF CERVICAL INTERVERTEBRAL DISC N/A 12/27/2022    Procedure: REPLACEMENT, INTERVERTEBRAL DISC, CERVICAL, TOTAL;  Surgeon: Daniel Melissa MD;  Location: [facility];  Service: Neurosurgery;  Laterality: N/A;  C5-6, C6-7 TOTAL DISC REPLACEMENT, ACDF IF NECESSARY  NTI //  JAY    TRIGGER FINGER RELEASE      left 3rd finger       Current Outpatient Medications   Medication Sig    cholecalciferol, vitamin D3, 125 mcg (5,000 unit) capsule Take 5,000 Units  by mouth once daily.    co-enzyme Q-10 30 mg capsule Take 200 mg by mouth once daily.    cyanocobalamin (VITAMIN B-12) 1000 MCG tablet Take 1,000 mcg by mouth every Mon, Wed, Fri.    fenofibrate (TRICOR) 54 MG tablet Take 54 mg by mouth once daily.    flecainide (TAMBOCOR) 50 MG Tab Take 50 mg by mouth 2 (two) times daily.    glimepiride (AMARYL) 2 MG tablet Take 4 mg by mouth daily with breakfast.    glucosamine/chondroitin/C/Farzad (GLUCOSAMINE 1500 COMPLEX ORAL) Take 1 tablet by mouth 2 (two) times a day.    levothyroxine (SYNTHROID) 112 MCG tablet Take 112 mcg by mouth every morning.    meloxicam (MOBIC) 7.5 MG tablet Take 1 tablet (7.5 mg total) by mouth once daily. Take with food    metoprolol succinate (TOPROL-XL) 25 MG 24 hr tablet Take 37.5 mg by mouth 2 (two) times daily.    niacin 500 MG CpSR Take 1,000 mg by mouth every evening.    omega-3 acid ethyl esters (LOVAZA) 1 gram capsule Take 2 capsules by mouth 2 (two) times daily.    pantoprazole (PROTONIX) 20 MG tablet Take 20 mg by mouth once daily.    rosuvastatin (CRESTOR) 10 MG tablet Take 10 mg by mouth once daily.    sertraline (ZOLOFT) 100 MG tablet Take 100 mg by mouth once daily.    diltiaZEM (CARDIZEM CD) 180 MG 24 hr capsule Take by mouth.     No current facility-administered medications for this visit.       Review of patient's allergies indicates:   Allergen Reactions    Adhesive tape-silicones        Family History   Problem Relation Age of Onset    Diabetes Mellitus Mother     Heart disease Mother     Hyperlipidemia Mother     Cancer Mother     Diabetes Mother     Diabetes Mellitus Father     Heart disease Father     Hypertension Father     Diabetes Father     Arthritis Brother     Diabetes Brother     Arthritis Brother     Diabetes Brother        Social History     Socioeconomic History    Marital status:    Tobacco Use    Smoking status: Former     Current packs/day: 0.50     Average packs/day: 0.5 packs/day for 30.0 years (15.0 ttl  pk-yrs)     Types: Cigarettes    Smokeless tobacco: Never   Substance and Sexual Activity    Alcohol use: Never    Drug use: Never    Sexual activity: Not Currently     Birth control/protection: Post-menopausal     Social Determinants of Health     Financial Resource Strain: Low Risk  (12/28/2022)    Overall Financial Resource Strain (CARDIA)     Difficulty of Paying Living Expenses: Not hard at all   Food Insecurity: Unknown (12/28/2022)    Hunger Vital Sign     Ran Out of Food in the Last Year: Never true   Transportation Needs: No Transportation Needs (12/28/2022)    PRAPARE - Transportation     Lack of Transportation (Medical): No     Lack of Transportation (Non-Medical): No   Housing Stability: Unknown (12/28/2022)    Housing Stability Vital Sign     Unable to Pay for Housing in the Last Year: No     Unstable Housing in the Last Year: No           Review of Systems:    Constitution: Negative for chills, fever, and sweats.  Negative for unexplained weight loss.    HENT:  Negative for headaches and blurry vision.    Cardiovascular:Negative for chest pain or irregular heart beat. Negative for hypertension.    Respiratory:  Negative for cough and shortness of breath.    Gastrointestinal: Negative for abdominal pain, heartburn, melena, nausea, and vomitting.    Genitourinary:  Negative bladder incontinence and dysuria.    Musculoskeletal:  See HPI    Neurological: Negative for numbness.    Psychiatric/Behavioral: Negative for depression.  The patient is not nervous/anxious.      Endocrine: Negative for polyuria    Hematologic/Lymphatic: Negative for bleeding problem.  Does not bruise/bleed easily.    Skin: Negative for poor would healing and rash      Physical Examination:    Vital Signs:    Vitals:    01/18/24 1444   BP: 125/71   Pulse: (!) 58       Body mass index is 28.51 kg/m².    General: No acute distress, alert and oriented, healthy appearing    HEENT: Head is atraumatic, mucous membranes are moist    Neck:  Supples, no JVD    Cardiovascular: Palpable dorsalis pedis and posterior tibial pulses, regular rate and rhythm to those pulses    Lungs: Breathing non-labored    Skin: no rashes appreciated    Neurologic: Can flex and extend knees, ankles, and toes. Sensation is grossly intact    Left hip:  Range of motion left hip with groin pain.  She has a positive Stinchfield.  Internal and external rotation recreate groin pain.    X-rays:  Three views reviewed.  Patient with significant medial joint space narrowing.     Assessment::  Left hip osteoarthritis    Plan:  Discussed all treatment with the patient.  Patient with end-stage osteoarthritis of the left hip.  She is done much better with anti-inflammatories.  We will plan to see her back in a month to see how she is doing.  Long term, hip arthroplasty would significantly improve her pain.    This note was created using [employer] voice recognition software that occasionally misinterpreted phrases or words.    Consult note is delivered via Epic messaging service.

## 2024-02-14 ENCOUNTER — OFFICE VISIT (OUTPATIENT)
Dept: NEUROSURGERY | Facility: CLINIC | Age: 69
End: 2024-02-14
Payer: MEDICARE

## 2024-02-14 VITALS
BODY MASS INDEX: 28.47 KG/M2 | DIASTOLIC BLOOD PRESSURE: 76 MMHG | HEART RATE: 56 BPM | SYSTOLIC BLOOD PRESSURE: 120 MMHG | HEIGHT: 60 IN | WEIGHT: 145 LBS | RESPIRATION RATE: 16 BRPM

## 2024-02-14 DIAGNOSIS — M47.22 CERVICAL SPONDYLOSIS WITH RADICULOPATHY: Primary | ICD-10-CM

## 2024-02-14 DIAGNOSIS — G44.209 TENSION HEADACHE: ICD-10-CM

## 2024-02-14 PROCEDURE — 1126F AMNT PAIN NOTED NONE PRSNT: CPT | Mod: CPTII,,, | Performed by: NEUROLOGICAL SURGERY

## 2024-02-14 PROCEDURE — 3074F SYST BP LT 130 MM HG: CPT | Mod: CPTII,,, | Performed by: NEUROLOGICAL SURGERY

## 2024-02-14 PROCEDURE — 99214 OFFICE O/P EST MOD 30 MIN: CPT | Mod: ,,, | Performed by: NEUROLOGICAL SURGERY

## 2024-02-14 PROCEDURE — 1101F PT FALLS ASSESS-DOCD LE1/YR: CPT | Mod: CPTII,,, | Performed by: NEUROLOGICAL SURGERY

## 2024-02-14 PROCEDURE — 3078F DIAST BP <80 MM HG: CPT | Mod: CPTII,,, | Performed by: NEUROLOGICAL SURGERY

## 2024-02-14 PROCEDURE — 3008F BODY MASS INDEX DOCD: CPT | Mod: CPTII,,, | Performed by: NEUROLOGICAL SURGERY

## 2024-02-14 PROCEDURE — 1159F MED LIST DOCD IN RCRD: CPT | Mod: CPTII,,, | Performed by: NEUROLOGICAL SURGERY

## 2024-02-14 PROCEDURE — 3288F FALL RISK ASSESSMENT DOCD: CPT | Mod: CPTII,,, | Performed by: NEUROLOGICAL SURGERY

## 2024-02-14 PROCEDURE — 1160F RVW MEDS BY RX/DR IN RCRD: CPT | Mod: CPTII,,, | Performed by: NEUROLOGICAL SURGERY

## 2024-02-14 RX ORDER — LEVOTHYROXINE SODIUM 125 UG/1
125 TABLET ORAL EVERY MORNING
COMMUNITY
Start: 2024-02-08

## 2024-02-14 RX ORDER — TOPIRAMATE 25 MG/1
TABLET ORAL
Qty: 60 TABLET | Refills: 1 | Status: SHIPPED | OUTPATIENT
Start: 2024-02-14

## 2024-02-14 RX ORDER — METHOCARBAMOL 500 MG/1
500-1500 TABLET, FILM COATED ORAL 3 TIMES DAILY PRN
Qty: 30 TABLET | Refills: 2 | Status: SHIPPED | OUTPATIENT
Start: 2024-02-14 | End: 2024-02-24

## 2024-02-14 NOTE — PROGRESS NOTES
[facility]  History & Physical  Neurosurgery        Lachelle Alanis   [id number]   1955       SUBJECTIVE:     CHIEF COMPLAINT:  neck pain and headaches     HPI:  Lachelle Alanis is a 68 y.o. female who presents for follow up for cervical complaints.  She continues to complain of neck pain and headaches in the posterior head.  She is s/p C5-6, C6-7 TDR that was done on 12/27/22.  She had improvement in neck pain and headaches for the first 6-8 months after surgery, then they returned.  The neck pain and headaches remain less severe than what she experienced prior to surgery.  However, she feels the symptoms have worsened since her last visit here in November.  She continues to report resolution of preoperative arm pain.  She does not have any numbness or weakness in the arms.  She has not been to physical therapy since the surgery, but has done stretching exercises on her own at home.  She did not get the home cervical traction device, as she read bad reviews on the website.        Review of patient's allergies indicates:   Allergen Reactions    Adhesive tape-silicones        Current Outpatient Medications:     cholecalciferol, vitamin D3, 125 mcg (5,000 unit) capsule, Take 5,000 Units by mouth once daily., Disp: , Rfl:     co-enzyme Q-10 30 mg capsule, Take 200 mg by mouth once daily., Disp: , Rfl:     cyanocobalamin (VITAMIN B-12) 1000 MCG tablet, Take 1,000 mcg by mouth every Mon, Wed, Fri., Disp: , Rfl:     fenofibrate (TRICOR) 54 MG tablet, Take 54 mg by mouth once daily., Disp: , Rfl:     flecainide (TAMBOCOR) 50 MG Tab, Take 50 mg by mouth 2 (two) times daily., Disp: , Rfl:     glimepiride (AMARYL) 2 MG tablet, Take 4 mg by mouth daily with breakfast., Disp: , Rfl:     glucosamine/chondroitin/C/Farzad (GLUCOSAMINE 1500 COMPLEX ORAL), Take 1 tablet by mouth 2 (two) times a day., Disp: , Rfl:     levothyroxine (SYNTHROID) 125 MCG tablet, Take 125 mcg by mouth every morning., Disp: , Rfl:      Social work: Messages to [facility] to see if they are able to accept patient, await response. UPDATE: Response from Lauren/Kandice Branham, need OT notes in order to determine acceptance. Charyl Law states patient will need psych evaluation to determine appropriateness. At this time patient is refusing psych eval.  OT called to request eval.     If patient doesn't participate with OT and/or refuses snf, plan will be 211 shelter information or dc to [facility] in [place]. Yani/SHERIF RN informed patient of above yesterday. meloxicam (MOBIC) 7.5 MG tablet, Take 1 tablet (7.5 mg total) by mouth once daily. Take with food, Disp: 30 tablet, Rfl: 1    metoprolol succinate (TOPROL-XL) 25 MG 24 hr tablet, Take 37.5 mg by mouth 2 (two) times daily., Disp: , Rfl:     niacin 500 MG CpSR, Take 1,000 mg by mouth every evening., Disp: , Rfl:     omega-3 acid ethyl esters (LOVAZA) 1 gram capsule, Take 2 capsules by mouth 2 (two) times daily., Disp: , Rfl:     pantoprazole (PROTONIX) 20 MG tablet, Take 20 mg by mouth once daily., Disp: , Rfl:     rosuvastatin (CRESTOR) 10 MG tablet, Take 10 mg by mouth once daily., Disp: , Rfl:     sertraline (ZOLOFT) 100 MG tablet, Take 100 mg by mouth once daily., Disp: , Rfl:     methocarbamoL (ROBAXIN) 500 MG Tab, Take 1-3 tablets (500-1,500 mg total) by mouth 3 (three) times daily as needed (muscle spasms)., Disp: 30 tablet, Rfl: 2    topiramate (TOPAMAX) 25 MG tablet, Take 1 tablet by mouth every evening x 7 days, then 1 tablet twice a day, Disp: 60 tablet, Rfl: 1   Past Medical History:   Diagnosis Date    Anemia     Anxiety     Arm pain, right     Cervical spondylosis with radiculopathy     Depression     Diabetes     Dupuytren's contracture     GERD (gastroesophageal reflux disease)     Headache     Heart palpitations     Hyperlipidemia     Irregular heart beat     Postoperative hypothyroidism     Shoulder pain     Sleep apnea     does not use CPAP    Unspecified osteoarthritis, unspecified site      Past Surgical History:   Procedure Laterality Date    ABDOMINAL SURGERY      ADENOIDECTOMY      Dont remember    AUGMENTATION OF BREAST      BLADDER SUSPENSION      BREAST SURGERY      Implants    BUNIONECTOMY Right     CARPAL TUNNEL RELEASE Bilateral     COLONOSCOPY      DECOMPRESSION OF ULNAR NERVE      ESOPHAGOGASTRODUODENOSCOPY      HIP SURGERY Right     HYSTERECTOMY      vaginal; partial    THYROIDECTOMY      due to goiter, Hoshimoto's disease    TONSILLECTOMY      TOTAL REPLACEMENT OF CERVICAL  INTERVERTEBRAL DISC N/A 12/27/2022    Procedure: REPLACEMENT, INTERVERTEBRAL DISC, CERVICAL, TOTAL;  Surgeon: Daniel Melissa MD;  Location: [facility];  Service: Neurosurgery;  Laterality: N/A;  C5-6, C6-7 TOTAL DISC REPLACEMENT, ACDF IF NECESSARY  NTI //  JAY    TRIGGER FINGER RELEASE      left 3rd finger     Family History   Problem Relation Age of Onset    Diabetes Mellitus Mother     Heart disease Mother     Hyperlipidemia Mother     Cancer Mother     Diabetes Mother     Diabetes Mellitus Father     Heart disease Father     Hypertension Father     Diabetes Father     Arthritis Brother     Diabetes Brother     Arthritis Brother     Diabetes Brother      Social History     Tobacco Use    Smoking status: Former     Current packs/day: 0.50     Average packs/day: 0.5 packs/day for 30.0 years (15.0 ttl pk-yrs)     Types: Cigarettes    Smokeless tobacco: Never   Substance Use Topics    Alcohol use: Never    Drug use: Never        Review of Systems:  Constitutional:  Negative for chills, fever and weight loss.   HENT:  Negative for congestion, hearing loss, nosebleeds and tinnitus.    Eyes:  Negative for blurred vision, double vision and photophobia.   Respiratory:  Negative for cough, shortness of breath and wheezing.    Cardiovascular:  Negative for chest pain, palpitations and leg swelling.   Gastrointestinal:  Negative for constipation, diarrhea, nausea and vomiting.   Genitourinary:  Negative for dysuria, frequency and urgency.   Musculoskeletal:  Positive for neck pain. Negative for back pain and falls.   Skin:  Negative for itching and rash.   Neurological:  Positive for dizziness and headaches. Negative for tingling, tremors, sensory change, speech change, seizures and loss of consciousness.   Psychiatric/Behavioral:  Negative for depression, hallucinations and memory loss. The patient is not nervous/anxious.            OBJECTIVE:     Vital Signs (Most Recent)  Pulse: (!) 56 (02/14/24 1408)  Resp: 16 (02/14/24  1408)  BP: 120/76 (02/14/24 1408)    Physical Exam:  General:  Pleasant. Well-nourished. Well-groomed.    Head:  Normocephalic, without obvious abnormality, atraumatic    Lungs:  Quiet, non-labored     Neurological:    Oriented to Person, Place, Time   Speech:  normal  Memory, cognition, and affect are appropriate.    Muscle strength against resistance:    Strength  Deltoids Triceps Biceps Wrist Extension [employer]ics Hand    Upper: R 5/5 5/5 5/5 5/5 5/5 5/5    L 5/5 5/5 5/5 5/5 5/5 5/5     Reflexes:   Right Left   Triceps 2+ 2+   Biceps 2+ 2+   Brachioradialis 2+ 2+   Patellar 2+ 2+   Achilles 2+ 2+     Sensation is intact in bilateral upper extremities to a pinprick.    Parra: negative (both)    Gait:  normal    Coordination:  Normal    Musculoskeletal:   Incision:  Well healed.  ROM is Full with the exception of mild limitation with cervical extension    No new imaging available for review    ASSESSMENT/PLAN:     1. Cervical spondylosis with radiculopathy  - methocarbamoL (ROBAXIN) 500 MG Tab; Take 1-3 tablets (500-1,500 mg total) by mouth 3 (three) times daily as needed (muscle spasms).  Dispense: 30 tablet; Refill: 2  - Ambulatory referral/consult to Physical/Occupational Therapy; Future  - X-Ray Cervical Spine 5 View W Flex Extxt; Future    2. Tension headache  - Ambulatory referral/consult to Neurology; Future  - methocarbamoL (ROBAXIN) 500 MG Tab; Take 1-3 tablets (500-1,500 mg total) by mouth 3 (three) times daily as needed (muscle spasms).  Dispense: 30 tablet; Refill: 2  - Ambulatory referral/consult to Physical/Occupational Therapy; Future  - topiramate (TOPAMAX) 25 MG tablet; Take 1 tablet by mouth every evening x 7 days, then 1 tablet twice a day  Dispense: 60 tablet; Refill: 1  - Follow up in 6 months w/ x-rays (August 2024)         IDr. Daniel, personally performed the services described in this documentation. All medical record entries made by the Danica romo RN, were at my  direction and in my presence.  I have reviewed the chart and agree that the record reflects my personal performance and is accurate and complete. Daniel Melissa MD.  2:14 PM 02/14/2024       Daniel Melissa MD FACS [facility]

## 2024-02-22 ENCOUNTER — OFFICE VISIT (OUTPATIENT)
Dept: ORTHOPEDICS | Facility: CLINIC | Age: 69
End: 2024-02-22
Payer: MEDICARE

## 2024-02-22 VITALS
HEART RATE: 63 BPM | BODY MASS INDEX: 28.47 KG/M2 | TEMPERATURE: 97 F | SYSTOLIC BLOOD PRESSURE: 115 MMHG | HEIGHT: 60 IN | WEIGHT: 145 LBS | DIASTOLIC BLOOD PRESSURE: 67 MMHG

## 2024-02-22 DIAGNOSIS — M16.12 PRIMARY OSTEOARTHRITIS OF LEFT HIP: Primary | ICD-10-CM

## 2024-02-22 PROCEDURE — 1159F MED LIST DOCD IN RCRD: CPT | Mod: CPTII,,, | Performed by: NURSE PRACTITIONER

## 2024-02-22 PROCEDURE — 1125F AMNT PAIN NOTED PAIN PRSNT: CPT | Mod: CPTII,,, | Performed by: NURSE PRACTITIONER

## 2024-02-22 PROCEDURE — 3288F FALL RISK ASSESSMENT DOCD: CPT | Mod: CPTII,,, | Performed by: NURSE PRACTITIONER

## 2024-02-22 PROCEDURE — 1101F PT FALLS ASSESS-DOCD LE1/YR: CPT | Mod: CPTII,,, | Performed by: NURSE PRACTITIONER

## 2024-02-22 PROCEDURE — 3074F SYST BP LT 130 MM HG: CPT | Mod: CPTII,,, | Performed by: NURSE PRACTITIONER

## 2024-02-22 PROCEDURE — 99213 OFFICE O/P EST LOW 20 MIN: CPT | Mod: ,,, | Performed by: NURSE PRACTITIONER

## 2024-02-22 PROCEDURE — 3008F BODY MASS INDEX DOCD: CPT | Mod: CPTII,,, | Performed by: NURSE PRACTITIONER

## 2024-02-22 PROCEDURE — 3078F DIAST BP <80 MM HG: CPT | Mod: CPTII,,, | Performed by: NURSE PRACTITIONER

## 2024-02-22 RX ORDER — MELOXICAM 15 MG/1
15 TABLET ORAL DAILY
Qty: 30 TABLET | Refills: 2 | Status: SHIPPED | OUTPATIENT
Start: 2024-02-22

## 2024-02-22 RX ORDER — LEVOTHYROXINE SODIUM 137 UG/1
137 TABLET ORAL
COMMUNITY
Start: 2024-02-21

## 2024-02-22 NOTE — PROGRESS NOTES
Chief Complaint:   Chief Complaint   Patient presents with    Left Hip - Follow-up     1mon. out Lt hip f/u patient states she is hurting. She did try the mobic with little relief the first few weeks and now its now helping.        History of present illness: Lachelle Alanis is a 68 y.o. female, presents to clinic today in regards to her left hip.  Patient does have a known history of osteoarthritis.  She has been taking anti-inflammatories.  She states the Mobic is working although not doing as well as it was prior.  She is on 7.5 mg right now.  She would like to increase his dosage at this point.  She has not ready for any type of surgical procedure.    Past Medical History:   Diagnosis Date    Anemia     Anxiety     Arm pain, right     Cervical spondylosis with radiculopathy     Depression     Diabetes     Dupuytren's contracture     GERD (gastroesophageal reflux disease)     Headache     Heart palpitations     Hyperlipidemia     Irregular heart beat     Postoperative hypothyroidism     Shoulder pain     Sleep apnea     does not use CPAP    Unspecified osteoarthritis, unspecified site        Past Surgical History:   Procedure Laterality Date    ABDOMINAL SURGERY      ADENOIDECTOMY      Dont remember    AUGMENTATION OF BREAST      BLADDER SUSPENSION      BREAST SURGERY      Implants    BUNIONECTOMY Right     CARPAL TUNNEL RELEASE Bilateral     COLONOSCOPY      DECOMPRESSION OF ULNAR NERVE      ESOPHAGOGASTRODUODENOSCOPY      HIP SURGERY Right     HYSTERECTOMY      vaginal; partial    THYROIDECTOMY      due to goiter, Hoshimoto's disease    TONSILLECTOMY      TOTAL REPLACEMENT OF CERVICAL INTERVERTEBRAL DISC N/A 12/27/2022    Procedure: REPLACEMENT, INTERVERTEBRAL DISC, CERVICAL, TOTAL;  Surgeon: Daniel Melissa MD;  Location: [facility];  Service: Neurosurgery;  Laterality: N/A;  C5-6, C6-7 TOTAL DISC REPLACEMENT, ACDF IF NECESSARY  NTI //  JAY    TRIGGER FINGER RELEASE      left 3rd finger       Current Outpatient  Medications   Medication Sig    cholecalciferol, vitamin D3, 125 mcg (5,000 unit) capsule Take 5,000 Units by mouth once daily.    co-enzyme Q-10 30 mg capsule Take 200 mg by mouth once daily.    cyanocobalamin (VITAMIN B-12) 1000 MCG tablet Take 1,000 mcg by mouth every Mon, Wed, Fri.    fenofibrate (TRICOR) 54 MG tablet Take 54 mg by mouth once daily.    flecainide (TAMBOCOR) 50 MG Tab Take 50 mg by mouth 2 (two) times daily.    glimepiride (AMARYL) 2 MG tablet Take 4 mg by mouth daily with breakfast.    glucosamine/chondroitin/C/Farzad (GLUCOSAMINE 1500 COMPLEX ORAL) Take 1 tablet by mouth 2 (two) times a day.    levothyroxine (SYNTHROID) 137 MCG Tab tablet Take 137 mcg by mouth.    meloxicam (MOBIC) 7.5 MG tablet Take 1 tablet (7.5 mg total) by mouth once daily. Take with food    methocarbamoL (ROBAXIN) 500 MG Tab Take 1-3 tablets (500-1,500 mg total) by mouth 3 (three) times daily as needed (muscle spasms).    metoprolol succinate (TOPROL-XL) 25 MG 24 hr tablet Take 37.5 mg by mouth 2 (two) times daily.    niacin 500 MG CpSR Take 1,000 mg by mouth every evening.    omega-3 acid ethyl esters (LOVAZA) 1 gram capsule Take 2 capsules by mouth 2 (two) times daily.    pantoprazole (PROTONIX) 20 MG tablet Take 20 mg by mouth once daily.    rosuvastatin (CRESTOR) 10 MG tablet Take 10 mg by mouth once daily.    sertraline (ZOLOFT) 100 MG tablet Take 100 mg by mouth once daily.    topiramate (TOPAMAX) 25 MG tablet Take 1 tablet by mouth every evening x 7 days, then 1 tablet twice a day    levothyroxine (SYNTHROID) 125 MCG tablet Take 125 mcg by mouth every morning.    meloxicam (MOBIC) 15 MG tablet Take 1 tablet (15 mg total) by mouth once daily.     No current facility-administered medications for this visit.       Review of patient's allergies indicates:   Allergen Reactions    Adhesive tape-silicones        Family History   Problem Relation Age of Onset    Diabetes Mellitus Mother     Heart disease Mother      Hyperlipidemia Mother     Cancer Mother     Diabetes Mother     Diabetes Mellitus Father     Heart disease Father     Hypertension Father     Diabetes Father     Arthritis Brother     Diabetes Brother     Arthritis Brother     Diabetes Brother        Social History     Socioeconomic History    Marital status:    Tobacco Use    Smoking status: Former     Current packs/day: 0.50     Average packs/day: 0.5 packs/day for 30.0 years (15.0 ttl pk-yrs)     Types: Cigarettes    Smokeless tobacco: Never   Substance and Sexual Activity    Alcohol use: Never    Drug use: Never    Sexual activity: Not Currently     Birth control/protection: Post-menopausal     Social Determinants of Health     Financial Resource Strain: Low Risk  (12/28/2022)    Overall Financial Resource Strain (CARDIA)     Difficulty of Paying Living Expenses: Not hard at all   Food Insecurity: Unknown (12/28/2022)    Hunger Vital Sign     Ran Out of Food in the Last Year: Never true   Transportation Needs: No Transportation Needs (12/28/2022)    PRAPARE - Transportation     Lack of Transportation (Medical): No     Lack of Transportation (Non-Medical): No   Housing Stability: Unknown (12/28/2022)    Housing Stability Vital Sign     Unable to Pay for Housing in the Last Year: No     Unstable Housing in the Last Year: No           Review of Systems:    Denies fevers, chills, chest pain, shortness of breath. Comprehensive review of systems performed and otherwise negative except as noted in HPI     Physical Examination:    General: awake and alert, no acute distress, healthy appearing  Head and Neck: Head atraumatic/normocephalic. Moist MM  CV: brisk cap refill  Lungs: non-labored breathing, w/o cough or SOB  Skin: no rashes present, warm to touch  Neuro: sensation grossly intact distally       Vital Signs:    Vitals:    02/22/24 1440   BP: 115/67   Pulse: 63   Temp: 97.2 °F (36.2 °C)       Body mass index is 28.32 kg/m².    Left hip:  Range of motion  left hip with groin pain.  She has a positive Stinchfield.  Internal and external rotation recreate groin pain       Assessment::  Primary osteoarthritis left hip    Plan:  Patient presents to clinic today in regards to her left hip.  We will increase her Mobic to help with this pain.  We discussed the did options going forward including an intra-articular injection versus surgery.  She has not ready for either these options at this point.  Patient was given the phone number to call if she continues to have pain in his wishing to proceed with either a cortisone injection or surgery.  Patient states understanding and agrees with plan of care.    This note was created using [employer] voice recognition software that occasionally misinterpreted phrases or words.    Consult note is delivered via Epic messaging service.

## 2024-06-04 ENCOUNTER — OFFICE VISIT (OUTPATIENT)
Dept: ORTHOPEDICS | Facility: CLINIC | Age: 69
End: 2024-06-04
Payer: MEDICARE

## 2024-06-04 VITALS
HEART RATE: 62 BPM | BODY MASS INDEX: 28.27 KG/M2 | HEIGHT: 60 IN | DIASTOLIC BLOOD PRESSURE: 69 MMHG | SYSTOLIC BLOOD PRESSURE: 110 MMHG | WEIGHT: 144 LBS

## 2024-06-04 DIAGNOSIS — M16.12 PRIMARY OSTEOARTHRITIS OF LEFT HIP: Primary | ICD-10-CM

## 2024-06-04 PROCEDURE — 3008F BODY MASS INDEX DOCD: CPT | Mod: CPTII,,, | Performed by: ORTHOPAEDIC SURGERY

## 2024-06-04 PROCEDURE — 1101F PT FALLS ASSESS-DOCD LE1/YR: CPT | Mod: CPTII,,, | Performed by: ORTHOPAEDIC SURGERY

## 2024-06-04 PROCEDURE — 1159F MED LIST DOCD IN RCRD: CPT | Mod: CPTII,,, | Performed by: ORTHOPAEDIC SURGERY

## 2024-06-04 PROCEDURE — 1125F AMNT PAIN NOTED PAIN PRSNT: CPT | Mod: CPTII,,, | Performed by: ORTHOPAEDIC SURGERY

## 2024-06-04 PROCEDURE — 3074F SYST BP LT 130 MM HG: CPT | Mod: CPTII,,, | Performed by: ORTHOPAEDIC SURGERY

## 2024-06-04 PROCEDURE — 3078F DIAST BP <80 MM HG: CPT | Mod: CPTII,,, | Performed by: ORTHOPAEDIC SURGERY

## 2024-06-04 PROCEDURE — 99214 OFFICE O/P EST MOD 30 MIN: CPT | Mod: ,,, | Performed by: ORTHOPAEDIC SURGERY

## 2024-06-04 PROCEDURE — 3288F FALL RISK ASSESSMENT DOCD: CPT | Mod: CPTII,,, | Performed by: ORTHOPAEDIC SURGERY

## 2024-06-04 RX ORDER — VITAMIN E 268 MG
400 CAPSULE ORAL DAILY
COMMUNITY

## 2024-06-04 RX ORDER — NAPROXEN SODIUM 220 MG/1
81 TABLET, FILM COATED ORAL DAILY
COMMUNITY

## 2024-06-04 RX ORDER — IBUPROFEN 100 MG/5ML
1000 SUSPENSION, ORAL (FINAL DOSE FORM) ORAL DAILY
COMMUNITY

## 2024-06-04 RX ORDER — LANOLIN ALCOHOL/MO/W.PET/CERES
400 CREAM (GRAM) TOPICAL DAILY
COMMUNITY

## 2024-06-04 NOTE — PROGRESS NOTES
Chief Complaint:   Chief Complaint   Patient presents with    Left Hip - Follow-up     Pt states she was doing really good while taking Mobic, but due to severe stomach pain, she had to discontinue the medication. Pt states she has been experiencing pain every day that goes up to 9/10 on bad days. Pt states if she sits for too long or stands for long periods of time it aggravates the hip. Pt states she is ready to discuss surgery with our specialist.        History of present illness:    History of Present Illness  The patient presents for evaluation of left hip pain.    The patient reports experiencing severe pain in her left hip and groin. She has not received any injections in her left hip. She experiences difficulty in donning shoes and socks and rising from a seated position, which significantly exacerbates her discomfort. Her right hip, however, exhibits mild discomfort, albeit less severe than her left hip. She is unable to take meloxicam due to gastrointestinal discomfort. The pain, which disrupts her sleep, is severe enough to impede her ability to walk after prolonged sitting. She is a side sleeper and finds lying on her back to be challenging.    Past Medical History:   Diagnosis Date    Anemia     Anxiety     Arm pain, right     Cervical spondylosis with radiculopathy     Depression     Diabetes     Dupuytren's contracture     GERD (gastroesophageal reflux disease)     Headache     Heart palpitations     Hyperlipidemia     Irregular heart beat     Postoperative hypothyroidism     Shoulder pain     Sleep apnea     does not use CPAP    Unspecified osteoarthritis, unspecified site        Past Surgical History:   Procedure Laterality Date    ABDOMINAL SURGERY      ADENOIDECTOMY      Dont remember    AUGMENTATION OF BREAST      BLADDER SUSPENSION      BREAST SURGERY      Implants    BUNIONECTOMY Right     CARPAL TUNNEL RELEASE Bilateral     COLONOSCOPY      DECOMPRESSION OF ULNAR NERVE       ESOPHAGOGASTRODUODENOSCOPY      HIP SURGERY Right     HYSTERECTOMY      vaginal; partial    THYROIDECTOMY      due to goiter, Hoshimoto's disease    TONSILLECTOMY      TOTAL REPLACEMENT OF CERVICAL INTERVERTEBRAL DISC N/A 12/27/2022    Procedure: REPLACEMENT, INTERVERTEBRAL DISC, CERVICAL, TOTAL;  Surgeon: Daniel Melissa MD;  Location: [facility];  Service: Neurosurgery;  Laterality: N/A;  C5-6, C6-7 TOTAL DISC REPLACEMENT, ACDF IF NECESSARY  NTI //  JAY    TRIGGER FINGER RELEASE      left 3rd finger       Current Outpatient Medications   Medication Sig    ascorbic acid, vitamin C, (VITAMIN C) 1000 MG tablet Take 1,000 mg by mouth once daily.    aspirin 81 MG Chew Take 81 mg by mouth once daily.    cholecalciferol, vitamin D3, 125 mcg (5,000 unit) capsule Take 5,000 Units by mouth once daily.    co-enzyme Q-10 30 mg capsule Take 200 mg by mouth once daily.    cyanocobalamin (VITAMIN B-12) 1000 MCG tablet Take 1,000 mcg by mouth every Mon, Wed, Fri.    fenofibrate (TRICOR) 54 MG tablet Take 54 mg by mouth once daily.    flecainide (TAMBOCOR) 50 MG Tab Take 50 mg by mouth 2 (two) times daily.    glimepiride (AMARYL) 2 MG tablet Take 4 mg by mouth daily with breakfast.    glucosamine/chondroitin/C/Farzad (GLUCOSAMINE 1500 COMPLEX ORAL) Take 1 tablet by mouth 2 (two) times a day.    levothyroxine (SYNTHROID) 137 MCG Tab tablet Take 137 mcg by mouth.    magnesium oxide (MAG-OX) 400 mg (241.3 mg magnesium) tablet Take 400 mg by mouth once daily.    metoprolol succinate (TOPROL-XL) 25 MG 24 hr tablet Take 37.5 mg by mouth 2 (two) times daily.    niacin 500 MG CpSR Take 1,000 mg by mouth every evening.    omega-3 acid ethyl esters (LOVAZA) 1 gram capsule Take 2 capsules by mouth 2 (two) times daily.    pantoprazole (PROTONIX) 20 MG tablet Take 20 mg by mouth once daily.    rosuvastatin (CRESTOR) 10 MG tablet Take 10 mg by mouth once daily.    sertraline (ZOLOFT) 100 MG tablet Take 100 mg by mouth once daily.    topiramate  (TOPAMAX) 25 MG tablet Take 1 tablet by mouth every evening x 7 days, then 1 tablet twice a day    vitamin E 400 UNIT capsule Take 400 Units by mouth once daily.    zinc sulfate (ZINC-15 ORAL) Take 50 mg by mouth once.    levothyroxine (SYNTHROID) 125 MCG tablet Take 125 mcg by mouth every morning.    meloxicam (MOBIC) 15 MG tablet Take 1 tablet (15 mg total) by mouth once daily. (Patient not taking: Reported on 6/4/2024)    meloxicam (MOBIC) 7.5 MG tablet Take 1 tablet (7.5 mg total) by mouth once daily. Take with food (Patient not taking: Reported on 6/4/2024)     No current facility-administered medications for this visit.       Review of patient's allergies indicates:   Allergen Reactions    Adhesive tape-silicones        Family History   Problem Relation Name Age of Onset    Diabetes Mellitus Mother Angelina     Heart disease Mother [place]     Hyperlipidemia Mother Angelina     Cancer Mother Angelina     Diabetes Mother Angelina     Diabetes Mellitus Father Oswaldo Anderson     Heart disease Father Oswaldo Anderson     Hypertension Father Oswaldo Anderson     Diabetes Father Oswaldo Anderson     Arthritis Brother Kamlesh     Diabetes Brother Kamlesh     Arthritis Brother Daniel     Diabetes Brother Fran        Social History     Socioeconomic History    Marital status:    Tobacco Use    Smoking status: Former     Current packs/day: 0.50     Average packs/day: 0.5 packs/day for 30.0 years (15.0 ttl pk-yrs)     Types: Cigarettes    Smokeless tobacco: Never   Substance and Sexual Activity    Alcohol use: Never    Drug use: Never    Sexual activity: Not Currently     Birth control/protection: Post-menopausal     Social Determinants of Health     Financial Resource Strain: Low Risk  (12/28/2022)    Overall Financial Resource Strain (CARDIA)     Difficulty of Paying Living Expenses: Not hard at all   Food Insecurity: Unknown (12/28/2022)    Hunger Vital Sign     Ran Out of Food in the Last Year: Never true   Transportation Needs: No Transportation  Needs (12/28/2022)    PRAPARE - Transportation     Lack of Transportation (Medical): No     Lack of Transportation (Non-Medical): No   Housing Stability: Unknown (12/28/2022)    Housing Stability Vital Sign     Unable to Pay for Housing in the Last Year: No     Unstable Housing in the Last Year: No           Review of Systems:    Constitution: Negative for chills, fever, and sweats.  Negative for unexplained weight loss.    HENT:  Negative for headaches and blurry vision.    Cardiovascular:Negative for chest pain or irregular heart beat. Negative for hypertension.    Respiratory:  Negative for cough and shortness of breath.    Gastrointestinal: Negative for abdominal pain, heartburn, melena, nausea, and vomitting.    Genitourinary:  Negative bladder incontinence and dysuria.    Musculoskeletal:  See HPI    Neurological: Negative for numbness.    Psychiatric/Behavioral: Negative for depression.  The patient is not nervous/anxious.      Endocrine: Negative for polyuria    Hematologic/Lymphatic: Negative for bleeding problem.  Does not bruise/bleed easily.    Skin: Negative for poor would healing and rash      Physical Examination:    Vital Signs:    Vitals:    06/04/24 1407   BP: 110/69   Pulse: 62       Body mass index is 28.12 kg/m².    General: No acute distress, alert and oriented, healthy appearing    HEENT: Head is atraumatic, mucous membranes are moist    Neck: Supples, no JVD    Cardiovascular: Palpable dorsalis pedis and posterior tibial pulses, regular rate and rhythm to those pulses    Lungs: Breathing non-labored    Skin: no rashes appreciated    Neurologic: Can flex and extend knees, ankles, and toes. Sensation is grossly intact    Left hip:  Range of motion of the left hip with significant pain discomfort.  Patient has pain with internal and external rotation.  Brisk cap refill distally.  Positive Stinchfield with recreation of her groin pain.    X-rays:  Three views of the left hip reviewed with the  patient with significant central joint space narrowing.  She has Kellgren Kaveh grade 3 change of the left hip.     Assessment::  Left hip osteoarthritis    Plan:  At this point the patient is tried and failed all conservative management with regards to their hip. They have tried and failed nonoperative management including: Anti-inflammatories, activity modification, the use of a cane. All of these have failed to completely remove their pain. They have pain going up and down stairs as well as walking on level ground. The hip pain is affecting activities of daily living They feel that theyve reached a point of disability with regards to their hip. X-rays reveal advanced, end-stage degenerative osteoarthritis as noted by subchondral sclerosis, joint space narrowing, and periarticular osteophytes. The patient would like to proceed with surgical intervention and would be a good candidate for total hip replacement.    Total hip arthroplasty procedure, alternatives, risks, and benefits were discussed in detail. The risks including but not limited to: infection, need for revision surgery, pain, swelling, loosening, injury to surrounding neurovascular structures, stiffness, incomplete resolution of pain, DVT, PE, and death were discussed in detail. Despite these risks, the patient would like to proceed with surgical intervention. All questions were answered, no guarantees made. Will plan for left [facility] on July/August.      This note was generated with the assistance of ambient listening technology. Verbal consent was obtained by the patient and accompanying visitor(s) for the recording of patient appointment to facilitate this note. I attest to having reviewed and edited the generated note for accuracy, though some syntax or spelling errors may persist. Please contact the author of this note for any clarification.      This note was created using [employer] voice recognition software that occasionally misinterpreted phrases  or words.    Consult note is delivered via Epic messaging service.

## 2024-06-17 ENCOUNTER — TELEPHONE (OUTPATIENT)
Dept: ORTHOPEDICS | Facility: CLINIC | Age: 69
End: 2024-06-17
Payer: MEDICARE

## 2024-06-17 NOTE — TELEPHONE ENCOUNTER
Patient left a  requesting a rx for pain meds. She has previously done the mobic but her stomach couldn't tolerate it. I advised patient to take OTC anti- inflam and she said that because of the amount of pain she is in, it wont dont help.     How to proceed?

## 2024-06-18 NOTE — TELEPHONE ENCOUNTER
Spoke with patient to inform her that per provider unfortunately that is all the pain medication they can provide her with.     She is scheduled to come back on 06/27 to pre op. From there she can discuss other tx.     She also mentioned a cardiologist. She has not heard anything from Dr. Silva office and wanted to know if we had. Patient was informed that a clearance letter will be sent to his office.     Patient voiced understanding

## 2024-06-27 ENCOUNTER — ANESTHESIA EVENT (OUTPATIENT)
Dept: SURGERY | Facility: HOSPITAL | Age: 69
End: 2024-06-27
Payer: MEDICARE

## 2024-06-27 ENCOUNTER — HOSPITAL ENCOUNTER (OUTPATIENT)
Dept: RADIOLOGY | Facility: HOSPITAL | Age: 69
Discharge: HOME | End: 2024-06-27
Attending: NURSE PRACTITIONER
Payer: MEDICARE

## 2024-06-27 ENCOUNTER — OFFICE VISIT (OUTPATIENT)
Dept: ORTHOPEDICS | Facility: CLINIC | Age: 69
End: 2024-06-27
Payer: MEDICARE

## 2024-06-27 VITALS
HEART RATE: 59 BPM | WEIGHT: 140 LBS | DIASTOLIC BLOOD PRESSURE: 64 MMHG | SYSTOLIC BLOOD PRESSURE: 128 MMHG | HEIGHT: 60 IN | TEMPERATURE: 98 F | BODY MASS INDEX: 27.48 KG/M2

## 2024-06-27 DIAGNOSIS — Z01.818 PREOP TESTING: ICD-10-CM

## 2024-06-27 DIAGNOSIS — M16.12 PRIMARY OSTEOARTHRITIS OF LEFT HIP: Primary | ICD-10-CM

## 2024-06-27 DIAGNOSIS — M19.90 OSTEOARTHRITIS: ICD-10-CM

## 2024-06-27 DIAGNOSIS — M16.12 PRIMARY OSTEOARTHRITIS OF LEFT HIP: ICD-10-CM

## 2024-06-27 DIAGNOSIS — Z47.1 AFTERCARE FOLLOWING LEFT HIP JOINT REPLACEMENT SURGERY: ICD-10-CM

## 2024-06-27 DIAGNOSIS — Z96.642 AFTERCARE FOLLOWING LEFT HIP JOINT REPLACEMENT SURGERY: ICD-10-CM

## 2024-06-27 DIAGNOSIS — R79.9 ABNORMAL BLOOD CHEMISTRY LEVEL: ICD-10-CM

## 2024-06-27 LAB — MRSA PCR SCRN (OHS): DETECTED

## 2024-06-27 PROCEDURE — 73700 CT LOWER EXTREMITY W/O DYE: CPT | Mod: TC,LT

## 2024-06-27 PROCEDURE — 71046 X-RAY EXAM CHEST 2 VIEWS: CPT | Mod: TC

## 2024-06-27 RX ORDER — ASPIRIN 81 MG/1
81 TABLET ORAL 2 TIMES DAILY
Qty: 84 TABLET | Refills: 0 | Status: SHIPPED | OUTPATIENT
Start: 2024-06-27 | End: 2024-06-27 | Stop reason: CLARIF

## 2024-06-27 RX ORDER — DOCUSATE SODIUM 100 MG/1
100 CAPSULE, LIQUID FILLED ORAL 2 TIMES DAILY
Qty: 28 CAPSULE | Refills: 0 | Status: SHIPPED | OUTPATIENT
Start: 2024-06-27 | End: 2024-06-27 | Stop reason: CLARIF

## 2024-06-27 RX ORDER — ACETAMINOPHEN 500 MG
1000 TABLET ORAL
OUTPATIENT
Start: 2024-06-27

## 2024-06-27 RX ORDER — GABAPENTIN 100 MG/1
300 CAPSULE ORAL
OUTPATIENT
Start: 2024-06-27

## 2024-06-27 RX ORDER — SCOLOPAMINE TRANSDERMAL SYSTEM 1 MG/1
1 PATCH, EXTENDED RELEASE TRANSDERMAL ONCE AS NEEDED
OUTPATIENT
Start: 2024-06-27 | End: 2035-11-24

## 2024-06-27 RX ORDER — METHOCARBAMOL 750 MG/1
750 TABLET, FILM COATED ORAL 3 TIMES DAILY
Qty: 42 TABLET | Refills: 0 | Status: SHIPPED | OUTPATIENT
Start: 2024-06-27 | End: 2024-06-27 | Stop reason: CLARIF

## 2024-06-27 RX ORDER — CELECOXIB 200 MG/1
200 CAPSULE ORAL 2 TIMES DAILY
Qty: 60 CAPSULE | Refills: 0 | Status: SHIPPED | OUTPATIENT
Start: 2024-06-27 | End: 2024-06-27 | Stop reason: CLARIF

## 2024-06-27 RX ORDER — HYDROCODONE BITARTRATE AND ACETAMINOPHEN 7.5; 325 MG/1; MG/1
1 TABLET ORAL EVERY 6 HOURS PRN
Qty: 28 TABLET | Refills: 0 | Status: SHIPPED | OUTPATIENT
Start: 2024-06-27 | End: 2024-06-27 | Stop reason: CLARIF

## 2024-06-27 RX ORDER — ONDANSETRON 4 MG/1
4 TABLET, ORALLY DISINTEGRATING ORAL
OUTPATIENT
Start: 2024-06-27

## 2024-06-27 RX ORDER — KETOROLAC TROMETHAMINE 10 MG/1
10 TABLET, FILM COATED ORAL
OUTPATIENT
Start: 2024-06-27 | End: 2024-06-27

## 2024-06-27 RX ORDER — TRANEXAMIC ACID 650 MG/1
1950 TABLET ORAL
OUTPATIENT
Start: 2024-06-27 | End: 2024-06-27

## 2024-06-27 RX ORDER — SODIUM CHLORIDE, SODIUM GLUCONATE, SODIUM ACETATE, POTASSIUM CHLORIDE AND MAGNESIUM CHLORIDE 30; 37; 368; 526; 502 MG/100ML; MG/100ML; MG/100ML; MG/100ML; MG/100ML
INJECTION, SOLUTION INTRAVENOUS CONTINUOUS
OUTPATIENT
Start: 2024-06-27

## 2024-06-27 NOTE — H&P (VIEW-ONLY)
Chief Complaint:   Chief Complaint   Patient presents with    Hip Pain     Pre-op left KULWANT sx 7/8/24       History of present illness:    History of Present Illness  The patient presents for evaluation of left hip pain.    The patient reports experiencing severe pain in her left hip and groin. She has not received any injections in her left hip. She experiences difficulty in donning shoes and socks and rising from a seated position, which significantly exacerbates her discomfort. Her right hip, however, exhibits mild discomfort, albeit less severe than her left hip. She is unable to take meloxicam due to gastrointestinal discomfort. The pain, which disrupts her sleep, is severe enough to impede her ability to walk after prolonged sitting. She is a side sleeper and finds lying on her back to be challenging.  This has since the pain has affected her daily living activities.  She feels as though she has reached a point of disability would like to proceed with a left total hip arthroplasty.    Past Medical History:   Diagnosis Date    Anemia     Anxiety     Arm pain, right     Cervical spondylosis with radiculopathy     Depression     Diabetes     Dupuytren's contracture     GERD (gastroesophageal reflux disease)     Headache     Heart palpitations     Hyperlipidemia     Hypertension     Irregular heart beat     Postoperative hypothyroidism     Shoulder pain     Sleep apnea     does not use CPAP    Unspecified osteoarthritis, unspecified site        Past Surgical History:   Procedure Laterality Date    ABDOMINAL SURGERY      tummy tuck    ADENOIDECTOMY      Dont remember    AUGMENTATION OF BREAST      BLADDER SUSPENSION      BUNIONECTOMY Right     CARPAL TUNNEL RELEASE Bilateral     COLONOSCOPY      DECOMPRESSION OF ULNAR NERVE      ESOPHAGOGASTRODUODENOSCOPY      HIP SURGERY Right     HYSTERECTOMY      vaginal; partial    THYROIDECTOMY      due to goiter, Hoshimoto's disease    TONSILLECTOMY      TOTAL REPLACEMENT OF  CERVICAL INTERVERTEBRAL DISC N/A 12/27/2022    Procedure: REPLACEMENT, INTERVERTEBRAL DISC, CERVICAL, TOTAL;  Surgeon: Daniel Melissa MD;  Location: [facility];  Service: Neurosurgery;  Laterality: N/A;  C5-6, C6-7 TOTAL DISC REPLACEMENT, ACDF IF NECESSARY  NTI //  JAY    TRIGGER FINGER RELEASE      left 3rd finger       Current Outpatient Medications   Medication Sig    ascorbic acid, vitamin C, (VITAMIN C) 1000 MG tablet Take 1,000 mg by mouth once daily.    aspirin 81 MG Chew Take 81 mg by mouth once daily.    cholecalciferol, vitamin D3, 125 mcg (5,000 unit) capsule Take 5,000 Units by mouth once daily.    co-enzyme Q-10 30 mg capsule Take 200 mg by mouth once daily.    cyanocobalamin (VITAMIN B-12) 1000 MCG tablet Take 1,000 mcg by mouth every Mon, Wed, Fri.    fenofibrate (TRICOR) 54 MG tablet Take 54 mg by mouth once daily.    flecainide (TAMBOCOR) 50 MG Tab Take 50 mg by mouth 2 (two) times daily.    glimepiride (AMARYL) 2 MG tablet Take 4 mg by mouth daily with breakfast.    glucosamine/chondroitin/C/Farzad (GLUCOSAMINE 1500 COMPLEX ORAL) Take 1 tablet by mouth 2 (two) times a day.    levothyroxine (SYNTHROID) 125 MCG tablet Take 125 mcg by mouth every morning.    levothyroxine (SYNTHROID) 137 MCG Tab tablet Take 137 mcg by mouth before breakfast.    magnesium oxide (MAG-OX) 400 mg (241.3 mg magnesium) tablet Take 400 mg by mouth once daily.    meloxicam (MOBIC) 15 MG tablet Take 1 tablet (15 mg total) by mouth once daily. (Patient not taking: Reported on 6/4/2024)    meloxicam (MOBIC) 7.5 MG tablet Take 1 tablet (7.5 mg total) by mouth once daily. Take with food (Patient not taking: Reported on 6/4/2024)    metoprolol succinate (TOPROL-XL) 25 MG 24 hr tablet Take 37.5 mg by mouth 2 (two) times daily.    niacin 500 MG CpSR Take 1,000 mg by mouth every evening.    omega-3 acid ethyl esters (LOVAZA) 1 gram capsule Take 2 capsules by mouth 2 (two) times daily.    pantoprazole (PROTONIX) 20 MG tablet Take 20 mg by  mouth once daily.    rosuvastatin (CRESTOR) 10 MG tablet Take 10 mg by mouth once daily.    sertraline (ZOLOFT) 100 MG tablet Take 100 mg by mouth once daily.    topiramate (TOPAMAX) 25 MG tablet Take 1 tablet by mouth every evening x 7 days, then 1 tablet twice a day    vitamin E 400 UNIT capsule Take 400 Units by mouth once daily.    zinc sulfate (ZINC-15 ORAL) Take 50 mg by mouth once.     No current facility-administered medications for this visit.       Review of patient's allergies indicates:   Allergen Reactions    Adhesive tape-silicones        Family History   Problem Relation Name Age of Onset    Diabetes Mellitus Mother Angelina     Heart disease Mother Angelina     Hyperlipidemia Mother Angelina     Cancer Mother Angelina     Diabetes Mother Angelina     Diabetes Mellitus Father Oswaldo [place]     Heart disease Father Oswaldo Anderson     Hypertension Father Oswaldo Anderson     Diabetes Father Oswaldo Anderson     Arthritis Brother Kamlesh     Diabetes Brother Kamlesh     Arthritis Brother Daniel     Diabetes Brother Fran        Social History     Socioeconomic History    Marital status:    Tobacco Use    Smoking status: Former     Current packs/day: 0.50     Average packs/day: 0.5 packs/day for 30.0 years (15.0 ttl pk-yrs)     Types: Cigarettes    Smokeless tobacco: Never   Substance and Sexual Activity    Alcohol use: Never    Drug use: Never    Sexual activity: Yes     Birth control/protection: Post-menopausal     Social Determinants of Health     Financial Resource Strain: Low Risk  (12/28/2022)    Overall Financial Resource Strain (CARDIA)     Difficulty of Paying Living Expenses: Not hard at all   Food Insecurity: Unknown (12/28/2022)    Hunger Vital Sign     Ran Out of Food in the Last Year: Never true   Transportation Needs: No Transportation Needs (12/28/2022)    PRAPARE - Transportation     Lack of Transportation (Medical): No     Lack of Transportation (Non-Medical): No   Housing Stability: Unknown (12/28/2022)    Housing  Stability Vital Sign     Unable to Pay for Housing in the Last Year: No     Unstable Housing in the Last Year: No           Review of Systems:    Constitution: Negative for chills, fever, and sweats.  Negative for unexplained weight loss.    HENT:  Negative for headaches and blurry vision.    Cardiovascular:Negative for chest pain or irregular heart beat. Negative for hypertension.    Respiratory:  Negative for cough and shortness of breath.    Gastrointestinal: Negative for abdominal pain, heartburn, melena, nausea, and vomitting.    Genitourinary:  Negative bladder incontinence and dysuria.    Musculoskeletal:  See HPI    Neurological: Negative for numbness.    Psychiatric/Behavioral: Negative for depression.  The patient is not nervous/anxious.      Endocrine: Negative for polyuria    Hematologic/Lymphatic: Negative for bleeding problem.  Does not bruise/bleed easily.    Skin: Negative for poor would healing and rash      Physical Examination:    Vital Signs:    Vitals:    06/27/24 1038   BP: 128/64   Pulse: (!) 59   Temp: 97.5 °F (36.4 °C)       Body mass index is 27.34 kg/m².    General: No acute distress, alert and oriented, healthy appearing    HEENT: Head is atraumatic, mucous membranes are moist    Neck: Supples, no JVD    Cardiovascular: Palpable dorsalis pedis and posterior tibial pulses, regular rate and rhythm to those pulses    Lungs: Breathing non-labored    Skin: no rashes appreciated    Neurologic: Can flex and extend knees, ankles, and toes. Sensation is grossly intact    Left hip:  Range of motion of the left hip with significant pain discomfort.  Patient has pain with internal and external rotation.  Brisk cap refill distally.  Positive Stinchfield with recreation of her groin pain.    X-rays:  Three views of the left hip reviewed with the patient with significant central joint space narrowing.  She has Kellgren Kaveh grade 3 change of the left hip.     Assessment::  Left hip  osteoarthritis    Plan:  At this point the patient is tried and failed all conservative management with regards to their hip. They have tried and failed nonoperative management including: Anti-inflammatories, activity modification, the use of a cane. All of these have failed to completely remove their pain. They have pain going up and down stairs as well as walking on level ground. The hip pain is affecting activities of daily living They feel that theyve reached a point of disability with regards to their hip. X-rays reveal advanced, end-stage degenerative osteoarthritis as noted by subchondral sclerosis, joint space narrowing, and periarticular osteophytes. The patient would like to proceed with surgical intervention and would be a good candidate for total hip replacement.    Total hip arthroplasty procedure, alternatives, risks, and benefits were discussed in detail. The risks including but not limited to: infection, need for revision surgery, pain, swelling, loosening, injury to surrounding neurovascular structures, stiffness, incomplete resolution of pain, DVT, PE, and death were discussed in detail. Despite these risks, the patient would like to proceed with surgical intervention. All questions were answered, no guarantees made. Will plan for left [facility] on 7/8/24.        This note was created using [employer] voice recognition software that occasionally misinterpreted phrases or words.    Consult note is delivered via Epic messaging service.

## 2024-06-27 NOTE — PROGRESS NOTES
Chief Complaint:   Chief Complaint   Patient presents with    Hip Pain     Pre-op left KULWANT sx 7/8/24       History of present illness:    History of Present Illness  The patient presents for evaluation of left hip pain.    The patient reports experiencing severe pain in her left hip and groin. She has not received any injections in her left hip. She experiences difficulty in donning shoes and socks and rising from a seated position, which significantly exacerbates her discomfort. Her right hip, however, exhibits mild discomfort, albeit less severe than her left hip. She is unable to take meloxicam due to gastrointestinal discomfort. The pain, which disrupts her sleep, is severe enough to impede her ability to walk after prolonged sitting. She is a side sleeper and finds lying on her back to be challenging.  This has since the pain has affected her daily living activities.  She feels as though she has reached a point of disability would like to proceed with a left total hip arthroplasty.    Past Medical History:   Diagnosis Date    Anemia     Anxiety     Arm pain, right     Cervical spondylosis with radiculopathy     Depression     Diabetes     Dupuytren's contracture     GERD (gastroesophageal reflux disease)     Headache     Heart palpitations     Hyperlipidemia     Hypertension     Irregular heart beat     Postoperative hypothyroidism     Shoulder pain     Sleep apnea     does not use CPAP    Unspecified osteoarthritis, unspecified site        Past Surgical History:   Procedure Laterality Date    ABDOMINAL SURGERY      tummy tuck    ADENOIDECTOMY      Dont remember    AUGMENTATION OF BREAST      BLADDER SUSPENSION      BUNIONECTOMY Right     CARPAL TUNNEL RELEASE Bilateral     COLONOSCOPY      DECOMPRESSION OF ULNAR NERVE      ESOPHAGOGASTRODUODENOSCOPY      HIP SURGERY Right     HYSTERECTOMY      vaginal; partial    THYROIDECTOMY      due to goiter, Hoshimoto's disease    TONSILLECTOMY      TOTAL REPLACEMENT OF  CERVICAL INTERVERTEBRAL DISC N/A 12/27/2022    Procedure: REPLACEMENT, INTERVERTEBRAL DISC, CERVICAL, TOTAL;  Surgeon: Daniel Melissa MD;  Location: [facility];  Service: Neurosurgery;  Laterality: N/A;  C5-6, C6-7 TOTAL DISC REPLACEMENT, ACDF IF NECESSARY  NTI //  JAY    TRIGGER FINGER RELEASE      left 3rd finger       Current Outpatient Medications   Medication Sig    ascorbic acid, vitamin C, (VITAMIN C) 1000 MG tablet Take 1,000 mg by mouth once daily.    aspirin 81 MG Chew Take 81 mg by mouth once daily.    cholecalciferol, vitamin D3, 125 mcg (5,000 unit) capsule Take 5,000 Units by mouth once daily.    co-enzyme Q-10 30 mg capsule Take 200 mg by mouth once daily.    cyanocobalamin (VITAMIN B-12) 1000 MCG tablet Take 1,000 mcg by mouth every Mon, Wed, Fri.    fenofibrate (TRICOR) 54 MG tablet Take 54 mg by mouth once daily.    flecainide (TAMBOCOR) 50 MG Tab Take 50 mg by mouth 2 (two) times daily.    glimepiride (AMARYL) 2 MG tablet Take 4 mg by mouth daily with breakfast.    glucosamine/chondroitin/C/Farzad (GLUCOSAMINE 1500 COMPLEX ORAL) Take 1 tablet by mouth 2 (two) times a day.    levothyroxine (SYNTHROID) 125 MCG tablet Take 125 mcg by mouth every morning.    levothyroxine (SYNTHROID) 137 MCG Tab tablet Take 137 mcg by mouth before breakfast.    magnesium oxide (MAG-OX) 400 mg (241.3 mg magnesium) tablet Take 400 mg by mouth once daily.    meloxicam (MOBIC) 15 MG tablet Take 1 tablet (15 mg total) by mouth once daily. (Patient not taking: Reported on 6/4/2024)    meloxicam (MOBIC) 7.5 MG tablet Take 1 tablet (7.5 mg total) by mouth once daily. Take with food (Patient not taking: Reported on 6/4/2024)    metoprolol succinate (TOPROL-XL) 25 MG 24 hr tablet Take 37.5 mg by mouth 2 (two) times daily.    niacin 500 MG CpSR Take 1,000 mg by mouth every evening.    omega-3 acid ethyl esters (LOVAZA) 1 gram capsule Take 2 capsules by mouth 2 (two) times daily.    pantoprazole (PROTONIX) 20 MG tablet Take 20 mg by  mouth once daily.    rosuvastatin (CRESTOR) 10 MG tablet Take 10 mg by mouth once daily.    sertraline (ZOLOFT) 100 MG tablet Take 100 mg by mouth once daily.    topiramate (TOPAMAX) 25 MG tablet Take 1 tablet by mouth every evening x 7 days, then 1 tablet twice a day    vitamin E 400 UNIT capsule Take 400 Units by mouth once daily.    zinc sulfate (ZINC-15 ORAL) Take 50 mg by mouth once.     No current facility-administered medications for this visit.       Review of patient's allergies indicates:   Allergen Reactions    Adhesive tape-silicones        Family History   Problem Relation Name Age of Onset    Diabetes Mellitus Mother Angelina     Heart disease Mother Angelina     Hyperlipidemia Mother Angelina     Cancer Mother Angelina     Diabetes Mother Angelina     Diabetes Mellitus Father Oswaldo Anderson     Heart disease Father Oswaldo Anderson     Hypertension Father Oswaldo Anderson     Diabetes Father Oswaldo Anderson     Arthritis Brother Kamlesh     Diabetes Brother Kamlesh     Arthritis Brother Daniel     Diabetes Brother Fran        Social History     Socioeconomic History    Marital status:    Tobacco Use    Smoking status: Former     Current packs/day: 0.50     Average packs/day: 0.5 packs/day for 30.0 years (15.0 ttl pk-yrs)     Types: Cigarettes    Smokeless tobacco: Never   Substance and Sexual Activity    Alcohol use: Never    Drug use: Never    Sexual activity: Yes     Birth control/protection: Post-menopausal     Social Determinants of Health     Financial Resource Strain: Low Risk  (12/28/2022)    Overall Financial Resource Strain (CARDIA)     Difficulty of Paying Living Expenses: Not hard at all   Food Insecurity: Unknown (12/28/2022)    Hunger Vital Sign     Ran Out of Food in the Last Year: Never true   Transportation Needs: No Transportation Needs (12/28/2022)    PRAPARE - Transportation     Lack of Transportation (Medical): No     Lack of Transportation (Non-Medical): No   Housing Stability: Unknown (12/28/2022)    Housing  Stability Vital Sign     Unable to Pay for Housing in the Last Year: No     Unstable Housing in the Last Year: No           Review of Systems:    Constitution: Negative for chills, fever, and sweats.  Negative for unexplained weight loss.    HENT:  Negative for headaches and blurry vision.    Cardiovascular:Negative for chest pain or irregular heart beat. Negative for hypertension.    Respiratory:  Negative for cough and shortness of breath.    Gastrointestinal: Negative for abdominal pain, heartburn, melena, nausea, and vomitting.    Genitourinary:  Negative bladder incontinence and dysuria.    Musculoskeletal:  See HPI    Neurological: Negative for numbness.    Psychiatric/Behavioral: Negative for depression.  The patient is not nervous/anxious.      Endocrine: Negative for polyuria    Hematologic/Lymphatic: Negative for bleeding problem.  Does not bruise/bleed easily.    Skin: Negative for poor would healing and rash      Physical Examination:    Vital Signs:    Vitals:    06/27/24 1038   BP: 128/64   Pulse: (!) 59   Temp: 97.5 °F (36.4 °C)       Body mass index is 27.34 kg/m².    General: No acute distress, alert and oriented, healthy appearing    HEENT: Head is atraumatic, mucous membranes are moist    Neck: Supples, no JVD    Cardiovascular: Palpable dorsalis pedis and posterior tibial pulses, regular rate and rhythm to those pulses    Lungs: Breathing non-labored    Skin: no rashes appreciated    Neurologic: Can flex and extend knees, ankles, and toes. Sensation is grossly intact    Left hip:  Range of motion of the left hip with significant pain discomfort.  Patient has pain with internal and external rotation.  Brisk cap refill distally.  Positive Stinchfield with recreation of her groin pain.    X-rays:  Three views of the left hip reviewed with the patient with significant central joint space narrowing.  She has Kellgren Kaveh grade 3 change of the left hip.     Assessment::  Left hip  osteoarthritis    Plan:  At this point the patient is tried and failed all conservative management with regards to their hip. They have tried and failed nonoperative management including: Anti-inflammatories, activity modification, the use of a cane. All of these have failed to completely remove their pain. They have pain going up and down stairs as well as walking on level ground. The hip pain is affecting activities of daily living They feel that theyve reached a point of disability with regards to their hip. X-rays reveal advanced, end-stage degenerative osteoarthritis as noted by subchondral sclerosis, joint space narrowing, and periarticular osteophytes. The patient would like to proceed with surgical intervention and would be a good candidate for total hip replacement.    Total hip arthroplasty procedure, alternatives, risks, and benefits were discussed in detail. The risks including but not limited to: infection, need for revision surgery, pain, swelling, loosening, injury to surrounding neurovascular structures, stiffness, incomplete resolution of pain, DVT, PE, and death were discussed in detail. Despite these risks, the patient would like to proceed with surgical intervention. All questions were answered, no guarantees made. Will plan for left KULWANT on 7/8/24.        This note was created using [employer] voice recognition software that occasionally misinterpreted phrases or words.    Consult note is delivered via Epic messaging service.

## 2024-06-28 ENCOUNTER — TELEPHONE (OUTPATIENT)
Dept: ORTHOPEDICS | Facility: CLINIC | Age: 69
End: 2024-06-28
Payer: MEDICARE

## 2024-06-28 DIAGNOSIS — Z22.322 MRSA (METHICILLIN RESISTANT STAPH AUREUS) CULTURE POSITIVE: Primary | ICD-10-CM

## 2024-06-28 RX ORDER — MUPIROCIN 20 MG/G
OINTMENT TOPICAL 2 TIMES DAILY
Qty: 30 G | Refills: 0 | Status: SHIPPED | OUTPATIENT
Start: 2024-06-28 | End: 2024-07-08

## 2024-06-28 NOTE — CLINICAL REVIEW
labs/EKG/CXR reviewed. MRSA+. S Cristobal THORNTON aware, per reviewers list. cardiology notes utd. Echo noted. CRA provided. chart review complete. ccv

## 2024-06-28 NOTE — TELEPHONE ENCOUNTER
----- Message from NORBERTO Mg sent at 6/28/2024  8:47 AM CDT -----  MRSA + added Vancomycin to preop antibiotics and sent in prescription of Bactroban ointment for patient

## 2024-06-28 NOTE — TELEPHONE ENCOUNTER
I called the patient and informed her of what is stated below. I explained that she will apply the Bactroban ointment as directed on the Rx for 7 days prior to the procedure.     Patient voiced a clear understanding.

## 2024-07-05 ENCOUNTER — PATIENT MESSAGE (OUTPATIENT)
Dept: ADMINISTRATIVE | Facility: OTHER | Age: 69
End: 2024-07-05
Payer: MEDICARE

## 2024-07-06 ENCOUNTER — PATIENT MESSAGE (OUTPATIENT)
Dept: ADMINISTRATIVE | Facility: OTHER | Age: 69
End: 2024-07-06
Payer: MEDICARE

## 2024-07-07 ENCOUNTER — PATIENT MESSAGE (OUTPATIENT)
Dept: ADMINISTRATIVE | Facility: OTHER | Age: 69
End: 2024-07-07
Payer: MEDICARE

## 2024-07-07 NOTE — ANESTHESIA PREPROCEDURE EVALUATION
"                                                                                                             07/07/2024  Lachelle Alanis is a 68 y.o., female, who presents for the following:    Procedure: ROBOTIC ARTHROPLASTY,HIP (Left: Hip)   Anesthesia type: Spinal   Diagnosis: Primary osteoarthritis of left hip [M16.12]   Pre-op diagnosis: Primary osteoarthritis of left hip [M16.12]   Location: [facility] OR 08 / [facility] OR   Surgeons: Alexey Wayne MD     HT : 5' 0"    LAB:          EKG : Sinus Leonel (52 bpm)    CXR : NAF      Pre-op Assessment    I have reviewed the Patient Summary Reports.     I have reviewed the Nursing Notes. I have reviewed the NPO Status.   I have reviewed the Medications.     Review of Systems  Anesthesia Hx:  No problems with previous Anesthesia             Denies Family Hx of Anesthesia complications.    Denies Personal Hx of Anesthesia complications.                    Social:  Former Smoker       Cardiovascular:     Hypertension                                        Pulmonary:        Sleep Apnea Noncompliant w/ CPAP - Claustrophobia               Hepatic/GI:     GERD             Musculoskeletal:  Arthritis               Neurological:    Neuromuscular Disease,  Headaches     Cervical Spondylosis w/ Cervical Radiculopathy                            Endocrine:  Diabetes Hypothyroidism          Psych:    depression                Physical Exam  General: Alert and Oriented    Airway:  Mallampati: II   Mouth Opening: Normal  TM Distance: Normal  Tongue: Normal  Neck ROM: Normal ROM    Dental:  Intact    Chest/Lungs:  Normal Respiratory Rate    Heart:  Rate: Normal  Rhythm: Regular Rhythm        Anesthesia Plan  Type of Anesthesia, risks & benefits discussed:    Anesthesia Type: Gen Natural Airway, Spinal  Intra-op Monitoring Plan: Standard ASA Monitors  Post Op Pain Control Plan: IV/PO Opioids PRN  Induction:  IV  Airway Plan: Direct  Informed Consent: Informed consent signed with the Patient " and all parties understand the risks and agree with anesthesia plan.  All questions answered. Patient consented to blood products? No  ASA Score: 2  Day of Surgery Review of History & Physical: H&P Update referred to the surgeon/provider.  Anesthesia Plan Notes: Nasal cannula vs facemask supplemental oxygenation   For patients with JASPREET/obesity, may consider SuperNoval Nasal CPAP  ERAS  Spinal Anes w/ IV Propofol Sedation      Ready For Surgery From Anesthesia Perspective.     .

## 2024-07-08 ENCOUNTER — ANESTHESIA (OUTPATIENT)
Dept: SURGERY | Facility: HOSPITAL | Age: 69
End: 2024-07-08
Payer: MEDICARE

## 2024-07-08 ENCOUNTER — HOSPITAL ENCOUNTER (OUTPATIENT)
Facility: HOSPITAL | Age: 69
Discharge: HOME | End: 2024-07-09
Attending: ORTHOPAEDIC SURGERY | Admitting: ORTHOPAEDIC SURGERY
Payer: MEDICARE

## 2024-07-08 DIAGNOSIS — Z22.322 MRSA (METHICILLIN RESISTANT STAPH AUREUS) CULTURE POSITIVE: ICD-10-CM

## 2024-07-08 DIAGNOSIS — Z01.818 PREOP TESTING: ICD-10-CM

## 2024-07-08 DIAGNOSIS — M16.12 PRIMARY OSTEOARTHRITIS OF LEFT HIP: ICD-10-CM

## 2024-07-08 DIAGNOSIS — R79.9 ABNORMAL BLOOD CHEMISTRY LEVEL: ICD-10-CM

## 2024-07-08 DIAGNOSIS — M19.90 OSTEOARTHRITIS: ICD-10-CM

## 2024-07-08 LAB
ABORH RETYPE: NORMAL
GROUP & RH: NORMAL
HCT VFR BLD AUTO: 23.4 % (ref 37–47)
HCT VFR BLD AUTO: 28.7 % (ref 37–47)
HGB BLD-MCNC: 7.4 G/DL (ref 12–16)
HGB BLD-MCNC: 9.2 G/DL (ref 12–16)
INDIRECT COOMBS: NORMAL
POCT GLUCOSE: 125 MG/DL (ref 70–110)
POCT GLUCOSE: 143 MG/DL (ref 70–110)
POCT GLUCOSE: 164 MG/DL (ref 70–110)
POCT GLUCOSE: 169 MG/DL (ref 70–110)
SPECIMEN OUTDATE: NORMAL
VANCOMYCIN TROUGH SERPL-MCNC: 12.2 UG/ML (ref 15–20)

## 2024-07-08 PROCEDURE — 36415 COLL VENOUS BLD VENIPUNCTURE: CPT | Performed by: ORTHOPAEDIC SURGERY

## 2024-07-08 PROCEDURE — 96366 THER/PROPH/DIAG IV INF ADDON: CPT | Mod: 59

## 2024-07-08 PROCEDURE — 36000712 HC OR TIME LEV V 1ST 15 MIN: Performed by: ORTHOPAEDIC SURGERY

## 2024-07-08 PROCEDURE — 27130 TOTAL HIP ARTHROPLASTY: CPT | Mod: AS,LT,, | Performed by: NURSE PRACTITIONER

## 2024-07-08 PROCEDURE — 25000003 PHARM REV CODE 250: Performed by: NURSE ANESTHETIST, CERTIFIED REGISTERED

## 2024-07-08 PROCEDURE — 80202 ASSAY OF VANCOMYCIN: CPT | Performed by: NURSE PRACTITIONER

## 2024-07-08 PROCEDURE — 63600175 PHARM REV CODE 636 W HCPCS

## 2024-07-08 PROCEDURE — 85014 HEMATOCRIT: CPT | Mod: 91 | Performed by: NURSE PRACTITIONER

## 2024-07-08 PROCEDURE — 86923 COMPATIBILITY TEST ELECTRIC: CPT | Performed by: NURSE PRACTITIONER

## 2024-07-08 PROCEDURE — 36415 COLL VENOUS BLD VENIPUNCTURE: CPT | Performed by: NURSE PRACTITIONER

## 2024-07-08 PROCEDURE — C1776 JOINT DEVICE (IMPLANTABLE): HCPCS | Performed by: ORTHOPAEDIC SURGERY

## 2024-07-08 PROCEDURE — 96375 TX/PRO/DX INJ NEW DRUG ADDON: CPT | Mod: 59

## 2024-07-08 PROCEDURE — G0378 HOSPITAL OBSERVATION PER HR: HCPCS

## 2024-07-08 PROCEDURE — 27201423 OPTIME MED/SURG SUP & DEVICES STERILE SUPPLY: Performed by: ORTHOPAEDIC SURGERY

## 2024-07-08 PROCEDURE — 63600175 PHARM REV CODE 636 W HCPCS: Performed by: NURSE PRACTITIONER

## 2024-07-08 PROCEDURE — P9016 RBC LEUKOCYTES REDUCED: HCPCS | Performed by: NURSE PRACTITIONER

## 2024-07-08 PROCEDURE — 82962 GLUCOSE BLOOD TEST: CPT | Performed by: ORTHOPAEDIC SURGERY

## 2024-07-08 PROCEDURE — 96361 HYDRATE IV INFUSION ADD-ON: CPT | Mod: 59

## 2024-07-08 PROCEDURE — 37000008 HC ANESTHESIA 1ST 15 MINUTES: Performed by: ORTHOPAEDIC SURGERY

## 2024-07-08 PROCEDURE — 63600175 PHARM REV CODE 636 W HCPCS: Performed by: ORTHOPAEDIC SURGERY

## 2024-07-08 PROCEDURE — 88311 DECALCIFY TISSUE: CPT

## 2024-07-08 PROCEDURE — 25000003 PHARM REV CODE 250: Performed by: ORTHOPAEDIC SURGERY

## 2024-07-08 PROCEDURE — 71000033 HC RECOVERY, INTIAL HOUR: Performed by: ORTHOPAEDIC SURGERY

## 2024-07-08 PROCEDURE — C1713 ANCHOR/SCREW BN/BN,TIS/BN: HCPCS | Performed by: ORTHOPAEDIC SURGERY

## 2024-07-08 PROCEDURE — 99900031 HC PATIENT EDUCATION (STAT)

## 2024-07-08 PROCEDURE — 94799 UNLISTED PULMONARY SVC/PX: CPT

## 2024-07-08 PROCEDURE — 96376 TX/PRO/DX INJ SAME DRUG ADON: CPT | Mod: 59

## 2024-07-08 PROCEDURE — 88304 TISSUE EXAM BY PATHOLOGIST: CPT | Performed by: ORTHOPAEDIC SURGERY

## 2024-07-08 PROCEDURE — 36000713 HC OR TIME LEV V EA ADD 15 MIN: Performed by: ORTHOPAEDIC SURGERY

## 2024-07-08 PROCEDURE — 27000221 HC OXYGEN, UP TO 24 HOURS

## 2024-07-08 PROCEDURE — 99900035 HC TECH TIME PER 15 MIN (STAT)

## 2024-07-08 PROCEDURE — 25000003 PHARM REV CODE 250: Performed by: NURSE PRACTITIONER

## 2024-07-08 PROCEDURE — 36415 COLL VENOUS BLD VENIPUNCTURE: CPT | Mod: 91 | Performed by: NURSE PRACTITIONER

## 2024-07-08 PROCEDURE — 27130 TOTAL HIP ARTHROPLASTY: CPT | Mod: LT,,, | Performed by: ORTHOPAEDIC SURGERY

## 2024-07-08 PROCEDURE — A4216 STERILE WATER/SALINE, 10 ML: HCPCS | Performed by: ORTHOPAEDIC SURGERY

## 2024-07-08 PROCEDURE — 96365 THER/PROPH/DIAG IV INF INIT: CPT | Mod: 59

## 2024-07-08 PROCEDURE — 0055T BONE SRGRY CMPTR CT/MRI IMAG: CPT | Mod: ,,, | Performed by: ORTHOPAEDIC SURGERY

## 2024-07-08 PROCEDURE — 51798 US URINE CAPACITY MEASURE: CPT

## 2024-07-08 PROCEDURE — 63600175 PHARM REV CODE 636 W HCPCS: Performed by: NURSE ANESTHETIST, CERTIFIED REGISTERED

## 2024-07-08 PROCEDURE — 37000009 HC ANESTHESIA EA ADD 15 MINS: Performed by: ORTHOPAEDIC SURGERY

## 2024-07-08 PROCEDURE — 25000003 PHARM REV CODE 250

## 2024-07-08 PROCEDURE — 94761 N-INVAS EAR/PLS OXIMETRY MLT: CPT

## 2024-07-08 PROCEDURE — 96372 THER/PROPH/DIAG INJ SC/IM: CPT | Performed by: ORTHOPAEDIC SURGERY

## 2024-07-08 PROCEDURE — 86901 BLOOD TYPING SEROLOGIC RH(D): CPT | Performed by: NURSE PRACTITIONER

## 2024-07-08 PROCEDURE — 36430 TRANSFUSION BLD/BLD COMPNT: CPT

## 2024-07-08 PROCEDURE — 97162 PT EVAL MOD COMPLEX 30 MIN: CPT

## 2024-07-08 PROCEDURE — 85014 HEMATOCRIT: CPT | Performed by: ORTHOPAEDIC SURGERY

## 2024-07-08 DEVICE — TRIDENT II TRITANIUM CLUSTER 46C
Type: IMPLANTABLE DEVICE | Site: HIP | Status: FUNCTIONAL
Brand: TRIDENT II

## 2024-07-08 DEVICE — 127 DEGREE NECK ANGLE HIP STEM
Type: IMPLANTABLE DEVICE | Site: HIP | Status: FUNCTIONAL
Brand: ACCOLADE

## 2024-07-08 DEVICE — TRIDENT X3 0 DEGREE POLYETHYLENE INSERT
Type: IMPLANTABLE DEVICE | Site: HIP | Status: FUNCTIONAL
Brand: TRIDENT X3 INSERT

## 2024-07-08 DEVICE — CERAMIC V40 FEMORAL HEAD
Type: IMPLANTABLE DEVICE | Site: HIP | Status: FUNCTIONAL
Brand: BIOLOX

## 2024-07-08 RX ORDER — ONDANSETRON HYDROCHLORIDE 2 MG/ML
4 INJECTION, SOLUTION INTRAVENOUS ONCE AS NEEDED
Status: DISCONTINUED | OUTPATIENT
Start: 2024-07-08 | End: 2024-07-08 | Stop reason: HOSPADM

## 2024-07-08 RX ORDER — DOCUSATE SODIUM 100 MG/1
200 CAPSULE, LIQUID FILLED ORAL DAILY
Status: DISCONTINUED | OUTPATIENT
Start: 2024-07-09 | End: 2024-07-09 | Stop reason: HOSPADM

## 2024-07-08 RX ORDER — FLECAINIDE ACETATE 50 MG/1
50 TABLET ORAL 2 TIMES DAILY
Status: DISCONTINUED | OUTPATIENT
Start: 2024-07-08 | End: 2024-07-09 | Stop reason: HOSPADM

## 2024-07-08 RX ORDER — KETOROLAC TROMETHAMINE 30 MG/ML
15 INJECTION, SOLUTION INTRAMUSCULAR; INTRAVENOUS EVERY 6 HOURS
Status: COMPLETED | OUTPATIENT
Start: 2024-07-08 | End: 2024-07-09

## 2024-07-08 RX ORDER — MORPHINE SULFATE 4 MG/ML
4 INJECTION, SOLUTION INTRAMUSCULAR; INTRAVENOUS
Status: ACTIVE | OUTPATIENT
Start: 2024-07-08 | End: 2024-07-09

## 2024-07-08 RX ORDER — HYDROMORPHONE HYDROCHLORIDE 2 MG/ML
0.4 INJECTION, SOLUTION INTRAMUSCULAR; INTRAVENOUS; SUBCUTANEOUS EVERY 5 MIN PRN
Status: DISCONTINUED | OUTPATIENT
Start: 2024-07-08 | End: 2024-07-08 | Stop reason: HOSPADM

## 2024-07-08 RX ORDER — METOPROLOL SUCCINATE 25 MG/1
25 TABLET, EXTENDED RELEASE ORAL 2 TIMES DAILY
Status: DISCONTINUED | OUTPATIENT
Start: 2024-07-08 | End: 2024-07-09 | Stop reason: HOSPADM

## 2024-07-08 RX ORDER — ACETAMINOPHEN 500 MG
500 TABLET ORAL EVERY 4 HOURS
Status: DISCONTINUED | OUTPATIENT
Start: 2024-07-08 | End: 2024-07-09 | Stop reason: HOSPADM

## 2024-07-08 RX ORDER — AMOXICILLIN 250 MG
2 CAPSULE ORAL 2 TIMES DAILY
Status: DISCONTINUED | OUTPATIENT
Start: 2024-07-08 | End: 2024-07-09 | Stop reason: HOSPADM

## 2024-07-08 RX ORDER — SODIUM CHLORIDE 0.9 % (FLUSH) 0.9 %
SYRINGE (ML) INJECTION
Status: DISPENSED
Start: 2024-07-08 | End: 2024-07-08

## 2024-07-08 RX ORDER — SODIUM CHLORIDE 9 MG/ML
INJECTION, SOLUTION INTRAVENOUS CONTINUOUS
Status: DISCONTINUED | OUTPATIENT
Start: 2024-07-08 | End: 2024-07-09 | Stop reason: HOSPADM

## 2024-07-08 RX ORDER — CELECOXIB 200 MG/1
200 CAPSULE ORAL 2 TIMES DAILY
COMMUNITY

## 2024-07-08 RX ORDER — ROPIVACAINE HYDROCHLORIDE 5 MG/ML
INJECTION, SOLUTION EPIDURAL; INFILTRATION; PERINEURAL
Status: DISCONTINUED | OUTPATIENT
Start: 2024-07-08 | End: 2024-07-08

## 2024-07-08 RX ORDER — SERTRALINE HYDROCHLORIDE 50 MG/1
100 TABLET, FILM COATED ORAL DAILY
Status: DISCONTINUED | OUTPATIENT
Start: 2024-07-08 | End: 2024-07-09 | Stop reason: HOSPADM

## 2024-07-08 RX ORDER — EPINEPHRINE 1 MG/ML
INJECTION, SOLUTION, CONCENTRATE INTRAVENOUS
Status: DISCONTINUED | OUTPATIENT
Start: 2024-07-08 | End: 2024-07-08

## 2024-07-08 RX ORDER — GABAPENTIN 300 MG/1
300 CAPSULE ORAL
Status: COMPLETED | OUTPATIENT
Start: 2024-07-08 | End: 2024-07-08

## 2024-07-08 RX ORDER — MORPHINE SULFATE 10 MG/ML
INJECTION INTRAMUSCULAR; INTRAVENOUS; SUBCUTANEOUS
Status: DISCONTINUED | OUTPATIENT
Start: 2024-07-08 | End: 2024-07-08

## 2024-07-08 RX ORDER — NAPROXEN SODIUM 220 MG/1
81 TABLET, FILM COATED ORAL 2 TIMES DAILY
Status: DISCONTINUED | OUTPATIENT
Start: 2024-07-09 | End: 2024-07-09 | Stop reason: HOSPADM

## 2024-07-08 RX ORDER — VANCOMYCIN HYDROCHLORIDE 1 G/20ML
INJECTION, POWDER, LYOPHILIZED, FOR SOLUTION INTRAVENOUS
Status: DISCONTINUED | OUTPATIENT
Start: 2024-07-08 | End: 2024-07-08

## 2024-07-08 RX ORDER — GABAPENTIN 300 MG/1
300 CAPSULE ORAL NIGHTLY
Status: DISCONTINUED | OUTPATIENT
Start: 2024-07-08 | End: 2024-07-08

## 2024-07-08 RX ORDER — MORPHINE SULFATE 10 MG/ML
INJECTION INTRAMUSCULAR; INTRAVENOUS; SUBCUTANEOUS
Status: DISPENSED
Start: 2024-07-08 | End: 2024-07-08

## 2024-07-08 RX ORDER — IBUPROFEN 200 MG
24 TABLET ORAL
Status: DISCONTINUED | OUTPATIENT
Start: 2024-07-08 | End: 2024-07-09 | Stop reason: HOSPADM

## 2024-07-08 RX ORDER — LIDOCAINE HYDROCHLORIDE 10 MG/ML
1 INJECTION, SOLUTION EPIDURAL; INFILTRATION; INTRACAUDAL; PERINEURAL ONCE
Status: DISCONTINUED | OUTPATIENT
Start: 2024-07-08 | End: 2024-07-08

## 2024-07-08 RX ORDER — HYDROCODONE BITARTRATE AND ACETAMINOPHEN 500; 5 MG/1; MG/1
TABLET ORAL
Status: DISCONTINUED | OUTPATIENT
Start: 2024-07-08 | End: 2024-07-09 | Stop reason: HOSPADM

## 2024-07-08 RX ORDER — ACETAMINOPHEN 500 MG
1000 TABLET ORAL
Status: DISCONTINUED | OUTPATIENT
Start: 2024-07-08 | End: 2024-07-08

## 2024-07-08 RX ORDER — SODIUM CHLORIDE, SODIUM LACTATE, POTASSIUM CHLORIDE, CALCIUM CHLORIDE 600; 310; 30; 20 MG/100ML; MG/100ML; MG/100ML; MG/100ML
INJECTION, SOLUTION INTRAVENOUS CONTINUOUS
Status: DISCONTINUED | OUTPATIENT
Start: 2024-07-08 | End: 2024-07-08

## 2024-07-08 RX ORDER — BUPIVACAINE HYDROCHLORIDE 7.5 MG/ML
INJECTION, SOLUTION EPIDURAL; RETROBULBAR
Status: COMPLETED | OUTPATIENT
Start: 2024-07-08 | End: 2024-07-08

## 2024-07-08 RX ORDER — SODIUM CHLORIDE 9 MG/ML
INJECTION, SOLUTION INTRAMUSCULAR; INTRAVENOUS; SUBCUTANEOUS
Status: DISCONTINUED | OUTPATIENT
Start: 2024-07-08 | End: 2024-07-08

## 2024-07-08 RX ORDER — ONDANSETRON HYDROCHLORIDE 2 MG/ML
4 INJECTION, SOLUTION INTRAVENOUS EVERY 6 HOURS PRN
Status: DISCONTINUED | OUTPATIENT
Start: 2024-07-08 | End: 2024-07-09 | Stop reason: HOSPADM

## 2024-07-08 RX ORDER — LACTULOSE 10 G/15ML
20 SOLUTION ORAL EVERY 6 HOURS PRN
Status: DISCONTINUED | OUTPATIENT
Start: 2024-07-08 | End: 2024-07-09 | Stop reason: HOSPADM

## 2024-07-08 RX ORDER — GLUCAGON 1 MG
1 KIT INJECTION
Status: DISCONTINUED | OUTPATIENT
Start: 2024-07-08 | End: 2024-07-09 | Stop reason: HOSPADM

## 2024-07-08 RX ORDER — HYDROCODONE BITARTRATE AND ACETAMINOPHEN 5; 325 MG/1; MG/1
1 TABLET ORAL EVERY 4 HOURS PRN
Status: DISCONTINUED | OUTPATIENT
Start: 2024-07-08 | End: 2024-07-09 | Stop reason: HOSPADM

## 2024-07-08 RX ORDER — CEFADROXIL 500 MG/1
500 CAPSULE ORAL EVERY 12 HOURS
Qty: 14 CAPSULE | Refills: 0 | Status: SHIPPED | OUTPATIENT
Start: 2024-07-08 | End: 2024-07-15

## 2024-07-08 RX ORDER — SCOLOPAMINE TRANSDERMAL SYSTEM 1 MG/1
1 PATCH, EXTENDED RELEASE TRANSDERMAL ONCE AS NEEDED
Status: DISCONTINUED | OUTPATIENT
Start: 2024-07-08 | End: 2024-07-08

## 2024-07-08 RX ORDER — ONDANSETRON 4 MG/1
4 TABLET, ORALLY DISINTEGRATING ORAL
Status: COMPLETED | OUTPATIENT
Start: 2024-07-08 | End: 2024-07-08

## 2024-07-08 RX ORDER — TALC
6 POWDER (GRAM) TOPICAL NIGHTLY PRN
Status: DISCONTINUED | OUTPATIENT
Start: 2024-07-08 | End: 2024-07-09 | Stop reason: HOSPADM

## 2024-07-08 RX ORDER — EPHEDRINE SULFATE 50 MG/ML
INJECTION, SOLUTION INTRAVENOUS
Status: DISCONTINUED | OUTPATIENT
Start: 2024-07-08 | End: 2024-07-08

## 2024-07-08 RX ORDER — INSULIN ASPART 100 [IU]/ML
1-10 INJECTION, SOLUTION INTRAVENOUS; SUBCUTANEOUS
Status: DISCONTINUED | OUTPATIENT
Start: 2024-07-08 | End: 2024-07-09 | Stop reason: HOSPADM

## 2024-07-08 RX ORDER — GLIMEPIRIDE 4 MG/1
4 TABLET ORAL
Status: DISCONTINUED | OUTPATIENT
Start: 2024-07-08 | End: 2024-07-09 | Stop reason: HOSPADM

## 2024-07-08 RX ORDER — CEFAZOLIN SODIUM 2 G/50ML
SOLUTION INTRAVENOUS
Status: DISCONTINUED | OUTPATIENT
Start: 2024-07-08 | End: 2024-07-08

## 2024-07-08 RX ORDER — PROPOFOL 10 MG/ML
VIAL (ML) INTRAVENOUS CONTINUOUS PRN
Status: DISCONTINUED | OUTPATIENT
Start: 2024-07-08 | End: 2024-07-08

## 2024-07-08 RX ORDER — IBUPROFEN 200 MG
16 TABLET ORAL
Status: DISCONTINUED | OUTPATIENT
Start: 2024-07-08 | End: 2024-07-09 | Stop reason: HOSPADM

## 2024-07-08 RX ORDER — ACETAMINOPHEN 10 MG/ML
1000 INJECTION, SOLUTION INTRAVENOUS ONCE
Status: COMPLETED | OUTPATIENT
Start: 2024-07-08 | End: 2024-07-08

## 2024-07-08 RX ORDER — KETOROLAC TROMETHAMINE 30 MG/ML
INJECTION, SOLUTION INTRAMUSCULAR; INTRAVENOUS
Status: DISPENSED
Start: 2024-07-08 | End: 2024-07-08

## 2024-07-08 RX ORDER — TRANEXAMIC ACID 650 MG/1
1950 TABLET ORAL
Status: DISCONTINUED | OUTPATIENT
Start: 2024-07-08 | End: 2024-07-08

## 2024-07-08 RX ORDER — SODIUM CHLORIDE, SODIUM GLUCONATE, SODIUM ACETATE, POTASSIUM CHLORIDE AND MAGNESIUM CHLORIDE 30; 37; 368; 526; 502 MG/100ML; MG/100ML; MG/100ML; MG/100ML; MG/100ML
INJECTION, SOLUTION INTRAVENOUS CONTINUOUS
Status: DISCONTINUED | OUTPATIENT
Start: 2024-07-08 | End: 2024-07-08

## 2024-07-08 RX ORDER — ROPIVACAINE HYDROCHLORIDE 5 MG/ML
INJECTION, SOLUTION EPIDURAL; INFILTRATION; PERINEURAL
Status: DISPENSED
Start: 2024-07-08 | End: 2024-07-08

## 2024-07-08 RX ORDER — KETOROLAC TROMETHAMINE 10 MG/1
10 TABLET, FILM COATED ORAL
Status: COMPLETED | OUTPATIENT
Start: 2024-07-08 | End: 2024-07-08

## 2024-07-08 RX ORDER — METHOCARBAMOL 750 MG/1
750 TABLET, FILM COATED ORAL EVERY 8 HOURS PRN
Status: DISCONTINUED | OUTPATIENT
Start: 2024-07-08 | End: 2024-07-09 | Stop reason: HOSPADM

## 2024-07-08 RX ORDER — VANCOMYCIN HYDROCHLORIDE 1 G/20ML
INJECTION, POWDER, LYOPHILIZED, FOR SOLUTION INTRAVENOUS
Status: DISPENSED
Start: 2024-07-08 | End: 2024-07-08

## 2024-07-08 RX ORDER — MIDODRINE HYDROCHLORIDE 5 MG/1
5 TABLET ORAL ONCE
Status: COMPLETED | OUTPATIENT
Start: 2024-07-08 | End: 2024-07-08

## 2024-07-08 RX ORDER — GLIMEPIRIDE 1 MG/1
2 TABLET ORAL NIGHTLY
Status: DISCONTINUED | OUTPATIENT
Start: 2024-07-08 | End: 2024-07-09 | Stop reason: HOSPADM

## 2024-07-08 RX ORDER — POLYETHYLENE GLYCOL 3350 17 G/17G
17 POWDER, FOR SOLUTION ORAL NIGHTLY
Status: DISCONTINUED | OUTPATIENT
Start: 2024-07-08 | End: 2024-07-09 | Stop reason: HOSPADM

## 2024-07-08 RX ORDER — FAMOTIDINE 20 MG/1
20 TABLET, FILM COATED ORAL 2 TIMES DAILY
Status: DISCONTINUED | OUTPATIENT
Start: 2024-07-08 | End: 2024-07-09 | Stop reason: HOSPADM

## 2024-07-08 RX ORDER — TRAMADOL HYDROCHLORIDE 50 MG/1
50 TABLET ORAL EVERY 4 HOURS PRN
Status: DISCONTINUED | OUTPATIENT
Start: 2024-07-08 | End: 2024-07-09 | Stop reason: HOSPADM

## 2024-07-08 RX ORDER — PHENYLEPHRINE HYDROCHLORIDE 10 MG/ML
INJECTION INTRAVENOUS CONTINUOUS PRN
Status: DISCONTINUED | OUTPATIENT
Start: 2024-07-08 | End: 2024-07-08

## 2024-07-08 RX ORDER — ALUMINUM HYDROXIDE, MAGNESIUM HYDROXIDE, AND SIMETHICONE 1200; 120; 1200 MG/30ML; MG/30ML; MG/30ML
30 SUSPENSION ORAL EVERY 6 HOURS PRN
Status: DISCONTINUED | OUTPATIENT
Start: 2024-07-08 | End: 2024-07-09 | Stop reason: HOSPADM

## 2024-07-08 RX ORDER — GLUCAGON 1 MG
1 KIT INJECTION
Status: DISCONTINUED | OUTPATIENT
Start: 2024-07-08 | End: 2024-07-08 | Stop reason: HOSPADM

## 2024-07-08 RX ORDER — MIDAZOLAM HYDROCHLORIDE 1 MG/ML
INJECTION INTRAMUSCULAR; INTRAVENOUS
Status: DISCONTINUED | OUTPATIENT
Start: 2024-07-08 | End: 2024-07-08

## 2024-07-08 RX ORDER — KETOROLAC TROMETHAMINE 30 MG/ML
INJECTION, SOLUTION INTRAMUSCULAR; INTRAVENOUS
Status: DISCONTINUED | OUTPATIENT
Start: 2024-07-08 | End: 2024-07-08

## 2024-07-08 RX ORDER — FUROSEMIDE 10 MG/ML
20 INJECTION INTRAMUSCULAR; INTRAVENOUS ONCE
Status: DISCONTINUED | OUTPATIENT
Start: 2024-07-09 | End: 2024-07-09 | Stop reason: HOSPADM

## 2024-07-08 RX ORDER — METOCLOPRAMIDE HYDROCHLORIDE 5 MG/ML
10 INJECTION INTRAMUSCULAR; INTRAVENOUS
Status: DISCONTINUED | OUTPATIENT
Start: 2024-07-08 | End: 2024-07-09 | Stop reason: HOSPADM

## 2024-07-08 RX ORDER — EPINEPHRINE 1 MG/ML
INJECTION, SOLUTION, CONCENTRATE INTRAVENOUS
Status: DISPENSED
Start: 2024-07-08 | End: 2024-07-08

## 2024-07-08 RX ORDER — BISACODYL 10 MG/1
10 SUPPOSITORY RECTAL DAILY
Status: DISCONTINUED | OUTPATIENT
Start: 2024-07-11 | End: 2024-07-09 | Stop reason: HOSPADM

## 2024-07-08 RX ADMIN — FLECAINIDE ACETATE TABLET 50 MG: 50 TABLET ORAL at 08:07

## 2024-07-08 RX ADMIN — PHENYLEPHRINE HYDROCHLORIDE 0.5 MCG/KG/MIN: 10 INJECTION INTRAVENOUS at 06:07

## 2024-07-08 RX ADMIN — INSULIN ASPART 1 UNITS: 100 INJECTION, SOLUTION INTRAVENOUS; SUBCUTANEOUS at 08:07

## 2024-07-08 RX ADMIN — ONDANSETRON 4 MG: 4 TABLET, ORALLY DISINTEGRATING ORAL at 06:07

## 2024-07-08 RX ADMIN — BUPIVACAINE HYDROCHLORIDE 1.8 ML: 7.5 INJECTION, SOLUTION EPIDURAL; RETROBULBAR at 06:07

## 2024-07-08 RX ADMIN — POLYETHYLENE GLYCOL 3350 17 G: 17 POWDER, FOR SOLUTION ORAL at 08:07

## 2024-07-08 RX ADMIN — ACETAMINOPHEN 1000 MG: 10 INJECTION INTRAVENOUS at 04:07

## 2024-07-08 RX ADMIN — MIDODRINE HYDROCHLORIDE 5 MG: 5 TABLET ORAL at 04:07

## 2024-07-08 RX ADMIN — SODIUM CHLORIDE, SODIUM GLUCONATE, SODIUM ACETATE, POTASSIUM CHLORIDE AND MAGNESIUM CHLORIDE: 526; 502; 368; 37; 30 INJECTION, SOLUTION INTRAVENOUS at 06:07

## 2024-07-08 RX ADMIN — CEFAZOLIN 2 G: 2 INJECTION, POWDER, FOR SOLUTION INTRAMUSCULAR; INTRAVENOUS at 08:07

## 2024-07-08 RX ADMIN — CEFAZOLIN 2 G: 2 INJECTION, POWDER, FOR SOLUTION INTRAMUSCULAR; INTRAVENOUS at 03:07

## 2024-07-08 RX ADMIN — MIDAZOLAM 2 MG: 1 INJECTION INTRAMUSCULAR; INTRAVENOUS at 06:07

## 2024-07-08 RX ADMIN — METOCLOPRAMIDE 10 MG: 5 INJECTION, SOLUTION INTRAMUSCULAR; INTRAVENOUS at 05:07

## 2024-07-08 RX ADMIN — INSULIN ASPART 2 UNITS: 100 INJECTION, SOLUTION INTRAVENOUS; SUBCUTANEOUS at 04:07

## 2024-07-08 RX ADMIN — GLIMEPIRIDE 2 MG: 1 TABLET ORAL at 05:07

## 2024-07-08 RX ADMIN — KETOROLAC TROMETHAMINE 10 MG: 10 TABLET, FILM COATED ORAL at 06:07

## 2024-07-08 RX ADMIN — VANCOMYCIN HYDROCHLORIDE 1000 MG: 1 INJECTION, POWDER, LYOPHILIZED, FOR SOLUTION INTRAVENOUS at 06:07

## 2024-07-08 RX ADMIN — GABAPENTIN 300 MG: 300 CAPSULE ORAL at 06:07

## 2024-07-08 RX ADMIN — KETOROLAC TROMETHAMINE 15 MG: 30 INJECTION, SOLUTION INTRAMUSCULAR at 04:07

## 2024-07-08 RX ADMIN — PROPOFOL 75 MCG/KG/MIN: 10 INJECTION, EMULSION INTRAVENOUS at 06:07

## 2024-07-08 RX ADMIN — CEFAZOLIN SODIUM 2 G: 2 SOLUTION INTRAVENOUS at 06:07

## 2024-07-08 RX ADMIN — INSULIN ASPART 2 UNITS: 100 INJECTION, SOLUTION INTRAVENOUS; SUBCUTANEOUS at 10:07

## 2024-07-08 RX ADMIN — EPHEDRINE SULFATE 25 MG: 50 INJECTION INTRAVENOUS at 07:07

## 2024-07-08 RX ADMIN — SODIUM CHLORIDE: 9 INJECTION, SOLUTION INTRAVENOUS at 09:07

## 2024-07-08 RX ADMIN — SODIUM CHLORIDE: 9 INJECTION, SOLUTION INTRAVENOUS at 05:07

## 2024-07-08 RX ADMIN — FAMOTIDINE 20 MG: 20 TABLET, FILM COATED ORAL at 08:07

## 2024-07-08 RX ADMIN — ACETAMINOPHEN 500 MG: 500 TABLET ORAL at 09:07

## 2024-07-08 RX ADMIN — METOCLOPRAMIDE 10 MG: 5 INJECTION, SOLUTION INTRAMUSCULAR; INTRAVENOUS at 11:07

## 2024-07-08 RX ADMIN — SENNOSIDES AND DOCUSATE SODIUM 2 TABLET: 50; 8.6 TABLET ORAL at 08:07

## 2024-07-08 NOTE — PLAN OF CARE
Problem: Physical Therapy  Goal: Physical Therapy Goal  Description: Pt will improve functional independence by performing:    Bed mobility: SBA  Sit to stand: SBA with rolling walker  Bed to chair t/f: SBA with Stand Step  with rolling walker  Ambulation x 200'  with SBA with rolling walker  1 Step (Curb): Min A  with rolling walker  3 Steps: Min A  with B HR  Independent with total hip HEP   Outcome: Progressing

## 2024-07-08 NOTE — ANESTHESIA PROCEDURE NOTES
Spinal    Diagnosis: OA , Left Hip  Patient location during procedure: OR  Start time: 7/8/2024 6:50 AM  Timeout: 7/8/2024 6:47 AM  End time: 7/8/2024 6:53 AM    Staffing  Authorizing Provider: Renny Villa MD  Performing Provider: Renny Villa MD    Staffing  Performed by: Renny Villa MD  Authorized by: Renny Villa MD    Spinal Block  Patient position: sitting  Prep: ChloraPrep  Patient monitoring: heart rate, cardiac monitor, continuous pulse ox and frequent blood pressure checks  Approach: midline  Location: L3-4  Injection technique: single shot  CSF Fluid: clear free-flowing CSF  Needle  Needle type: Megan   Needle gauge: 25 G  Needle length: 3.5 in  Additional Documentation: no paresthesia on injection  Needle localization: anatomical landmarks  Assessment  Sensory level: T5   Dermatomal levels determined by pinch or prick  Ease of block: moderate  Patient's tolerance of the procedure: comfortable throughout block and no complaints  Medications:    Medications: bupivacaine (pf) (MARCAINE) injection 0.75% - Intraspinal   1.8 mL - 7/8/2024 6:53:00 AM

## 2024-07-08 NOTE — PLAN OF CARE
Problem: Adult Inpatient Plan of Care  Goal: Plan of Care Review  7/8/2024 1105 by Thu Dennison RN  Outcome: Progressing  7/8/2024 1105 by Thu Dennison RN  Outcome: Progressing  Goal: Patient-Specific Goal (Individualized)  7/8/2024 1105 by Thu Dennison RN  Outcome: Progressing  7/8/2024 1105 by Thu Dennison, RN  Outcome: Progressing  Goal: Absence of Hospital-Acquired Illness or Injury  7/8/2024 1105 by Thu Dennison, RN  Outcome: Progressing  7/8/2024 1105 by Thu Dennison, RN  Outcome: Progressing  Goal: Optimal Comfort and Wellbeing  7/8/2024 1105 by Thu Dennison RN  Outcome: Progressing  7/8/2024 1105 by Thu Dennison, RN  Outcome: Progressing  Goal: Readiness for Transition of Care  7/8/2024 1105 by Thu Dennison, RN  Outcome: Progressing  7/8/2024 1105 by Thu Dennison RN  Outcome: Progressing     Problem: Wound  Goal: Optimal Coping  7/8/2024 1105 by Thu Dennison RN  Outcome: Progressing  7/8/2024 1105 by Thu Dennison RN  Outcome: Progressing  Goal: Optimal Functional Ability  7/8/2024 1105 by Thu Dennison RN  Outcome: Progressing  7/8/2024 1105 by Thu Dennison, RN  Outcome: Progressing  Goal: Absence of Infection Signs and Symptoms  7/8/2024 1105 by Thu Dennison, RN  Outcome: Progressing  7/8/2024 1105 by Thu Dennison, RN  Outcome: Progressing  Goal: Improved Oral Intake  7/8/2024 1105 by Thu Dennison, RN  Outcome: Progressing  7/8/2024 1105 by Thu Dennison, RN  Outcome: Progressing  Goal: Optimal Pain Control and Function  7/8/2024 1105 by Thu Dennison, RN  Outcome: Progressing  7/8/2024 1105 by Thu Dennison, RN  Outcome: Progressing  Goal: Skin Health and Integrity  7/8/2024 1105 by Thu Dennison, RN  Outcome: Progressing  7/8/2024 1105 by Thu Dennison RN  Outcome: Progressing  Goal: Optimal Wound Healing  7/8/2024 1105 by Thu Dennison RN  Outcome: Progressing  7/8/2024 1105 by Thu Dennison RN  Outcome: Progressing     Problem: Infection  Goal: Absence of Infection Signs and  Symptoms  7/8/2024 1105 by Thu Dennison RN  Outcome: Progressing  7/8/2024 1105 by Thu Dennison, RN  Outcome: Progressing     Problem: Skin Injury Risk Increased  Goal: Skin Health and Integrity  7/8/2024 1105 by Thu Dennison, RN  Outcome: Progressing  7/8/2024 1105 by Thu Dennison, RN  Outcome: Progressing     Problem: Comorbidity Management  Goal: Maintenance of Behavioral Health Symptom Control  7/8/2024 1105 by Thu Dennison, RN  Outcome: Progressing  7/8/2024 1105 by Thu Dennison, RN  Outcome: Progressing  Goal: Blood Pressure in Desired Range  7/8/2024 1105 by Thu Dennison RN  Outcome: Progressing  7/8/2024 1105 by Thu Dennison RN  Outcome: Progressing  Goal: Maintenance of Osteoarthritis Symptom Control  7/8/2024 1105 by Thu Dennison, RN  Outcome: Progressing  7/8/2024 1105 by Thu Dennison, RN  Outcome: Progressing     Problem: Hip Arthroplasty  Goal: Optimal Coping  7/8/2024 1105 by Thu Dennison RN  Outcome: Progressing  7/8/2024 1105 by Thu Dennison, RN  Outcome: Progressing  Goal: Absence of Bleeding  7/8/2024 1105 by Thu Dennison, RN  Outcome: Progressing  7/8/2024 1105 by Thu Dennison, RN  Outcome: Progressing  Goal: Effective Bowel Elimination  7/8/2024 1105 by Thu Dennison RN  Outcome: Progressing  7/8/2024 1105 by Thu Dennison, RN  Outcome: Progressing  Goal: Fluid and Electrolyte Balance  7/8/2024 1105 by Thu Dennison, RN  Outcome: Progressing  7/8/2024 1105 by Thu Dennison RN  Outcome: Progressing  Goal: Optimal Functional Ability  7/8/2024 1105 by Thu Dennison, RN  Outcome: Progressing  7/8/2024 1105 by Thu Dennison, RN  Outcome: Progressing  Goal: Absence of Infection Signs and Symptoms  7/8/2024 1105 by Thu Dennison, RN  Outcome: Progressing  7/8/2024 1105 by Thu Dennison RN  Outcome: Progressing  Goal: Intact Neurovascular Status  7/8/2024 1105 by Thu Dennison RN  Outcome: Progressing  7/8/2024 1105 by Thu Dennison RN  Outcome: Progressing  Goal:  Anesthesia/Sedation Recovery  7/8/2024 1105 by Thu Dennison RN  Outcome: Progressing  7/8/2024 1105 by Thu Dennison RN  Outcome: Progressing  Goal: Acceptable Pain Control  7/8/2024 1105 by Thu Dennison RN  Outcome: Progressing  7/8/2024 1105 by Thu Dennison, RN  Outcome: Progressing  Goal: Nausea and Vomiting Relief  7/8/2024 1105 by Thu Dennison, RN  Outcome: Progressing  7/8/2024 1105 by Thu Dennison, RN  Outcome: Progressing  Goal: Effective Urinary Elimination  7/8/2024 1105 by Thu Dennison, RN  Outcome: Progressing  7/8/2024 1105 by Thu Dennison, RN  Outcome: Progressing  Goal: Effective Oxygenation and Ventilation  7/8/2024 1105 by Thu Dennison, RN  Outcome: Progressing  7/8/2024 1105 by Thu Dennison RN  Outcome: Progressing     Problem: Fall Injury Risk  Goal: Absence of Fall and Fall-Related Injury  7/8/2024 1105 by Thu Dennison RN  Outcome: Progressing  7/8/2024 1105 by Thu Dennison, RN  Outcome: Progressing

## 2024-07-08 NOTE — PT/OT/SLP EVAL
Physical Therapy Evaluation    Patient Name:  Lachelle Alanis   MRN:  [id number]    Recommendations:     Discharge Recommendations: Low Intensity Therapy   Discharge Equipment Recommendations: walker, rolling   Barriers to discharge:  Low BP     Assessment:     Lachelle Alanis is a 68 y.o. female admitted with a medical diagnosis of Primary osteoarthritis of left hip.  She presents with the following impairments/functional limitations: weakness, impaired endurance, impaired functional mobility, decreased lower extremity function, pain, decreased ROM, edema, orthopedic precautions .    Rehab Prognosis: Good; patient would benefit from acute skilled PT services to address these deficits and reach maximum level of function.    Recent Surgery: Procedure(s) (LRB):  ROBOTIC ARTHROPLASTY,HIP (Left) Day of Surgery    Plan:     During this hospitalization, patient to be seen BID to address the identified rehab impairments via gait training, therapeutic activities, therapeutic exercises and progress toward the following goals:    Plan of Care Expires:  07/12/24    Subjective     Chief Complaint: L hip pain  Patient/Family Comments/goals:   Pain/Comfort:  Location - Side 1: Left  Location 1: hip  Pain Addressed 1: Pre-medicate for activity, Reposition, Distraction, Cessation of Activity    Patients cultural, spiritual, Restorationism conflicts given the current situation:      Living Environment:  Pt lives in single story home with her 2 sons, only small threshold to [place].  Prior to admission, patients level of function was ind.  Equipment used at home: none.  DME owned (not currently used): none.  Upon discharge, patient will have assistance from sons.    Objective:     Communicated with nurse prior to session.  Patient found supine with peripheral IV, telemetry  upon PT entry to room.    General Precautions: Standard, fall  Orthopedic Precautions:LLE weight bearing as tolerated, LLE posterior precautions   Braces:     Respiratory Status: Room air    Exams:  RLE ROM: WFL  RLE Strength: WFL  LLE ROM:NT dt sx side  LLE Strength: NT dt sx side    Functional Mobility:  Bed Mobility:     Supine to Sit: contact guard assistance  Transfers:     Sit to Stand:  contact guard assistance with rolling walker  Toilet Transfer: contact guard assistance with  rolling walker  using  Step Transfer  Gait: Pt ambulated 180 ft w rw and CGA, using step through gait pattern at slow pace, no symptoms reported. BP: 82/45, HR 65 following ambulation, NSG notified and present at end of treatment          Treatment & Education:  Pt edu on total hip precautions, WB status, and importance of frequent mobility  Pt did not complete prehab prior to sx.    Patient left up in chair in reclined position with all lines intact, call button in reach, nurse notified, and nurse present.    GOALS:   Multidisciplinary Problems       Physical Therapy Goals          Problem: Physical Therapy    Goal Priority Disciplines Outcome Goal Variances Interventions   Physical Therapy Goal     PT, PT/OT Progressing     Description: Pt will improve functional independence by performing:    Bed mobility: SBA  Sit to stand: SBA with rolling walker  Bed to chair t/f: SBA with Stand Step  with rolling walker  Ambulation x 200'  with SBA with rolling walker  1 Step (Curb): Min A  with rolling walker  3 Steps: Min A  with B HR  Independent with total hip HEP                        History:     Past Medical History:   Diagnosis Date    Anemia     Anxiety     Arm pain, right     Cervical spondylosis with radiculopathy     Depression     Diabetes     Dupuytren's contracture     GERD (gastroesophageal reflux disease)     Hard of hearing     Headache     Heart palpitations     Hyperlipidemia     Hypertension     Irregular heart beat     Postoperative hypothyroidism     Shoulder pain     Sleep apnea     does not use CPAP    Unspecified osteoarthritis, unspecified site        Past Surgical  History:   Procedure Laterality Date    ABDOMINAL SURGERY      tummy tuck    ADENOIDECTOMY      Dont remember    AUGMENTATION OF BREAST      BLADDER SUSPENSION      BUNIONECTOMY Right     CARPAL TUNNEL RELEASE Bilateral     COLONOSCOPY      DECOMPRESSION OF ULNAR NERVE      ESOPHAGOGASTRODUODENOSCOPY      HIP SURGERY Right     HYSTERECTOMY      vaginal; partial    THYROIDECTOMY      due to goiter, Hoshimoto's disease    TONSILLECTOMY      TOTAL REPLACEMENT OF CERVICAL INTERVERTEBRAL DISC N/A 12/27/2022    Procedure: REPLACEMENT, INTERVERTEBRAL DISC, CERVICAL, TOTAL;  Surgeon: Daniel Melissa MD;  Location: [facility];  Service: Neurosurgery;  Laterality: N/A;  C5-6, C6-7 TOTAL DISC REPLACEMENT, ACDF IF NECESSARY  NTI //  JAY    TRIGGER FINGER RELEASE      left 3rd finger       Time Tracking:     PT Received On:    PT Start Time: 1458     PT Stop Time: 1528  PT Total Time (min): 30 min     Billable Minutes: Evaluation 30 07/08/2024

## 2024-07-08 NOTE — OP NOTE
OPERATIVE REPORT      Patient: Lachelle Alanis   : 1955    MRN: [id number]  Date: 2024      Surgeon: Alexey Wayne MD  Assistant: Johnny Reyes, PGY3, Leila Jain NP, CNFA.  Certified first assist was necessary as a skilled set of hands and was necessary for proper patient positioning as well as assistance with closure in place with multiple implants.  Preoperative Diagnosis:  Left hip primary osteoarthritis  Postoperative Diagnosis: Left Hip primary osteoarthritis  Procedure:  Left Elio total hip arthroplasty (88732)  Anesthesiologist: Renny Villa MD  OR Staff: Circulator: Shima Howard RN  Nurse Practitioner: Leila Jain FNP  Scrub Person: Karyna Bishop RN  Implants:   Implant Name Type Inv. Item Serial No.  Lot No. LRB No. Used Action   PIN BONE 4 X 140MM STERILE - [id number]  PIN BONE 4 X 140MM STERILE  [employer]  Left 1 Implanted and Explanted   PIN BONE 4 X 140MM STERILE - [id number]  PIN BONE 4 X 140MM STERILE  [employer]  Left 1 Implanted and Explanted   SHELL TRIDENT II CLUSTER 46MM - [id number]  SHELL TRIDENT II CLUSTER 46MM  [employer]. [id number] Left 1 Implanted   INSERT TRIDENT X3 0 DEG 32MM - [id number]  INSERT TRIDENT X3 0 DEG 32MM  [employer]. [id number] Left 1 Implanted   STEM FEM HIP 127DEG SZ3 30X102 - [id number]  STEM FEM HIP 127DEG SZ3 30X102  [employer]. [id number] Left 1 Implanted   HEAD DELTA V-40 32MM - [id number]  HEAD DELTA V-40 32MM  [employer]. [id number] Left 1 Implanted     EBL: 100  Complications: None  Disposition:  To PACU stable     Indications: Lachelle Alanis is a 68 y.o. female presenting with ongoing complaints of left hip pain.  Patient had tried and failed all conservative management including anti-inflammatories, activity modification, the use of a cane, home exercise program.  Despite this he continued to have  significant complaints of left hip pain.  Patient felt as though we reached a point of disability with regards to his left hip and would like to proceed with total hip arthroplasty.  The risks, benefits, alternatives to total hip arthroplasty were discussed in detail with the patient.  These include but are limited to infection, bleeding, scarring, need for revision surgery, included resolution of pain, DVT, PE, death.  Despite these risks, he elected to proceed with surgical intervention.  All questions answered to the patient's satisfaction.  No guarantees made.  The patient voiced understanding and written as well as verbal consent was obtained by myself prior to the procedure.    Procedure Note:  The patient was seen in the preoperative holding area.  He was marked and consented for surgery.  Again all questions were answered.  Taken the operating room was placed under anesthesia.  Placed in the right lateral decubitus position with all bony prominences well padded.  Left hip prepped draped normal sterile fashion.  A time-out was performed verifying correct patient site side and procedure.  All were in agreement.  Preoperative antibiotics were administered as well as TXA.    Three Shantz pins were attached percutaneously to the iliac crest.  imedos array was placed in appropriate position.  Standard direct superior approach to the hip was performed centered on the posterior 3rd of the greater trochanter.  Taken down through the skin and subcutaneous tissue down the fascia.  Fascia was split in line with the fibers.  Charnley retractor was placed.  Check point placed in the greater trochanter.  Piriformis tendon was identified.  Arthrotomy was made on the superior aspect of the piriformis and taken distally through the superior aspect of the short external rotators in a hockey-stick style fashion.  Hip was internally rotated and dislocated.  Sagittal saw was used to make an osteotomy 1-2 fingerbreadths  proximal to the level of lesser trochanter.  The retractors placed around the acetabulum and excellent exposure was had.  Removed the labrum as well pulvinar.  Registered the acetabulum using Elio protocol with excellent registration.  Brought in the Elio robot and reamed to a 46 Reamer.  We then impacted a 46 Trident II acetabulum component into position found to be down.   Acetabulum was impacted with 40° of inclination and 20° of anteversion.  We then impacted the liner into position and found it to be down.    Attention then turned to the femur.  Hip was internally rotated exposing the femur.  Retractors were placed.  Cookie cutter used to take out the lateral femoral neck.  Canal finder to open up the femoral canal.  We then broached sequentially up to a size 3 broach.  This came to a firm and solid stop with a change in pitch.  We then trialed at this point using a trial femoral neck and head.  Hip was taken through full range of motion and found to be stable with no anterior or posterior instability and good recreation of leg lengths as well as offset.  This was confirmed using measurements from the Elio robot.  We then dislocated the hip confirm stability of femoral broach.  We then opened the implant impacted the a 3 femoral component position.  It came down to the previous level of resection with again a firm solid stop. 32+0 Biolox head was opened and impacted onto the femoral trunnion after cleaning and drying the trunnion.  Hip was reduced and again taken to full range of motion.  Excellent recreation of leg length was felt and no anterior or posterior instability found.    Within turned our attention toward closure.  Wound was copiously irrigated with normal saline followed by Betadine lavage and more normal saline.  We closed the piriformis tendon as well as the short external rotators back to the greater trochanter through drill tunnels using #2 FiberWire sutures.  We injected our joint cocktail.   Fascia closed with #1 Stratafix suture in running fashion.  2-0 Monocryl on the subcutaneous tissue followed by skin closure.    All sponge and needle counts were correct at the end of the case.  I was present and participated in all key and critical aspects of the procedure.    Prognosis:  Patient arose from anesthesia without any issues.  Was transferred to recovery room stable condition.  Postoperatively weight-bearing as tolerated to the left lower extremity with appropriate hip precautions.  Plan to discharge home versus rehab pending physical therapy evaluation.    This note/OR report was created with the assistance of  voice recognition software or phone  dictation.  There may be transcription errors as a result of using this technology however minimal. Effort has been made to assure accuracy of transcription but any obvious errors or omissions should be clarified with the author of the document.

## 2024-07-08 NOTE — PLAN OF CARE
07/08/24 1326   Discharge Assessment   Assessment Type Discharge Planning Assessment   Source of Information patient;family   Does patient/caregiver understand observation status Yes   Communicated JULES with patient/caregiver Yes   Reason For Admission s/p THR   People in Home child(merary), adult   Do you expect to return to your current living situation? Yes   Do you have help at home or someone to help you manage your care at home? Yes   Who are your caregiver(s) and their phone number(s)? [employer]- [employer]   Prior to hospitilization cognitive status: Alert/Oriented   Current cognitive status: Alert/Oriented   Walking or Climbing Stairs Difficulty yes   Walking or Climbing Stairs ambulation difficulty, requires equipment   Dressing/Bathing Difficulty yes   Dressing/Bathing bathing difficulty, requires equipment   Equipment Currently Used at Home none   Readmission within 30 days? No   Patient currently being followed by outpatient case management? No   Do you currently have service(s) that help you manage your care at home? No   Do you take prescription medications? Yes   Do you have prescription coverage? Yes   Do you have any problems affording any of your prescribed medications? No   Is the patient taking medications as prescribed? yes   Who is going to help you get home at discharge? SONS   How do you get to doctors appointments? car, drives self   Are you on dialysis? No   Do you take coumadin? No   Discharge Plan A Home with family   Discharge Plan B Home with family   DME Needed Upon Discharge  walker, rolling;bedside commode;shower chair   Discharge Plan discussed with: Patient;Friend   Transition of Care Barriers Mobility     S/p THR. Spk w pt & Ann manley -- sons ( Zach & Braxton) to asst w homecare. Pt needs RW and BSC. Provider list given. Foc obtained.   Called/ faxed referral for dme to [facility]. They will deliver to hospital.   Called/ faxed referral for outpatient therapy to [place] .  They will contact pt with appointment date & time.   PCP: Dr Trevino      Patient DID participate in a pre-op exercise regimen

## 2024-07-08 NOTE — PROGRESS NOTES
Patient experiencing a delayed response to anesthesia and hypotension. Will convert to observation status, Will initiate our pre-emptive multimodal pain mgt protocol, provide post-anesthesia monitoring/vital signs and perform frequent pain assessments. Will plan for discharge once the patient is tolerating pain and pain medications appropriately and the patient has been cleared from a safety/mobility/medical standpoint.

## 2024-07-08 NOTE — ANESTHESIA POSTPROCEDURE EVALUATION
Anesthesia Post Evaluation    Patient: Lachelle Alanis    Procedure(s) Performed: Procedure(s) (LRB):  ROBOTIC ARTHROPLASTY,HIP (Left)    Final Anesthesia Type: general      Patient location during evaluation: PACU  Patient participation: Yes- Able to Participate  Level of consciousness: oriented and awake  Post-procedure vital signs: reviewed and stable  Pain management: adequate  Airway patency: patent    PONV status at discharge: No PONV  Anesthetic complications: no      Cardiovascular status: hemodynamically stable  Respiratory status: spontaneous ventilation and unassisted  Hydration status: euvolemic  Follow-up not needed.  Comments: [facility]        NEURO: Neuraxial block resolving      Vitals Value Taken Time   /69 07/08/24 1002   Temp 35.9 °C (96.6 °F) 07/08/24 0919   Pulse 58 07/08/24 1003   Resp 18 07/08/24 1015   SpO2 100 % 07/08/24 1003         Event Time   Out of Recovery 09:15:00         Pain/Jerri Score: Pain Rating Prior to Med Admin: 0 (7/8/2024  6:03 AM)  Jerri Score: 9 (7/8/2024  8:36 AM)

## 2024-07-08 NOTE — DISCHARGE INSTRUCTIONS
[facility]  [facility] [facility] [address]  [place] [address]  Phone [phone]       /      Fax [phone]  SURGEON: Dr. Wayne    After discharge, all questions or concerns should be handled at your surgeon's office ([phone]). If it is a weekend or after hours, you will get the surgeon on call.     Discharge Medications:    PAIN MANAGEMENT: Next Dose Available   Celebrex 200mg (Anti-Inflammatory) - ok to restart home regimen Restart home dosing   Tylenol/Acetaminophen 500mg- every 4 hours, around the clock (WHILE AWAKE) 2:00pm   Robaxin/Methocarbamol 500mg (Muscle Relaxer) - Every 6-8 hours AS NEEDED for muscle spasms, thigh pain or additional pain control If needed   Norco 7.5/325mg (Hydrocodone/Acetaminophen) (Pain Med) - 1/2 tablet every AS NEEDED for BREAKTHROUGH PAIN ONLY. Ok to use home supply. IF NEEDED   COMPLICATION PREVENTION MEDS: Next Dose DUE   Aspirin 81mg twice a day for 6 weeks post-op for blood clot prevention PM on 7/9/2024   Dulocolax 5mg  - daily while on narcotics and muscle relaxers for constipation prevention PM on 7/9/2024   NOZIN NASAL  - twice a day for 2 weeks or until supply runs out, whichever comes first (Infection prevention) PM on 7/9/2024   Duricef/Cefadroxil 500mg (Antibiotic) - twice a day for 7 days post-op for infection prevention PM on 7/9/2024   Take a medication once or twice a day while taking CELEBREX and Aspirin at the same time to prevent heartburn PM on 7/9/2024       Total Hip Replacement                                                                                                                                  PAIN MEDICATIONS/PAIN MANAGEMENT: (Use the medication log in your discharge packet to keep track of your medications)  Ok to restart your home dose of Celebrex (anti-inflammatory), as you were taking prior to surgery.   While on Celebrex and Aspirin (blood thinner) at the same time, take a medication once or twice  a day to protect your stomach (for the next 10 days) (Examples: Nexium, Prilosec, Prevacid, Omeprazole, Pepcid...etc). If you start having intolerable stomach issues, discontinue the Celebrex completely.   Aside from Celebrex, No other anti-inflammatories (Ibuprofen, Aleve, Motrin, Naproxen, Mobic/Meloxicam, Toradol/Ketorolac, Celebrex, Diclofenac/Voltaren...etc) for 2 weeks or until the wound is healed.    Tylenol/Acetaminophen 500mg every 4 hours, around the clock (WHILE AWAKE).     Ok to use home supply of Norco 7.5/325mg (Hydrocodone/Acetaminophen) (Pain pill) take 1/2 tablet as needed for SEVERE/BREAKTHOUGH PAIN MEDICATION. If you choose to use the Norco, skip your dose of Tylenol.       **NO MORE THAN 3000mg OF TYLENOL IN 24 HOURS**.     Robaxin/Methocarbamol 500mg (muscle relaxer)- you can take every 6-8 hours as needed for muscle spasms, thigh pain and stiffness, additional pain control or breakthrough pain medications. This medication is helpful for pain control while lessening your need for narcotics. Please reduce the use gradually as the pain and spasms lessen. DO NOT TAKE AT THE SAME TIME AS A PAIN PILL. YOU WILL BE BETTER SERVED WITH 2 HOURS BETWEEN PAIN PILL AND MUSCLE RELAXER.     **Other things that help with pain control is WALKING, COMPRESSION WRAP, ICE and ELEVATION!!**    BLOOD CLOT PREVENTION:   Aspirin 81mg (blood thinner) - twice a day(ONE IN THE MORNING AND ONE AT NIGHT) for 6 weeks for blood clot prevention. Start on the evening of 7/9/24. Stop on 8/20/24.  If you were taking Baby Aspirin 81mg prior to surgery, may return to you nighttime dosing AFTER 6 weeks - 8/21/24.    You need to continuing wearing your compression stocking (CARLOS Hose - ThromboEmbolic Disease Prevention Device) for the next 2-6 weeks post-op. It is ok to remove them for hygiene and at bedtime.   Hand wash and Dry. **If the swelling persists in the legs after you stop wearing the Carlos hose, continue to wear them until the  swelling decreases.**  REMOVE STOCKINGS AT LEAST DAILY FOR SKIN ASSESSMENT.   Do NOT let the stockings roll down, creating a tourniquet around the back of your knee. If you need to, leave the excess at the bottom of the stocking.   The best thing you can do to prevent blood clots is to walk around as much as possible, AT LEAST EVERY 1-2 HOURS.       CONSTIPATION PREVENTION:   Restart daily Dulcolax for constipation prevention.   May also add Miralax or Senokot S/Golria-Colace and Stool softeners EVERY DAY while on pain meds.  Use other more aggressive over the counter LAXATIVES as needed for constipation (Examples: Linzess, Milk of Magnesia, Dulcolax tabs or suppository, Magnesium Citrate, Fleet's Enema...etc.)   Drink lots of water.  Increase Fiber in diet.  Increase walking distance each day  DO NOT GO MORE THAN 2 DAYS WITHOUT HAVING A BOWEL MOVEMENT!    ACTIVITY:   Weight bearing precautions as follows:  FULL weight bearing to operative leg with walker.   HIP PRECAUTIONS:  NO Twisting  NO Leaning past 90 degrees  NO Crossing legs      DO NOT TAKE YOURSELF OFF OF THE WALKER TOO SOON. ALLOW YOUR OUTPATIENT THERAPIST or SURGEON TO GUIDE YOU.   Change positions often throughout the day.   Walk around at least every 1-2 hours while awake.   No heavy lifting, pulling, pushing or straining.  Ice the Hip and thigh AS MUCH AS POSSIBLE  Outpatient Physical Therapy - Bring prescription to clinic of choice as soon as possible.        WOUND CARE:     Dry dressing changes every other day and as needed for soiling for 14 days. Start THURSDAY. You may need to change the dressing daily if the wound is draining. Switch to every other day as soon as possible. NOTIFY MD OF EXCESSIVE WOUND DRAINAGE.     DO NOT WET WOUND or apply any ointments, creams, lotions or antiseptics.  Ace wrap - apply your compression stocking and apply the ace wrap where the stocking stops for extra added compression to the knee and thigh.   Ok to shower  before then if able to keep wound from getting wet (plastic barrier, saran wrap or cling wrap and tape).   DO NOT TOUCH INCISION      URINARY RETENTION:  If you start having difficulty urinating, decrease the use of Pain pills and muscle relaxers and notify your primary care doctor.     PNEUMONIA PREVENTION:  Stay out of bed as much as possible and walk around every 1-2 hours.  Continue breathing exercises (Incentive Spirometry) every 1-2 hours while mobility is limited and while you are on pain pills.    FALL PREVENTION:  Wear sturdy shoes that fit well - Wearing shoes with high heels or slippery soles, or shoes that are too loose, can lead to falls. Walking around in bare feet, or only socks, can also increase your risk of falling.  Use walker as long as your surgeon and therapist recommend it  Use good lighting and  throw rugs, electrical cords, furniture and clutter (anything than can cause you to trip at home.   Non-slip rug in bathroom or shower    INFECTION PREVENTION:  NOZIN ANTISEPTIC NASAL  - twice a day for 2 weeks or until supply runs out, whichever comes first. Shake bottle well, saturate cotton swab with 4 drops of antiseptic solution. Swab right nostril rim 6-8 times clockwise and counterclockwise. Take swab out, apply 2 more drops then swab left nostril rim 6-8 times clockwise and counterclockwise.   Duricef/Cefadroxil 500mg (Antibiotic) -  take twice a day for 7 days to prevent infection  Proper handwashing before and after dressing changes. Do not wet the wound. Wound care instructions as written above. NOTIFY MD OF EXCESSIVE WOUND DRAINAGE.  No alcohol, smoking or tobacco products  Pets should not be allowed around the wound or the dressing.   Treat UTI and skin infections as soon as possible.  Pre-medicate with antibiotics prior to dental or surgical procedures.   If you are diabetic, MAINTAIN GOOD BLOOD SUGAR CONTROL (Below 150) DURING YOUR RECOVERY. If you see high numbers,  notify your primary care doctor.     Call your SURGEON'S OFFICE ([phone]) if you experience the following signs and symptoms of infection:   Unusual redness, swelling, excessive, cloudy or foul smelling drainage at the incision site.   Persistent low grade temp OR a temp greater than 102 F, unrelieved by Tylenol  Pain at surgical site, unrelieved by pain meds    Warning signs of a blood clot in your leg: (CALL YOUR SURGEON)  New onset or increasing pain in calf, new onset tenderness or redness above or below the knee or increasing swelling of your calf, ankle, or foot.  Warning signs that a blood clot has traveled to your lungs: (REPORT TO THE ER/CALL 753)  Sudden or increase in Shortness of breath, sudden onset of chest pains, or  Localized chest pain with coughing.       IF ANY ISSUES ARISE AND YOU FEEL THE NEED TO CALL YOUR PRIMARY CARE DOCTOR, PLEASE LET YOUR SURGEON KNOW AS WELL.     For emergencies, please report to OUR ([facility]) Emergency department and tell them to call YOUR SURGEON at [phone].     BEFORE MAKING ANY CHANGES TO THE MEDICAL CARE PLAN OR GOING TO THE EMERGENCY ROOM, PLEASE CONTACT THE SURGEON.    After discharge, all questions or concerns should be handled at your surgeon's office ([phone]). If it is a weekend or after hours, you will get the surgeon on call.

## 2024-07-08 NOTE — TRANSFER OF CARE
Anesthesia Transfer of Care Note    Patient: Lachelle Alanis    Procedure(s) Performed: Procedure(s) (LRB):  ROBOTIC ARTHROPLASTY,HIP (Left)    Patient location: PACU    Anesthesia Type: spinal    Transport from OR: Transported from OR on room air with adequate spontaneous ventilation    Post pain: adequate analgesia    Post assessment: no apparent anesthetic complications and tolerated procedure well    Post vital signs: stable    Level of consciousness: awake and alert    Nausea/Vomiting: no nausea/vomiting    Complications: none    Transfer of care protocol was followed    Last vitals: Visit Vitals  BP (!) 148/43 (BP Location: Left arm, Patient Position: Lying)   Pulse (!) 58   Temp 36.2 °C (97.1 °F) (Tympanic)   Resp 20   Ht 5' (1.524 m)   Wt 66.2 kg (145 lb 15.1 oz)   LMP  (LMP Unknown)   SpO2 99%   Breastfeeding No   BMI 28.50 kg/m²

## 2024-07-08 NOTE — NURSING
Nurses Note -- 4 Eyes      7/8/2024   1:15 PM      Skin assessed during: Admit      [x] No Altered Skin Integrity Present    [x]Prevention Measures Documented      [] Yes- Altered Skin Integrity Present or Discovered   [] LDA Added if Not in Epic (Describe Wound)   [] New Altered Skin Integrity was Present on Admit and Documented in LDA   [] Wound Image Taken    Wound Care Consulted? No    Attending Nurse:  Thu Starr RN/Staff Member:   Claritza

## 2024-07-09 VITALS
SYSTOLIC BLOOD PRESSURE: 112 MMHG | DIASTOLIC BLOOD PRESSURE: 62 MMHG | RESPIRATION RATE: 20 BRPM | HEIGHT: 60 IN | HEART RATE: 77 BPM | WEIGHT: 145.94 LBS | BODY MASS INDEX: 28.65 KG/M2 | TEMPERATURE: 99 F | OXYGEN SATURATION: 94 %

## 2024-07-09 LAB
ABO + RH BLD: NORMAL
ABO + RH BLD: NORMAL
ANION GAP SERPL CALC-SCNC: 6 MEQ/L
BLD PROD TYP BPU: NORMAL
BLD PROD TYP BPU: NORMAL
BLOOD UNIT EXPIRATION DATE: NORMAL
BLOOD UNIT EXPIRATION DATE: NORMAL
BLOOD UNIT TYPE CODE: 5100
BLOOD UNIT TYPE CODE: 5100
BUN SERPL-MCNC: 23.2 MG/DL (ref 9.8–20.1)
CALCIUM SERPL-MCNC: 8.6 MG/DL (ref 8.4–10.2)
CHLORIDE SERPL-SCNC: 111 MMOL/L (ref 98–107)
CO2 SERPL-SCNC: 25 MMOL/L (ref 23–31)
CREAT SERPL-MCNC: 1.1 MG/DL (ref 0.55–1.02)
CREAT/UREA NIT SERPL: 21
CROSSMATCH INTERPRETATION: NORMAL
CROSSMATCH INTERPRETATION: NORMAL
DISPENSE STATUS: NORMAL
DISPENSE STATUS: NORMAL
ERYTHROCYTE [DISTWIDTH] IN BLOOD BY AUTOMATED COUNT: 14 % (ref 11.5–17)
GFR SERPLBLD CREATININE-BSD FMLA CKD-EPI: 55 ML/MIN/1.73/M2
GLUCOSE SERPL-MCNC: 174 MG/DL (ref 82–115)
HCT VFR BLD AUTO: 32.7 % (ref 37–47)
HGB BLD-MCNC: 10.7 G/DL (ref 12–16)
MCH RBC QN AUTO: 29.4 PG (ref 27–31)
MCHC RBC AUTO-ENTMCNC: 32.7 G/DL (ref 33–36)
MCV RBC AUTO: 89.8 FL (ref 80–94)
NRBC BLD AUTO-RTO: 0 %
PLATELET # BLD AUTO: 81 X10(3)/MCL (ref 130–400)
PMV BLD AUTO: 12.1 FL (ref 7.4–10.4)
POCT GLUCOSE: 111 MG/DL (ref 70–110)
POCT GLUCOSE: 166 MG/DL (ref 70–110)
POCT GLUCOSE: 202 MG/DL (ref 70–110)
POTASSIUM SERPL-SCNC: 4.3 MMOL/L (ref 3.5–5.1)
RBC # BLD AUTO: 3.64 X10(6)/MCL (ref 4.2–5.4)
SODIUM SERPL-SCNC: 142 MMOL/L (ref 136–145)
UNIT NUMBER: NORMAL
UNIT NUMBER: NORMAL
WBC # BLD AUTO: 5.5 X10(3)/MCL (ref 4.5–11.5)

## 2024-07-09 PROCEDURE — 63600175 PHARM REV CODE 636 W HCPCS: Performed by: NURSE PRACTITIONER

## 2024-07-09 PROCEDURE — 96376 TX/PRO/DX INJ SAME DRUG ADON: CPT | Mod: 59

## 2024-07-09 PROCEDURE — 96372 THER/PROPH/DIAG INJ SC/IM: CPT | Performed by: ORTHOPAEDIC SURGERY

## 2024-07-09 PROCEDURE — 97110 THERAPEUTIC EXERCISES: CPT | Mod: CQ

## 2024-07-09 PROCEDURE — 97530 THERAPEUTIC ACTIVITIES: CPT | Mod: CQ

## 2024-07-09 PROCEDURE — 97166 OT EVAL MOD COMPLEX 45 MIN: CPT

## 2024-07-09 PROCEDURE — 85027 COMPLETE CBC AUTOMATED: CPT | Performed by: ORTHOPAEDIC SURGERY

## 2024-07-09 PROCEDURE — 96367 TX/PROPH/DG ADDL SEQ IV INF: CPT

## 2024-07-09 PROCEDURE — G0378 HOSPITAL OBSERVATION PER HR: HCPCS

## 2024-07-09 PROCEDURE — 25000003 PHARM REV CODE 250: Performed by: ORTHOPAEDIC SURGERY

## 2024-07-09 PROCEDURE — 96366 THER/PROPH/DIAG IV INF ADDON: CPT | Mod: 59

## 2024-07-09 PROCEDURE — 63600175 PHARM REV CODE 636 W HCPCS: Performed by: ORTHOPAEDIC SURGERY

## 2024-07-09 PROCEDURE — P9016 RBC LEUKOCYTES REDUCED: HCPCS | Performed by: NURSE PRACTITIONER

## 2024-07-09 PROCEDURE — 80048 BASIC METABOLIC PNL TOTAL CA: CPT | Performed by: ORTHOPAEDIC SURGERY

## 2024-07-09 PROCEDURE — 25000003 PHARM REV CODE 250: Performed by: NURSE PRACTITIONER

## 2024-07-09 PROCEDURE — 36415 COLL VENOUS BLD VENIPUNCTURE: CPT | Performed by: ORTHOPAEDIC SURGERY

## 2024-07-09 RX ORDER — MUPIROCIN 20 MG/G
OINTMENT TOPICAL 2 TIMES DAILY
Qty: 30 G | Refills: 0 | Status: SHIPPED | OUTPATIENT
Start: 2024-07-09 | End: 2024-07-19

## 2024-07-09 RX ORDER — NAPROXEN SODIUM 220 MG/1
81 TABLET, FILM COATED ORAL DAILY
Start: 2024-07-09

## 2024-07-09 RX ORDER — ACETAMINOPHEN 500 MG
500 TABLET ORAL EVERY 4 HOURS
Start: 2024-07-09 | End: 2024-07-23

## 2024-07-09 RX ORDER — BISACODYL 5 MG
5 TABLET, DELAYED RELEASE (ENTERIC COATED) ORAL DAILY
Start: 2024-07-09

## 2024-07-09 RX ORDER — METHOCARBAMOL 500 MG/1
500 TABLET, FILM COATED ORAL EVERY 8 HOURS PRN
Qty: 42 TABLET | Refills: 0 | Status: SHIPPED | OUTPATIENT
Start: 2024-07-09 | End: 2024-07-23

## 2024-07-09 RX ADMIN — FAMOTIDINE 20 MG: 20 TABLET, FILM COATED ORAL at 10:07

## 2024-07-09 RX ADMIN — KETOROLAC TROMETHAMINE 15 MG: 30 INJECTION, SOLUTION INTRAMUSCULAR at 05:07

## 2024-07-09 RX ADMIN — ACETAMINOPHEN 500 MG: 500 TABLET ORAL at 10:07

## 2024-07-09 RX ADMIN — ACETAMINOPHEN 500 MG: 500 TABLET ORAL at 01:07

## 2024-07-09 RX ADMIN — METOCLOPRAMIDE 10 MG: 5 INJECTION, SOLUTION INTRAMUSCULAR; INTRAVENOUS at 11:07

## 2024-07-09 RX ADMIN — FLECAINIDE ACETATE TABLET 50 MG: 50 TABLET ORAL at 10:07

## 2024-07-09 RX ADMIN — METOCLOPRAMIDE 10 MG: 5 INJECTION, SOLUTION INTRAMUSCULAR; INTRAVENOUS at 05:07

## 2024-07-09 RX ADMIN — INSULIN ASPART 2 UNITS: 100 INJECTION, SOLUTION INTRAVENOUS; SUBCUTANEOUS at 11:07

## 2024-07-09 RX ADMIN — LEVOTHYROXINE SODIUM 137 MCG: 25 TABLET ORAL at 05:07

## 2024-07-09 RX ADMIN — KETOROLAC TROMETHAMINE 15 MG: 30 INJECTION, SOLUTION INTRAMUSCULAR at 01:07

## 2024-07-09 RX ADMIN — ASPIRIN 81 MG 81 MG: 81 TABLET ORAL at 10:07

## 2024-07-09 RX ADMIN — VANCOMYCIN HYDROCHLORIDE 1000 MG: 1 INJECTION, POWDER, LYOPHILIZED, FOR SOLUTION INTRAVENOUS at 01:07

## 2024-07-09 RX ADMIN — CEFAZOLIN 2 G: 2 INJECTION, POWDER, FOR SOLUTION INTRAMUSCULAR; INTRAVENOUS at 02:07

## 2024-07-09 RX ADMIN — ACETAMINOPHEN 500 MG: 500 TABLET ORAL at 05:07

## 2024-07-09 RX ADMIN — SENNOSIDES AND DOCUSATE SODIUM 2 TABLET: 50; 8.6 TABLET ORAL at 10:07

## 2024-07-09 RX ADMIN — METOCLOPRAMIDE 10 MG: 5 INJECTION, SOLUTION INTRAMUSCULAR; INTRAVENOUS at 01:07

## 2024-07-09 RX ADMIN — KETOROLAC TROMETHAMINE 15 MG: 30 INJECTION, SOLUTION INTRAMUSCULAR at 11:07

## 2024-07-09 RX ADMIN — DOCUSATE SODIUM 200 MG: 100 CAPSULE, LIQUID FILLED ORAL at 05:07

## 2024-07-09 RX ADMIN — SERTRALINE HYDROCHLORIDE 100 MG: 50 TABLET ORAL at 10:07

## 2024-07-09 NOTE — PLAN OF CARE
RW delivered from [employer].   Pt paid OOP cost for bsc ( $69)-- pt will pickup. Pt will borrow TTB from family member.

## 2024-07-09 NOTE — PLAN OF CARE
Problem: Adult Inpatient Plan of Care  Goal: Plan of Care Review  7/9/2024 1232 by Frannie Arce RN  Outcome: Met  7/9/2024 1231 by Frannie Arce RN  Outcome: Progressing  Goal: Patient-Specific Goal (Individualized)  7/9/2024 1232 by Frannie Arce RN  Outcome: Met  7/9/2024 1231 by Frannie Arce RN  Outcome: Progressing  Goal: Absence of Hospital-Acquired Illness or Injury  7/9/2024 1232 by Frannie Arce, RN  Outcome: Met  7/9/2024 1231 by Frannie Arce RN  Outcome: Progressing  Goal: Optimal Comfort and Wellbeing  7/9/2024 1232 by Frannie Arce RN  Outcome: Met  7/9/2024 1231 by Frannie Arce RN  Outcome: Progressing  Goal: Readiness for Transition of Care  7/9/2024 1232 by Frannie Arce, RN  Outcome: Met  7/9/2024 1231 by Frannie Arce RN  Outcome: Progressing     Problem: Wound  Goal: Optimal Coping  7/9/2024 1232 by Frannie Arce RN  Outcome: Met  7/9/2024 1231 by Frannie Arce RN  Outcome: Progressing  Goal: Optimal Functional Ability  7/9/2024 1232 by Frannie Arce, RN  Outcome: Met  7/9/2024 1231 by Frannie Arce RN  Outcome: Progressing  Goal: Absence of Infection Signs and Symptoms  7/9/2024 1232 by Frannie Arce, RN  Outcome: Met  7/9/2024 1231 by Frannie Arce RN  Outcome: Progressing  Goal: Improved Oral Intake  7/9/2024 1232 by Frannie Arce, RN  Outcome: Met  7/9/2024 1231 by Frannie Arce RN  Outcome: Progressing  Goal: Optimal Pain Control and Function  7/9/2024 1232 by Frannie Arce, RN  Outcome: Met  7/9/2024 1231 by Frannie Arce, RN  Outcome: Progressing  Goal: Skin Health and Integrity  7/9/2024 1232 by Frannie Arce, RN  Outcome: Met  7/9/2024 1231 by Frannie Arce RN  Outcome: Progressing  Goal: Optimal Wound Healing  7/9/2024 1232 by Frannie Arce RN  Outcome: Met  7/9/2024 1231 by Frannie Arce, RN  Outcome: Progressing     Problem: Adult Inpatient Plan of  Care  Goal: Plan of Care Review  7/9/2024 1232 by Frannie Arce RN  Outcome: Met  7/9/2024 1231 by Frannie Arce RN  Outcome: Progressing  Goal: Patient-Specific Goal (Individualized)  7/9/2024 1232 by Frannie Arce RN  Outcome: Met  7/9/2024 1231 by Frannie Arce, RN  Outcome: Progressing  Goal: Absence of Hospital-Acquired Illness or Injury  7/9/2024 1232 by Frannie Arce, RN  Outcome: Met  7/9/2024 1231 by Frannie Arce RN  Outcome: Progressing  Goal: Optimal Comfort and Wellbeing  7/9/2024 1232 by Frannie Arce RN  Outcome: Met  7/9/2024 1231 by Frannie Arce RN  Outcome: Progressing  Goal: Readiness for Transition of Care  7/9/2024 1232 by Frannie Arce, RN  Outcome: Met  7/9/2024 1231 by Frannie Arce RN  Outcome: Progressing     Problem: Wound  Goal: Optimal Coping  7/9/2024 1232 by Frannie Arce, RN  Outcome: Met  7/9/2024 1231 by Frannie Arce RN  Outcome: Progressing  Goal: Optimal Functional Ability  7/9/2024 1232 by Frannie Arce, RN  Outcome: Met  7/9/2024 1231 by Frannie Arce RN  Outcome: Progressing  Goal: Absence of Infection Signs and Symptoms  7/9/2024 1232 by Frannie Arce, RN  Outcome: Met  7/9/2024 1231 by Frannie Arce, RN  Outcome: Progressing  Goal: Improved Oral Intake  7/9/2024 1232 by Frannie Arce, RN  Outcome: Met  7/9/2024 1231 by Frannie Arce RN  Outcome: Progressing  Goal: Optimal Pain Control and Function  7/9/2024 1232 by Frannie Arce, RN  Outcome: Met  7/9/2024 1231 by Frannie Arce, RN  Outcome: Progressing  Goal: Skin Health and Integrity  7/9/2024 1232 by Frannie Arce, RN  Outcome: Met  7/9/2024 1231 by Frannie Arce RN  Outcome: Progressing  Goal: Optimal Wound Healing  7/9/2024 1232 by Frannie Arce RN  Outcome: Met  7/9/2024 1231 by Frannie Arce RN  Outcome: Progressing     Problem: Infection  Goal: Absence of Infection Signs and  Symptoms  7/9/2024 1232 by Frannie Arce RN  Outcome: Met  7/9/2024 1231 by Frnanie Arce, RN  Outcome: Progressing     Problem: Skin Injury Risk Increased  Goal: Skin Health and Integrity  7/9/2024 1232 by Frannie Arce, RN  Outcome: Met  7/9/2024 1231 by Frannie Arce, RN  Outcome: Progressing     Problem: Comorbidity Management  Goal: Maintenance of Behavioral Health Symptom Control  7/9/2024 1232 by Frannie Arce, RN  Outcome: Met  7/9/2024 1231 by Frannie Arce RN  Outcome: Progressing  Goal: Blood Pressure in Desired Range  7/9/2024 1232 by Frannie Arce, RN  Outcome: Met  7/9/2024 1231 by Frannie Arce RN  Outcome: Progressing  Goal: Maintenance of Osteoarthritis Symptom Control  7/9/2024 1232 by Frannie Arce, RN  Outcome: Met  7/9/2024 1231 by Frannie Arce, RN  Outcome: Progressing     Problem: Hip Arthroplasty  Goal: Optimal Coping  7/9/2024 1232 by Frannie Arce, RN  Outcome: Met  7/9/2024 1231 by Frannie Arce, RN  Outcome: Progressing  Goal: Absence of Bleeding  7/9/2024 1232 by Frannie Arce, RN  Outcome: Met  7/9/2024 1231 by Frannie Arce, RN  Outcome: Progressing  Goal: Effective Bowel Elimination  7/9/2024 1232 by Frannie Arce, RN  Outcome: Met  7/9/2024 1231 by Frannie Arce, RN  Outcome: Progressing  Goal: Fluid and Electrolyte Balance  7/9/2024 1232 by Frannie Arce, RN  Outcome: Met  7/9/2024 1231 by Frannie Arce, RN  Outcome: Progressing  Goal: Optimal Functional Ability  7/9/2024 1232 by Frannie Arce, RN  Outcome: Met  7/9/2024 1231 by Frannie Arce, RN  Outcome: Progressing  Goal: Absence of Infection Signs and Symptoms  7/9/2024 1232 by Frannie Arce, RN  Outcome: Met  7/9/2024 1231 by Frannie Arce RN  Outcome: Progressing  Goal: Intact Neurovascular Status  7/9/2024 1232 by Frannie Arce RN  Outcome: Met  7/9/2024 1231 by Frannie Arce RN  Outcome:  Progressing  Goal: Anesthesia/Sedation Recovery  7/9/2024 1232 by Frannie Arce, RN  Outcome: Met  7/9/2024 1231 by Frannie Arce RN  Outcome: Progressing  Goal: Acceptable Pain Control  7/9/2024 1232 by Frannie Arce, RN  Outcome: Met  7/9/2024 1231 by Frannie Arce, RN  Outcome: Progressing  Goal: Nausea and Vomiting Relief  7/9/2024 1232 by Frannie Arce, RN  Outcome: Met  7/9/2024 1231 by Frannie Arce RN  Outcome: Progressing  Goal: Effective Urinary Elimination  7/9/2024 1232 by Frannie Arce, RN  Outcome: Met  7/9/2024 1231 by Frannie Arce RN  Outcome: Progressing  Goal: Effective Oxygenation and Ventilation  7/9/2024 1232 by Frannie Arce, RN  Outcome: Met  7/9/2024 1231 by Frannie Arce, RN  Outcome: Progressing     Problem: Fall Injury Risk  Goal: Absence of Fall and Fall-Related Injury  7/9/2024 1232 by Frannie Arce RN  Outcome: Met  7/9/2024 1231 by Frannie Arce RN  Outcome: Progressing     Problem: Diabetes Comorbidity  Goal: Blood Glucose Level Within Targeted Range  7/9/2024 1232 by Frannie Arce, RN  Outcome: Met  7/9/2024 1231 by Frannie Arce RN  Outcome: Progressing

## 2024-07-09 NOTE — PT/OT/SLP EVAL
Occupational Therapy   Evaluation and Discharge Note    Name: Lachelle Alanis  MRN: [id number]  Admitting Diagnosis: Primary osteoarthritis of left hip  Recent Surgery: Procedure(s) (LRB):  ROBOTIC ARTHROPLASTY,HIP (Left) 1 Day Post-Op    Recommendations:     Discharge Recommendations: Low Intensity Therapy  Discharge Equipment Recommendations: walker, rolling, bedside commode, bath bench  Barriers to discharge:  None    Assessment:     Lachelle Alanis is a 68 y.o. female with a medical diagnosis of Primary osteoarthritis of left hip. At this time, patient is functioning at their prior level of function and does not require further acute OT services.     Plan:     During this hospitalization, patient does not require further acute OT services.  Please re-consult if situation changes.    Plan of Care Reviewed with: patient    Subjective     Chief Complaint: no complaints  Patient/Family Comments/goals: go home    Occupational Profile:  Living Environment: h with sons, small threshold to enter. Has tub/showers with [facility]  Previous level of function: independent  Equipment Used at home: none  Assistance upon Discharge: antolin    Pain/Comfort:  Pain Rating 1: 0/10    Patients cultural, spiritual, Anabaptism conflicts given the current situation: no    Objective:     Communicated with: NSG prior to session.  Patient found up in chair with peripheral IV upon OT entry to room.    General Precautions: Standard, fall  Orthopedic Precautions: LLE weight bearing as tolerated, LLE posterior precautions  Braces: N/A  Respiratory Status: Room air     Occupational Performance:    Functional Mobility/Transfers:  Patient completed Sit <> Stand Transfer with supervision  with  rolling walker   Patient completed Toilet Transfer Step Transfer technique with supervision with  rolling walker  Patient completed Tub Transfer Step Transfer technique with supervision with rolling walker and bath bench  Patient completed car transfer step  transfer technique with supervision with rolling walker  Functional Mobility: Pt performed FM in hallway with RW and SBA, tolerated ~100 feet with no LOBs or rest breaks needed. Steady pace.     Activities of Daily Living:  Upper Body Dressing: independence    Lower Body Dressing: stand by assistance with use of reacher and sock aid to doff/don socks and don underwear. Pt able to demonstrate proper technique with AE following OT instruction and demonstration.   Toileting: supervision void, +urine    Cognitive/Visual Perceptual:  Cognitive/Psychosocial Skills:     -       Oriented to: Person, Place, Time, and Situation   -       Follows Commands/attention:Follows multistep  commands  -       Communication: clear/fluent  -       Memory: No Deficits noted  -       Safety awareness/insight to disability: intact   -       Mood/Affect/Coping skills/emotional control: Appropriate to situation and Cooperative  Visual/Perceptual:      -Intact wears corrective lenses    Physical Exam:  Motor Planning: -       intact  Upper Extremity Range of Motion:     -       Right Upper Extremity: WNL  -       Left Upper Extremity: WNL  Upper Extremity Strength: -       Right Upper Extremity: WNL  -       Left Upper Extremity: WNL    AMPAC 6 Click ADL:  AMPAC Total Score:      Treatment & Education:  Educated on roles and goals of occupational therapy, POC for acute stay. Reviewed safe transfer techniques as well as posterior hip precautions, pt able to recall 2/3 (needed cues for no crossing)    Patient left up in chair with all lines intact, call button in reach, and TAO Kathleen notified    GOALS:   Multidisciplinary Problems       Occupational Therapy Goals       Not on file              Multidisciplinary Problems (Resolved)          Problem: Occupational Therapy    Goal Priority Disciplines Outcome Interventions   Occupational Therapy Goal   (Resolved)     OT, PT/OT Met    Description: Pt to perform car transfer with SBA and RW by d/cMoshe  MET                       History:     Past Medical History:   Diagnosis Date    Anemia     Anxiety     Arm pain, right     Cervical spondylosis with radiculopathy     Depression     Diabetes     Dupuytren's contracture     GERD (gastroesophageal reflux disease)     Hard of hearing     Headache     Heart palpitations     Hyperlipidemia     Hypertension     Irregular heart beat     Postoperative hypothyroidism     Shoulder pain     Sleep apnea     does not use CPAP    Unspecified osteoarthritis, unspecified site          Past Surgical History:   Procedure Laterality Date    ABDOMINAL SURGERY      tummy tuck    ADENOIDECTOMY      Dont remember    AUGMENTATION OF BREAST      BLADDER SUSPENSION      BUNIONECTOMY Right     CARPAL TUNNEL RELEASE Bilateral     COLONOSCOPY      DECOMPRESSION OF ULNAR NERVE      ESOPHAGOGASTRODUODENOSCOPY      HIP SURGERY Right     HYSTERECTOMY      vaginal; partial    ROBOTIC ARTHROPLASTY, HIP Left 7/8/2024    Procedure: ROBOTIC ARTHROPLASTY,HIP;  Surgeon: Alexey Wayne MD;  Location: [facility];  Service: Orthopedics;  Laterality: Left;    THYROIDECTOMY      due to goiter, Hoshimoto's disease    TONSILLECTOMY      TOTAL REPLACEMENT OF CERVICAL INTERVERTEBRAL DISC N/A 12/27/2022    Procedure: REPLACEMENT, INTERVERTEBRAL DISC, CERVICAL, TOTAL;  Surgeon: Daniel Melissa MD;  Location: [facility];  Service: Neurosurgery;  Laterality: N/A;  C5-6, C6-7 TOTAL DISC REPLACEMENT, ACDF IF NECESSARY  NTI //  JAY    TRIGGER FINGER RELEASE      left 3rd finger       Time Tracking:     OT Date of Treatment: 07/09/24  OT Start Time: 0917  OT Stop Time: 0950  OT Total Time (min): 33 min    Billable Minutes:Evaluation 33    7/9/2024

## 2024-07-09 NOTE — PLAN OF CARE
Problem: Physical Therapy  Goal: Physical Therapy Goal  Description: Pt will improve functional independence by performing:    Bed mobility: SBA  Sit to stand: SBA with rolling walker-met  Bed to chair t/f: SBA with Stand Step  with rolling walker-met  Ambulation x 200'  with SBA with rolling walker-met  1 Step (Curb): Min A  with rolling walker-met  3 Steps: Min A  with B HR-met  Independent with total hip HEP   Outcome: Progressing

## 2024-07-09 NOTE — PROGRESS NOTES
No acute events overnight.  Pain controlled.  Resting in bed.     Vital Signs  Temp: 98.9 °F (37.2 °C)  Temp Source: Oral  Pulse: 77  Heart Rate Source: Monitor  Resp: 20  SpO2: (!) 94 %  Pulse Oximetry Type: Intermittent  Flow (L/min) (Oxygen Therapy): 2  Device (Oxygen Therapy): room air  BP: (!) 96/58  BP Location: Left arm  BP Method: Automatic  Patient Position: Lying  Height and Weight  Height: 5' (152.4 cm)  Height Method: Stated  Weight: 66.2 kg (145 lb 15.1 oz)  Weight Method: Standard Scale  BSA (Calculated - sq m): 1.67 sq meters  BMI (Calculated): 28.5  Weight in (lb) to have BMI = 25: 127.7]    +FHL/EHL  BCR distally  Dressing c/d/i  SILT distally    Recent Lab Results  (Last 5 results in the past 24 hours)        07/08/24  1757   07/08/24  1607   07/08/24  1323   07/08/24  1039   07/08/24  0823        Unit Blood Type Code 5100                5100  [P]               Unit Expiration [id number]                [id number]  [P]               ABO and RH               Crossmatch Interpretation Compatible                Compatible  [P]               DISPENSE STATUS Transfused                Issued  [P]               Group & Rh O POS               Hematocrit 23.4               Hemoglobin 7.4               Indirect Aleksadnr GEL NEG               POCT Glucose   169     164   143       Product Code [id number]                [id number]  [P]               Specimen Outdate 07/11/2024 23:59               Unit ABO/Rh O POS                O POS  [P]               UNIT NUMBER [id number]                [id number]  [P]               Vancomycin-Trough     12.2                                   [P] - Preliminary Result               A/P:  Status post KULWANT  Pain controlled  Overall patient doing well.  Therapy for mobility and ambulation.  ASA for DVT PPx  Transfusion this AM for ABLA

## 2024-07-09 NOTE — PLAN OF CARE
Problem: Occupational Therapy  Goal: Occupational Therapy Goal  Description: Pt to perform car transfer with SBA and RW by d/c. MET  Outcome: Met

## 2024-07-09 NOTE — NURSING
Nurse Note:   Pt left in wc with staff member. Pt stable,no distress. Coverlets given. Pt to  [facility] from [place]. Pt advised to call MD office with any questions/concerns.     7/9/2024   12:35 PM      Perception of Care - Joint Replacement Population Total Joint Surgery List: HIP    Patient able to verbalize one way to treat/prevent SWELLING at home   [x] Yes   [] No   [] Further Education Provided        Attending Nurse:  Frannie

## 2024-07-09 NOTE — PT/OT/SLP PROGRESS
"Physical Therapy Treatment    Patient Name:  Lachelle Alanis   MRN:  [id number]    Recommendations:     Discharge Recommendations: Low Intensity Therapy  Discharge Equipment Recommendations: walker, rolling  Barriers to discharge: None    Assessment:     Lachelle Alanis is a 68 y.o. female admitted with a medical diagnosis of Primary osteoarthritis of left hip.  She presents with the following impairments/functional limitations: weakness, impaired endurance, impaired functional mobility, decreased lower extremity function, pain, decreased ROM, edema, orthopedic precautions .    Rehab Prognosis: Good; patient would benefit from acute skilled PT services to address these deficits and reach maximum level of function.    Recent Surgery: Procedure(s) (LRB):  ROBOTIC ARTHROPLASTY,HIP (Left) 1 Day Post-Op    Plan:     During this hospitalization, patient to be seen BID to address the identified rehab impairments via gait training, therapeutic activities, therapeutic exercises and progress toward the following goals:    Plan of Care Expires:  07/12/24    Subjective     Chief Complaint: n/a  Patient/Family Comments/goals: go home  Pain/Comfort:  Pain Rating 1: 0/10  Location - Side 1: Left      Objective:     Communicated with rn prior to session.  Patient found up in chair with   upon PT entry to room.     General Precautions: Standard, fall  Orthopedic Precautions: LLE weight bearing as tolerated, LLE posterior precautions  Braces:    Respiratory Status: Room air     Functional Mobility:  Bed Mobility:     Supine to Sit: supervision  Transfers:     Sit to Stand:  supervision with rolling walker  Bed to Chair: supervision with  rolling walker  using  Step Transfer  Gait: pt amb 400ft w/rw and sba for safety. Pt amb with normal gait speed and step through gait pattern.   Stairs:  Pt ascended/descended 3 stair(s) and 4" curb step with Rolling Walker with bilateral handrails with Stand-by Assistance.     Treatment & " Education:  Pt was able to recall 2/3 precautions. Pt was educated on hep and preformed seated ex 10x in chair.     Patient left up in chair with all lines intact and call button in reach..    GOALS:   Multidisciplinary Problems       Physical Therapy Goals          Problem: Physical Therapy    Goal Priority Disciplines Outcome Goal Variances Interventions   Physical Therapy Goal     PT, PT/OT Progressing     Description: Pt will improve functional independence by performing:    Bed mobility: SBA  Sit to stand: SBA with rolling walker-met  Bed to chair t/f: SBA with Stand Step  with rolling walker-met  Ambulation x 200'  with SBA with rolling walker-met  1 Step (Curb): Min A  with rolling walker-met  3 Steps: Min A  with B HR-met  Independent with total hip HEP                        Time Tracking:     PT Received On:    PT Start Time: 0832     PT Stop Time: 0856  PT Total Time (min): 24 min     Billable Minutes: Therapeutic Activity 14 and Therapeutic Exercise 10    Treatment Type: Treatment  PT/PTA: PTA     Number of PTA visits since last PT visit: 1 07/09/2024

## 2024-07-11 ENCOUNTER — PATIENT MESSAGE (OUTPATIENT)
Dept: ADMINISTRATIVE | Facility: OTHER | Age: 69
End: 2024-07-11
Payer: MEDICARE

## 2024-07-11 LAB — VIEW PATHOLOGY REPORT (RELIAPATH): NORMAL

## 2024-07-13 ENCOUNTER — PATIENT MESSAGE (OUTPATIENT)
Dept: ADMINISTRATIVE | Facility: OTHER | Age: 69
End: 2024-07-13
Payer: MEDICARE

## 2024-07-15 ENCOUNTER — PATIENT MESSAGE (OUTPATIENT)
Dept: ADMINISTRATIVE | Facility: OTHER | Age: 69
End: 2024-07-15
Payer: MEDICARE

## 2024-07-19 ENCOUNTER — PATIENT MESSAGE (OUTPATIENT)
Dept: ADMINISTRATIVE | Facility: OTHER | Age: 69
End: 2024-07-19
Payer: MEDICARE

## 2024-07-19 NOTE — DISCHARGE SUMMARY
[facility] - Orthopaedics  Discharge Summary      Admit Date: 7/8/2024    Discharge Date and Time: 7/9/2024 12:17 PM    Attending Physician: Danica att. providers found     Reason for Admission: primary osteoarthritis left hip    Procedures Performed: Procedure(s) (LRB):  ROBOTIC ARTHROPLASTY,HIP (Left)    Hospital Course Patient seen in clinic with complaints of left hip pain. Tried and failed all conservative measures including activity modification, anti-inflammatories, and injections. Patient felt as though they have reached a point of disability with regards to left hip pain. Risk, benefits, and alternatives discussed for left total hip arthroplasty. Despite these risks, the patient wished to proceed with surgical intervention.    Patient admitted as an outpatient. Seen preoperatively by anesthesia and cleared for surgery. Patient was then taken to the OR and a left total hip arthroplasty was performed. For full details please see dictated operative note. Patient tolerated the procedure without any issues and was transferred to the floor postoperatively. Physical therapy began working with the patient on postop day 1 and patient was made weightbearing as tolerated to affected extremity. Patient progressed well with physical therapy and eventually cleared all physical therapy guidelines. Patient's pain and vitals remained stable throughout hospitalization. Wound was checked and found to be clean, dry, and intact. At this point the patient was deemed suitable to discharge home.      Goals of Care Treatment Preferences:         Consults: PT    Significant Diagnostic Studies:   Specimen (24h ago, onward)      None            Final Diagnoses:    Principal Problem: Primary osteoarthritis of left hip   Secondary Diagnoses:   Active Hospital Problems    Diagnosis  POA    *Primary osteoarthritis of left hip [M16.12]  Yes      Resolved Hospital Problems   No resolved problems to display.       Discharged  Condition: good    Disposition: Home or Self Care    Follow Up/Patient Instructions:     Medications:  Reconciled Home Medications:      Medication List        START taking these medications      acetaminophen 500 MG tablet  Commonly known as: TYLENOL  Take 1 tablet (500 mg total) by mouth every 4 (four) hours. for 14 days     bisacodyL 5 mg EC tablet  Commonly known as: DULCOLAX  Take 1 tablet (5 mg total) by mouth once daily.     methocarbamoL 500 MG Tab  Commonly known as: ROBAXIN  Take 1 tablet (500 mg total) by mouth every 8 (eight) hours as needed (Muscle spasms/Thigh Pain).            CHANGE how you take these medications      aspirin 81 MG Chew  Take 1 tablet (81 mg total) by mouth once daily. Increase to twice a day for 6 weeks post-op for blood clot prevention.  What changed: additional instructions            CONTINUE taking these medications      celecoxib 200 MG capsule  Commonly known as: CeleBREX  Take 200 mg by mouth 2 (two) times daily.     cholecalciferol (vitamin D3) 125 mcg (5,000 unit) capsule  Take 5,000 Units by mouth once daily.     co-enzyme Q-10 30 mg capsule  Take 200 mg by mouth once daily.     cyanocobalamin 1000 MCG tablet  Commonly known as: VITAMIN B-12  Take 1,000 mcg by mouth every Mon, Wed, Fri.     fenofibrate 54 MG tablet  Commonly known as: TRICOR  Take 54 mg by mouth once daily.     flecainide 50 MG Tab  Commonly known as: TAMBOCOR  Take 50 mg by mouth 2 (two) times daily.     glimepiride 2 MG tablet  Commonly known as: AMARYL  Take 4 mg by mouth daily with breakfast. 2mg at bedtime     GLUCOSAMINE 1500 COMPLEX ORAL  Take 1 tablet by mouth 2 (two) times a day.     levothyroxine 137 MCG Tab tablet  Commonly known as: SYNTHROID  Take 137 mcg by mouth before breakfast.     magnesium oxide 400 mg (241.3 mg magnesium) tablet  Commonly known as: MAG-OX  Take 400 mg by mouth once daily.     metoprolol succinate 25 MG 24 hr tablet  Commonly known as: TOPROL-XL  Take 25 mg by mouth 2  (two) times daily.     mupirocin 2 % ointment  Commonly known as: BACTROBAN  Apply topically 2 (two) times daily. Apply to nares BID for 10 days     niacin 500 MG Cpsr  Take 1,000 mg by mouth every evening.     omega-3 acid ethyl esters 1 gram capsule  Commonly known as: LOVAZA  Take 2 capsules by mouth 2 (two) times daily.     pantoprazole 20 MG tablet  Commonly known as: PROTONIX  Take 20 mg by mouth once daily.     rosuvastatin 10 MG tablet  Commonly known as: CRESTOR  Take 10 mg by mouth once daily.     sertraline 100 MG tablet  Commonly known as: ZOLOFT  Take 100 mg by mouth once daily.     VITAMIN C 1000 MG tablet  Generic drug: ascorbic acid (vitamin C)  Take 1,000 mg by mouth once daily.     vitamin E 400 UNIT capsule  Take 400 Units by mouth once daily.     ZINC-15 ORAL  Take 50 mg by mouth once.            ASK your doctor about these medications      cefadroxil 500 MG Cap  Commonly known as: DURICEF  Take 1 capsule (500 mg total) by mouth every 12 (twelve) hours. for 7 days  Ask about: Should I take this medication?            No discharge procedures on file.   Follow-up Information       Equipment, [facility].    Why: This is the equipment company. Call if you have questions or concerns.  Contact information:  [address] [place] [address] [place] [address]  [phone]               Therapy, [facility].    Specialty: Physical Therapy  Why: This is the outpatient therapy facility. They will contact pt with appt date & time. Call if you have questions or concerns.  Contact information:  [address] [place] [address]  [phone]               Alexey Wayne MD. Go on 7/23/2024.    Specialty: Orthopedic Surgery  Why: Ortho follow up appt on Tuesday 7/23/24 @ 8:00am.  Contact information:  [address] [facility].  [address]  [place] [address]  [phone]

## 2024-07-19 NOTE — PROGRESS NOTES
I have seen the patient, reviewed the Nurse Practitioner's discharge summary. I have personally interviewed and examined the patient at bedside and: agree with the findings.     Alexey Wayne MD  Orthopedic Surgery

## 2024-07-23 ENCOUNTER — PATIENT MESSAGE (OUTPATIENT)
Dept: ADMINISTRATIVE | Facility: OTHER | Age: 69
End: 2024-07-23
Payer: MEDICARE

## 2024-07-23 ENCOUNTER — HOSPITAL ENCOUNTER (OUTPATIENT)
Dept: RADIOLOGY | Facility: CLINIC | Age: 69
Discharge: HOME | End: 2024-07-23
Attending: NURSE PRACTITIONER
Payer: MEDICARE

## 2024-07-23 ENCOUNTER — OFFICE VISIT (OUTPATIENT)
Dept: ORTHOPEDICS | Facility: CLINIC | Age: 69
End: 2024-07-23
Payer: MEDICARE

## 2024-07-23 VITALS
HEIGHT: 60 IN | DIASTOLIC BLOOD PRESSURE: 62 MMHG | WEIGHT: 146 LBS | HEART RATE: 58 BPM | BODY MASS INDEX: 28.66 KG/M2 | SYSTOLIC BLOOD PRESSURE: 123 MMHG

## 2024-07-23 DIAGNOSIS — Z47.1 AFTERCARE FOLLOWING LEFT HIP JOINT REPLACEMENT SURGERY: ICD-10-CM

## 2024-07-23 DIAGNOSIS — Z96.642 AFTERCARE FOLLOWING LEFT HIP JOINT REPLACEMENT SURGERY: ICD-10-CM

## 2024-07-23 DIAGNOSIS — Z47.1 AFTERCARE FOLLOWING LEFT HIP JOINT REPLACEMENT SURGERY: Primary | ICD-10-CM

## 2024-07-23 DIAGNOSIS — Z96.642 AFTERCARE FOLLOWING LEFT HIP JOINT REPLACEMENT SURGERY: Primary | ICD-10-CM

## 2024-07-23 PROCEDURE — 73502 X-RAY EXAM HIP UNI 2-3 VIEWS: CPT | Mod: LT,,, | Performed by: NURSE PRACTITIONER

## 2024-07-23 PROCEDURE — 3074F SYST BP LT 130 MM HG: CPT | Mod: CPTII,,, | Performed by: NURSE PRACTITIONER

## 2024-07-23 PROCEDURE — 1101F PT FALLS ASSESS-DOCD LE1/YR: CPT | Mod: CPTII,,, | Performed by: NURSE PRACTITIONER

## 2024-07-23 PROCEDURE — 99024 POSTOP FOLLOW-UP VISIT: CPT | Mod: ,,, | Performed by: NURSE PRACTITIONER

## 2024-07-23 PROCEDURE — 3078F DIAST BP <80 MM HG: CPT | Mod: CPTII,,, | Performed by: NURSE PRACTITIONER

## 2024-07-23 PROCEDURE — 3044F HG A1C LEVEL LT 7.0%: CPT | Mod: CPTII,,, | Performed by: NURSE PRACTITIONER

## 2024-07-23 PROCEDURE — 3288F FALL RISK ASSESSMENT DOCD: CPT | Mod: CPTII,,, | Performed by: NURSE PRACTITIONER

## 2024-07-23 PROCEDURE — 1159F MED LIST DOCD IN RCRD: CPT | Mod: CPTII,,, | Performed by: NURSE PRACTITIONER

## 2024-07-23 RX ORDER — CELECOXIB 200 MG/1
200 CAPSULE ORAL 2 TIMES DAILY
Qty: 60 CAPSULE | Refills: 0 | Status: SHIPPED | OUTPATIENT
Start: 2024-07-23 | End: 2024-08-22

## 2024-07-23 NOTE — PROGRESS NOTES
Chief Complaint:   Chief Complaint   Patient presents with    Post-op Evaluation     2wk sp Left Total Hip 7/8/2024-10/06/2024. Xr done today. Doing well overall. States some discomfort here and there but this is tolerable. Pain well controlled. Working with therapy.       History of present illness: Lachelle Alanis is a 68 y.o. female, presents to clinic today in regards to her left hip.  Patient is 2 weeks out from surgery.  Overall she is doing well.  Ambulating without any assistance.  Currently is in physical therapy for strengthening, range of motion, mobility.  Doing very well with this.  In regards incision site denies any drainage or bleeding.  No dressing on today here in clinic.  Well healed.  Denies any pain and not on any current pain medication.  Only taking Celebrex does not need a refill.      Past Medical History:   Diagnosis Date    Anemia     Anxiety     Arm pain, right     Cervical spondylosis with radiculopathy     Depression     Diabetes     Dupuytren's contracture     GERD (gastroesophageal reflux disease)     Hard of hearing     Headache     Heart palpitations     Hyperlipidemia     Hypertension     Irregular heart beat     Postoperative hypothyroidism     Shoulder pain     Sleep apnea     does not use CPAP    Unspecified osteoarthritis, unspecified site        Past Surgical History:   Procedure Laterality Date    ABDOMINAL SURGERY      tummy tuck    ADENOIDECTOMY      Dont remember    AUGMENTATION OF BREAST      BLADDER SUSPENSION      BUNIONECTOMY Right     CARPAL TUNNEL RELEASE Bilateral     COLONOSCOPY      DECOMPRESSION OF ULNAR NERVE      ESOPHAGOGASTRODUODENOSCOPY      HIP SURGERY Right     HYSTERECTOMY      vaginal; partial    ROBOTIC ARTHROPLASTY, HIP Left 7/8/2024    Procedure: ROBOTIC ARTHROPLASTY,HIP;  Surgeon: Alexey Wayne MD;  Location: [facility];  Service: Orthopedics;  Laterality: Left;    THYROIDECTOMY      due to goiter, Hoshimoto's disease    TONSILLECTOMY      TOTAL  REPLACEMENT OF CERVICAL INTERVERTEBRAL DISC N/A 12/27/2022    Procedure: REPLACEMENT, INTERVERTEBRAL DISC, CERVICAL, TOTAL;  Surgeon: Daniel Melissa MD;  Location: [facility];  Service: Neurosurgery;  Laterality: N/A;  C5-6, C6-7 TOTAL DISC REPLACEMENT, ACDF IF NECESSARY  NTI //  JAY    TRIGGER FINGER RELEASE      left 3rd finger       Current Outpatient Medications   Medication Sig    acetaminophen (TYLENOL) 500 MG tablet Take 1 tablet (500 mg total) by mouth every 4 (four) hours. for 14 days    ascorbic acid, vitamin C, (VITAMIN C) 1000 MG tablet Take 1,000 mg by mouth once daily.    aspirin 81 MG Chew Take 1 tablet (81 mg total) by mouth once daily. Increase to twice a day for 6 weeks post-op for blood clot prevention.    bisacodyL (DULCOLAX) 5 mg EC tablet Take 1 tablet (5 mg total) by mouth once daily.    celecoxib (CELEBREX) 200 MG capsule Take 200 mg by mouth 2 (two) times daily.    cholecalciferol, vitamin D3, 125 mcg (5,000 unit) capsule Take 5,000 Units by mouth once daily.    co-enzyme Q-10 30 mg capsule Take 200 mg by mouth once daily.    cyanocobalamin (VITAMIN B-12) 1000 MCG tablet Take 1,000 mcg by mouth every Mon, Wed, Fri.    fenofibrate (TRICOR) 54 MG tablet Take 54 mg by mouth once daily.    flecainide (TAMBOCOR) 50 MG Tab Take 50 mg by mouth 2 (two) times daily.    glimepiride (AMARYL) 2 MG tablet Take 4 mg by mouth daily with breakfast. 2mg at bedtime    glucosamine/chondroitin/C/Farzad (GLUCOSAMINE 1500 COMPLEX ORAL) Take 1 tablet by mouth 2 (two) times a day.    levothyroxine (SYNTHROID) 137 MCG Tab tablet Take 137 mcg by mouth before breakfast.    methocarbamoL (ROBAXIN) 500 MG Tab Take 1 tablet (500 mg total) by mouth every 8 (eight) hours as needed (Muscle spasms/Thigh Pain).    metoprolol succinate (TOPROL-XL) 25 MG 24 hr tablet Take 25 mg by mouth 2 (two) times daily.    niacin 500 MG CpSR Take 1,000 mg by mouth every evening.    omega-3 acid ethyl esters (LOVAZA) 1 gram capsule Take 2  capsules by mouth 2 (two) times daily.    pantoprazole (PROTONIX) 20 MG tablet Take 20 mg by mouth once daily.    rosuvastatin (CRESTOR) 10 MG tablet Take 10 mg by mouth once daily.    sertraline (ZOLOFT) 100 MG tablet Take 100 mg by mouth once daily.    vitamin E 400 UNIT capsule Take 400 Units by mouth once daily.    zinc sulfate (ZINC-15 ORAL) Take 50 mg by mouth once.    magnesium oxide (MAG-OX) 400 mg (241.3 mg magnesium) tablet Take 400 mg by mouth once daily.     No current facility-administered medications for this visit.       Review of patient's allergies indicates:   Allergen Reactions    Adhesive tape-silicones        Family History   Problem Relation Name Age of Onset    Diabetes Mellitus Mother Angelina     Heart disease Mother Angelina     Hyperlipidemia Mother Angelina     Cancer Mother Angelina     Diabetes Mother Angelina     Diabetes Mellitus Father Oswaldo Anderson     Heart disease Father Oswaldo Anderson     Hypertension Father Oswaldo Anderson     Diabetes Father Oswaldo Anderson     Arthritis Brother Kamlesh     Diabetes Brother Kamlesh     Arthritis Brother Daniel     Diabetes Brother Fran        Social History     Socioeconomic History    Marital status:    Tobacco Use    Smoking status: Former     Current packs/day: 0.50     Average packs/day: 0.5 packs/day for 30.0 years (15.0 ttl pk-yrs)     Types: Cigarettes    Smokeless tobacco: Never   Substance and Sexual Activity    Alcohol use: Never    Drug use: Never    Sexual activity: Yes     Birth control/protection: Post-menopausal     Social Determinants of Health     Financial Resource Strain: Low Risk  (12/28/2022)    Overall Financial Resource Strain (CARDIA)     Difficulty of Paying Living Expenses: Not hard at all   Food Insecurity: Unknown (12/28/2022)    Hunger Vital Sign     Ran Out of Food in the Last Year: Never true   Transportation Needs: No Transportation Needs (12/28/2022)    PRAPARE - Transportation     Lack of Transportation (Medical): No     Lack of  Transportation (Non-Medical): No   Housing Stability: Unknown (12/28/2022)    Housing Stability Vital Sign     Unable to Pay for Housing in the Last Year: No     Unstable Housing in the Last Year: No           Review of Systems:    Denies fevers, chills, chest pain, shortness of breath. Comprehensive review of systems performed and otherwise negative except as noted in HPI      Physical Examination:    General: awake and alert, no acute distress, healthy appearing  Head and Neck: Head atraumatic/normocephalic. Moist MM  CV: brisk cap refill  Lungs: non-labored breathing, w/o cough or SOB  Skin: no rashes present, warm to touch  Neuro: sensation grossly intact distall     Vital Signs:    Vitals:    07/23/24 1432   BP: 123/62   Pulse: (!) 58       Body mass index is 28.51 kg/m².    Left hip exam:    Left hip incision clean dry and intact. No erythema, drainage, or signs of infection  No swelling distally. No signs of DVT  Brisk cap refill left foot  Sensation intact distally to left foot  Positive FHL/EHL/gastrocsoleus/tib ant    X-rays: 3 views of the left hip reviewed. Patient's implants appear well fixed. No signs of loosening or subsidence noted.     Assessment:: post op s/p L KULWANT    Plan:  Patient presents to clinic regards to left hip.  She is 2 weeks from surgery.  Her hip is stable on exam x-rays today in.  Incision site is well healed.  She is to continue physical therapy for strengthening, range of motion, mobility.  Celebrex we will be refilled today clinic.  She can follow up 1 month x-rays for evaluation of her hip.  Patient states understanding and agrees with plan of care.    This note was created using "[employer] voice recognition software that occasionally misinterpreted phrases or words.    Consult note is delivered via Epic messaging service.

## 2024-08-02 NOTE — PROGRESS NOTES
[facility]  History & Physical  Neurosurgery      Lachelle Alanis   [id number]   1955     SUBJECTIVE:     CHIEF COMPLAINT:  Headaches x2-3 weeks    HPI:  Lachelle Alanis is a 68 y.o. female who presents for a follow-up.  She is s/p C5-6, C6-7 TDR that was done on 12/27/22 by Dr. Melissa. She was last seen in clinic on 3/29/2023 reporting occasional right upper arm pain which was improved following surgical intervention. She reported occasional difficulty with swallowing but reported this was a chronic issue prior to surgical intervention.     The patient was last seen by Dr. Melissa on 2/14/2024 describing continued posterior neck pain and headaches to the back of the head.  She reported improvement of her neck pain and headaches for the first 6-8 months following surgery although her symptoms gradually regressed.  Thankfully her symptoms have not regressed to baseline.  She feels her symptoms began to worsen around 11/20/2023.  She had not had any return of her previous preoperative arm pains.  She was denying numbness and weakness at that time.  She had not been to physical therapy following surgery and had been doing some stretching exercises at home.  She also did not begin home cervical traction as she states she had read some bad reviews online.  Following this visit Dr. Melissa prescribed Robaxin and Topamax.  He recommended the patient begin some outpatient physical therapy and also referred her on for a neurology consultation.    She returns today reporting continued occasional right-sided posterior neck pain as well as pain radiating into the bilateral superior trapezius region.  She rates his pain a 2/10.  She states she did see some improvement with outpatient physical therapy following her previous visit.  She reports she did not pursue neurology consultation as her headaches resolved on their own.  She admits she has not remained consistent with home exercises although does continue  to stretch occasionally.  She was denying any issues with balance or changes in bowel or bladder function.  She states recently she has had a flare-up of some left buttock and lower back pain which she is contributing to sciatic irritation.  She also continues to struggle with intermittent dizziness in which she feels the room is spinning around her.    Past Medical History:   Diagnosis Date    Anemia     Anxiety     Arm pain, right     Cervical spondylosis with radiculopathy     Depression     Diabetes     Dupuytren's contracture     GERD (gastroesophageal reflux disease)     Hard of hearing     Headache     Heart palpitations     Hyperlipidemia     Hypertension     Irregular heart beat     Postoperative hypothyroidism     Shoulder pain     Sleep apnea     does not use CPAP    Unspecified osteoarthritis, unspecified site        Past Surgical History:   Procedure Laterality Date    ABDOMINAL SURGERY      tummy tuck    ADENOIDECTOMY      Dont remember    AUGMENTATION OF BREAST      BLADDER SUSPENSION      BUNIONECTOMY Right     CARPAL TUNNEL RELEASE Bilateral     COLONOSCOPY      DECOMPRESSION OF ULNAR NERVE      ESOPHAGOGASTRODUODENOSCOPY      HIP REPLACEMENT ARTHROPLASTY Left     HIP SURGERY Right     HYSTERECTOMY      vaginal; partial    ROBOTIC ARTHROPLASTY, HIP Left 07/08/2024    Procedure: ROBOTIC ARTHROPLASTY,HIP;  Surgeon: Alexey Wayne MD;  Location: [facility];  Service: Orthopedics;  Laterality: Left;    THYROIDECTOMY      due to goiter, Hoshimoto's disease    TONSILLECTOMY      TOTAL REPLACEMENT OF CERVICAL INTERVERTEBRAL DISC N/A 12/27/2022    Procedure: REPLACEMENT, INTERVERTEBRAL DISC, CERVICAL, TOTAL;  Surgeon: Daniel Melissa MD;  Location: [facility] OR;  Service: Neurosurgery;  Laterality: N/A;  C5-6, C6-7 TOTAL DISC REPLACEMENT, ACDF IF NECESSARY  NTI //  JAY    TRIGGER FINGER RELEASE      left 3rd finger       Family History   Problem Relation Name Age of Onset    Diabetes Mellitus Mother [place]      Heart disease Mother [place]     Hyperlipidemia Mother [place]     Cancer Mother [place]     Diabetes Mother [place]     Diabetes Mellitus Father Oswaldo Anderson     Heart disease Father Oswaldo Anderson     Hypertension Father Oswaldo Anderson     Diabetes Father Oswaldo Anderson     Arthritis Brother Kamlesh     Diabetes Brother Kamlesh     Arthritis Brother Daniel     Diabetes Brother Fran        Social History     Socioeconomic History    Marital status:    Tobacco Use    Smoking status: Former     Current packs/day: 0.50     Average packs/day: 0.5 packs/day for 30.0 years (15.0 ttl pk-yrs)     Types: Cigarettes    Smokeless tobacco: Never   Substance and Sexual Activity    Alcohol use: Never    Drug use: Never    Sexual activity: Yes     Birth control/protection: Post-menopausal     Social Determinants of Health     Financial Resource Strain: Low Risk  (12/28/2022)    Overall Financial Resource Strain (CARDIA)     Difficulty of Paying Living Expenses: Not hard at all   Food Insecurity: Unknown (12/28/2022)    Hunger Vital Sign     Ran Out of Food in the Last Year: Never true   Transportation Needs: No Transportation Needs (12/28/2022)    PRAPARE - Transportation     Lack of Transportation (Medical): No     Lack of Transportation (Non-Medical): No   Housing Stability: Unknown (12/28/2022)    Housing Stability Vital Sign     Unable to Pay for Housing in the Last Year: No     Unstable Housing in the Last Year: No       Review of patient's allergies indicates:   Allergen Reactions    Adhesive tape-silicones         Current Outpatient Medications   Medication Instructions    ascorbic acid, vitamin C, (VITAMIN C) 1000 MG tablet 0    aspirin (ECOTRIN) 81 MG EC tablet 0    bisacodyL (DULCOLAX) 5 mg, Oral, Daily    cholecalciferol (vitamin D3) 5,000 Units, Oral, Daily    cyanocobalamin (VITAMIN B-12) 1,000 mcg, Oral, Every Mon, Wed, Fri    fenofibrate (TRICOR) 54 mg, Oral, Daily    flecainide (TAMBOCOR) 50 mg, Oral, 2 times daily    glimepiride  (AMARYL) 4 mg, Oral, With breakfast, 2mg at bedtime    levothyroxine (SYNTHROID) 125 mcg, Oral, Before breakfast    metoprolol succinate (TOPROL-XL) 25 mg, Oral, 2 times daily    niacin 1,000 mg, Oral, Nightly    omega-3 acid ethyl esters (LOVAZA) 1 gram capsule 2 capsules, Oral, 2 times daily    ONETOUCH DELICA PLUS LANCET 33 gauge Misc Topical (Top)    ONETOUCH VERIO TEST STRIPS Strp SMARTSIG:Via Meter    pantoprazole (PROTONIX) 20 mg, Oral, Daily    rosuvastatin (CRESTOR) 10 mg, Oral, Daily    sertraline (ZOLOFT) 100 mg, Oral, Daily    vitamin E 400 Units, Oral, Daily    zinc sulfate (ZINC-15 ORAL) 50 mg, Oral, Once          Review of Systems   Constitutional:  Negative for chills, fever and weight loss.   HENT:  Negative for congestion, hearing loss, nosebleeds and tinnitus.    Eyes:  Negative for blurred vision, double vision and photophobia.   Respiratory:  Negative for cough, shortness of breath and wheezing.    Cardiovascular:  Negative for chest pain, palpitations and leg swelling.   Gastrointestinal:  Negative for constipation, diarrhea, nausea and vomiting.   Genitourinary:  Negative for dysuria, frequency and urgency.   Musculoskeletal:  Positive for back pain (left lower back and buttock) and neck pain. Negative for falls.   Skin:  Negative for itching and rash.   Neurological:  Positive for dizziness. Negative for tingling, tremors, sensory change, speech change, seizures, loss of consciousness and headaches.   Psychiatric/Behavioral:  Negative for depression, hallucinations and memory loss. The patient is not nervous/anxious.          OBJECTIVE:     Physical Exam    Visit Vitals  /67 (BP Location: Left arm, Patient Position: Sitting)   Pulse 61   Resp 16   Ht 5' (1.524 m)   Wt 66.2 kg (146 lb)   LMP  (LMP Unknown)   BMI 28.51 kg/m²        General:  Pleasant, Well-nourished, Well-groomed.    Cardiovascular:  Heart has regular rate and rhythm.    Lungs:  Breathing is quiet,  non-lablored    Musculoskeletal:  Incision:  Well healed.  ROM is Full with the exception of mild limitation with cervical extension    Neurological:     Muscle strength against resistance:   Right Left   Deltoid (C5) 5/5 5/5   Biceps (C5/6) 5/5 5/5   Brachioradialis (C5/6) 5/5 5/5   Triceps (C7) 5/5 5/5   Wrist extension (C7) 5/5 5/5   Finger abduction (C8) 5/5 5/5    5/5 5/5        Hip abduction 5/5 5/5   Hip adduction 5/5 5/5   Knee extension (L3) 5/5 5/5   Knee flexion (L4) 5/5 5/5   Dorsiflexion (L5) 5/5 5/5   EHL (L5) 5/5 5/5   Plantar flexion (S1) 5/5 5/5   Sensation is intact to primary modalities in bilateral upper and lower extremities.    Reflexes:   Right Left   Triceps (C7) 2+ 2+   Biceps (C5) 2+ 2+   Brachioradialis (C6) 2+ 2+   Patellar (L4) 2+ 2+   Achilles (S1) 2+ 2+   Negative Babinski, Clonus, Parra, Tinel's, and Phalen's bilaterally.  Gait is normal  Coordination is normal.    Imaging:  AP, lateral, extension and flexion views of the cervical spine dated 8/14/2024 reveal stable disc arthroplasties at C5-6 and C6-7 without evidence of hardware failure.  Slight anterior listhesis of C4 on C5 with minimal motion on extension and flexion.    ASSESSMENT:       ICD-10-CM ICD-9-CM   1. Muscle spasticity  M62.838 728.85   2. Cervical spondylosis  M47.812 721.0   3. Vertigo  R42 780.4     PLAN:   1. Muscle spasticity    2. Cervical spondylosis    3. Vertigo  - Ambulatory referral/consult to ENT; Future    Lachelle Alanis returns today describing some improvement of her neck pain with participation outpatient physical therapy.  She continues to have some tightness and a knot into the right occipital and upper cervical region.  She was encouraged to attempt dry needling or massage therapy to this region to ease some of this tension. I again recommend an over the door cervical traction device given the lack of myelopathic symptoms on examination.  She was advised to utilize this device for 10-15  minutes twice daily.  She was also encouraged to continue on with home cervical exercises at least 3-4 days a week.  We will also refer her on for an ENT consultation regarding what sounds like vertigo episodes.  We will plan to see the patient back in clinic in approximately 6 months or sooner should the nature arise.  Should she have any new or worsening symptoms prior to this visit she was advised to notify our office.    Follow up in about 6 months (around 2/14/2025).    E/M Level Based On Time:   15 minutes spent on reviewing chart, which includes interpreting lab results and diagnostic tests.   25 minutes spent with the patient performing a history and physical exam, counseling or educating the patient/caregiver, prescribing medications, ordering labwork/diagnostic tests, or placing referrals.   0 minutes spent collaborating plan of care with physician.   5 minutes spent documenting all relevant clinical informationin the electronic health record.     Total Time Spent: 45 minutes           NORBERTO Beltrán    Disclaimer:  This note is prepared using voice recognition software and as such is likely to have errors despite attempts at proofreading. Please contact me for questions.

## 2024-08-14 ENCOUNTER — OFFICE VISIT (OUTPATIENT)
Dept: NEUROSURGERY | Facility: CLINIC | Age: 69
End: 2024-08-14
Payer: MEDICARE

## 2024-08-14 ENCOUNTER — HOSPITAL ENCOUNTER (OUTPATIENT)
Dept: RADIOLOGY | Facility: HOSPITAL | Age: 69
Discharge: HOME | End: 2024-08-14
Attending: NEUROLOGICAL SURGERY
Payer: MEDICARE

## 2024-08-14 VITALS
SYSTOLIC BLOOD PRESSURE: 122 MMHG | BODY MASS INDEX: 28.66 KG/M2 | HEART RATE: 61 BPM | WEIGHT: 146 LBS | DIASTOLIC BLOOD PRESSURE: 67 MMHG | RESPIRATION RATE: 16 BRPM | HEIGHT: 60 IN

## 2024-08-14 DIAGNOSIS — M47.22 CERVICAL SPONDYLOSIS WITH RADICULOPATHY: ICD-10-CM

## 2024-08-14 DIAGNOSIS — M62.838 MUSCLE SPASTICITY: Primary | ICD-10-CM

## 2024-08-14 DIAGNOSIS — R42 VERTIGO: ICD-10-CM

## 2024-08-14 DIAGNOSIS — M47.812 CERVICAL SPONDYLOSIS: ICD-10-CM

## 2024-08-14 PROCEDURE — 3078F DIAST BP <80 MM HG: CPT | Mod: CPTII,,, | Performed by: NURSE PRACTITIONER

## 2024-08-14 PROCEDURE — 99215 OFFICE O/P EST HI 40 MIN: CPT | Mod: ,,, | Performed by: NURSE PRACTITIONER

## 2024-08-14 PROCEDURE — 3288F FALL RISK ASSESSMENT DOCD: CPT | Mod: CPTII,,, | Performed by: NURSE PRACTITIONER

## 2024-08-14 PROCEDURE — 3044F HG A1C LEVEL LT 7.0%: CPT | Mod: CPTII,,, | Performed by: NURSE PRACTITIONER

## 2024-08-14 PROCEDURE — 1125F AMNT PAIN NOTED PAIN PRSNT: CPT | Mod: CPTII,,, | Performed by: NURSE PRACTITIONER

## 2024-08-14 PROCEDURE — 1159F MED LIST DOCD IN RCRD: CPT | Mod: CPTII,,, | Performed by: NURSE PRACTITIONER

## 2024-08-14 PROCEDURE — 3074F SYST BP LT 130 MM HG: CPT | Mod: CPTII,,, | Performed by: NURSE PRACTITIONER

## 2024-08-14 PROCEDURE — 3008F BODY MASS INDEX DOCD: CPT | Mod: CPTII,,, | Performed by: NURSE PRACTITIONER

## 2024-08-14 PROCEDURE — 1160F RVW MEDS BY RX/DR IN RCRD: CPT | Mod: CPTII,,, | Performed by: NURSE PRACTITIONER

## 2024-08-14 PROCEDURE — 1101F PT FALLS ASSESS-DOCD LE1/YR: CPT | Mod: CPTII,,, | Performed by: NURSE PRACTITIONER

## 2024-08-14 PROCEDURE — 72052 X-RAY EXAM NECK SPINE 6/>VWS: CPT | Mod: TC

## 2024-08-14 RX ORDER — SUCRALFATE 1 G/1
1 TABLET ORAL 2 TIMES DAILY
COMMUNITY
Start: 2024-05-14 | End: 2024-08-14

## 2024-08-14 RX ORDER — IBUPROFEN 100 MG/5ML
SUSPENSION, ORAL (FINAL DOSE FORM) ORAL
COMMUNITY

## 2024-08-14 RX ORDER — LANCETS 33 GAUGE
EACH MISCELLANEOUS
COMMUNITY
Start: 2024-08-04

## 2024-08-14 RX ORDER — METHOCARBAMOL 750 MG/1
750 TABLET, FILM COATED ORAL 3 TIMES DAILY
COMMUNITY
Start: 2024-06-27 | End: 2024-08-14

## 2024-08-14 RX ORDER — ASPIRIN 81 MG/1
TABLET ORAL
COMMUNITY

## 2024-08-14 RX ORDER — LINACLOTIDE 145 UG/1
145 CAPSULE, GELATIN COATED ORAL
COMMUNITY
Start: 2024-05-01 | End: 2024-08-14

## 2024-08-14 RX ORDER — BLOOD-GLUCOSE METER
EACH MISCELLANEOUS
COMMUNITY
Start: 2024-08-02

## 2024-08-14 RX ORDER — HYDROCODONE BITARTRATE AND ACETAMINOPHEN 7.5; 325 MG/1; MG/1
1 TABLET ORAL EVERY 6 HOURS PRN
COMMUNITY
Start: 2024-06-27 | End: 2024-08-14

## 2024-08-14 RX ORDER — METFORMIN HYDROCHLORIDE 1000 MG/1
TABLET ORAL
COMMUNITY
End: 2024-08-14

## 2024-08-23 DIAGNOSIS — Z96.642 AFTERCARE FOLLOWING LEFT HIP JOINT REPLACEMENT SURGERY: Primary | ICD-10-CM

## 2024-08-23 DIAGNOSIS — Z47.1 AFTERCARE FOLLOWING LEFT HIP JOINT REPLACEMENT SURGERY: Primary | ICD-10-CM

## 2024-08-23 RX ORDER — CELECOXIB 200 MG/1
200 CAPSULE ORAL 2 TIMES DAILY
Qty: 60 CAPSULE | Refills: 0 | Status: CANCELLED | OUTPATIENT
Start: 2024-08-23 | End: 2024-09-22

## 2024-08-23 RX ORDER — METHOCARBAMOL 500 MG/1
500 TABLET, FILM COATED ORAL EVERY 8 HOURS PRN
Qty: 42 TABLET | Refills: 0 | Status: SHIPPED | OUTPATIENT
Start: 2024-08-23 | End: 2024-09-06

## 2024-08-27 ENCOUNTER — OFFICE VISIT (OUTPATIENT)
Dept: ORTHOPEDICS | Facility: CLINIC | Age: 69
End: 2024-08-27
Payer: MEDICARE

## 2024-08-27 ENCOUNTER — HOSPITAL ENCOUNTER (OUTPATIENT)
Dept: RADIOLOGY | Facility: CLINIC | Age: 69
Discharge: HOME | End: 2024-08-27
Attending: NURSE PRACTITIONER
Payer: MEDICARE

## 2024-08-27 VITALS
HEART RATE: 62 BPM | HEIGHT: 60 IN | WEIGHT: 146 LBS | SYSTOLIC BLOOD PRESSURE: 119 MMHG | BODY MASS INDEX: 28.66 KG/M2 | DIASTOLIC BLOOD PRESSURE: 69 MMHG

## 2024-08-27 DIAGNOSIS — Z96.642 AFTERCARE FOLLOWING LEFT HIP JOINT REPLACEMENT SURGERY: Primary | ICD-10-CM

## 2024-08-27 DIAGNOSIS — Z96.642 AFTERCARE FOLLOWING LEFT HIP JOINT REPLACEMENT SURGERY: ICD-10-CM

## 2024-08-27 DIAGNOSIS — Z47.1 AFTERCARE FOLLOWING LEFT HIP JOINT REPLACEMENT SURGERY: ICD-10-CM

## 2024-08-27 DIAGNOSIS — Z47.1 AFTERCARE FOLLOWING LEFT HIP JOINT REPLACEMENT SURGERY: Primary | ICD-10-CM

## 2024-08-27 PROCEDURE — 73502 X-RAY EXAM HIP UNI 2-3 VIEWS: CPT | Mod: LT,,, | Performed by: NURSE PRACTITIONER

## 2024-08-27 PROCEDURE — 3078F DIAST BP <80 MM HG: CPT | Mod: CPTII,,, | Performed by: ORTHOPAEDIC SURGERY

## 2024-08-27 PROCEDURE — 1159F MED LIST DOCD IN RCRD: CPT | Mod: CPTII,,, | Performed by: ORTHOPAEDIC SURGERY

## 2024-08-27 PROCEDURE — 1101F PT FALLS ASSESS-DOCD LE1/YR: CPT | Mod: CPTII,,, | Performed by: ORTHOPAEDIC SURGERY

## 2024-08-27 PROCEDURE — 99024 POSTOP FOLLOW-UP VISIT: CPT | Mod: ,,, | Performed by: ORTHOPAEDIC SURGERY

## 2024-08-27 PROCEDURE — 3074F SYST BP LT 130 MM HG: CPT | Mod: CPTII,,, | Performed by: ORTHOPAEDIC SURGERY

## 2024-08-27 PROCEDURE — 3044F HG A1C LEVEL LT 7.0%: CPT | Mod: CPTII,,, | Performed by: ORTHOPAEDIC SURGERY

## 2024-08-27 PROCEDURE — 3288F FALL RISK ASSESSMENT DOCD: CPT | Mod: CPTII,,, | Performed by: ORTHOPAEDIC SURGERY

## 2024-08-27 NOTE — PROGRESS NOTES
Chief Complaint:   Chief Complaint   Patient presents with    Left Hip - Follow-up     Pt states she is doing excellent. Pt finished PT sessions, but she is actively working out at home. Denies pain. No complaints.        History of present illness:    History of Present Illness  The patient presents for evaluation of hip pain.    She reports an improvement in her hip condition, with the exception of occasional discomfort in the groin area when moving in certain ways. She continues to experience sciatica, which has worsened over time. The use of anti-inflammatory medication and muscle relaxants has provided some relief. Initially, she experienced pain radiating down her leg, but this has since subsided. She was able to walk unaided one week post-surgery.    Past Medical History:   Diagnosis Date    Anemia     Anxiety     Arm pain, right     Cervical spondylosis with radiculopathy     Depression     Diabetes     Dupuytren's contracture     GERD (gastroesophageal reflux disease)     Hard of hearing     Headache     Heart palpitations     Hyperlipidemia     Hypertension     Irregular heart beat     Postoperative hypothyroidism     Shoulder pain     Sleep apnea     does not use CPAP    Unspecified osteoarthritis, unspecified site        Past Surgical History:   Procedure Laterality Date    ABDOMINAL SURGERY      tummy tuck    ADENOIDECTOMY      Dont remember    AUGMENTATION OF BREAST      BLADDER SUSPENSION      BUNIONECTOMY Right     CARPAL TUNNEL RELEASE Bilateral     COLONOSCOPY      DECOMPRESSION OF ULNAR NERVE      ESOPHAGOGASTRODUODENOSCOPY      HIP REPLACEMENT ARTHROPLASTY Left     HIP SURGERY Right     HYSTERECTOMY      vaginal; partial    ROBOTIC ARTHROPLASTY, HIP Left 07/08/2024    Procedure: ROBOTIC ARTHROPLASTY,HIP;  Surgeon: Alexey Wayne MD;  Location: [facility];  Service: Orthopedics;  Laterality: Left;    THYROIDECTOMY      due to goiter, Hoshimoto's disease    TONSILLECTOMY      TOTAL REPLACEMENT OF  CERVICAL INTERVERTEBRAL DISC N/A 12/27/2022    Procedure: REPLACEMENT, INTERVERTEBRAL DISC, CERVICAL, TOTAL;  Surgeon: Daniel Melissa MD;  Location: [facility];  Service: Neurosurgery;  Laterality: N/A;  C5-6, C6-7 TOTAL DISC REPLACEMENT, ACDF IF NECESSARY  NTI //  JAY    TRIGGER FINGER RELEASE      left 3rd finger       Current Outpatient Medications   Medication Sig    ascorbic acid, vitamin C, (VITAMIN C) 1000 MG tablet 0    aspirin (ECOTRIN) 81 MG EC tablet 0    bisacodyL (DULCOLAX) 5 mg EC tablet Take 1 tablet (5 mg total) by mouth once daily.    cholecalciferol, vitamin D3, 125 mcg (5,000 unit) capsule Take 5,000 Units by mouth once daily.    cyanocobalamin (VITAMIN B-12) 1000 MCG tablet Take 1,000 mcg by mouth every Mon, Wed, Fri.    fenofibrate (TRICOR) 54 MG tablet Take 54 mg by mouth once daily.    glimepiride (AMARYL) 2 MG tablet Take 4 mg by mouth daily with breakfast. 2mg at bedtime    levothyroxine (SYNTHROID) 137 MCG Tab tablet Take 125 mcg by mouth before breakfast.    methocarbamoL (ROBAXIN) 500 MG Tab Take 1 tablet (500 mg total) by mouth every 8 (eight) hours as needed (Muscle spasms/Thigh Pain).    metoprolol succinate (TOPROL-XL) 25 MG 24 hr tablet Take 25 mg by mouth 2 (two) times daily.    niacin 500 MG CpSR Take 1,000 mg by mouth every evening.    omega-3 acid ethyl esters (LOVAZA) 1 gram capsule Take 2 capsules by mouth 2 (two) times daily.    ONETOUCH DELICA PLUS LANCET 33 gauge Misc Apply topically.    ONETOUCH VERIO TEST STRIPS Strp SMARTSIG:Via Meter    pantoprazole (PROTONIX) 20 MG tablet Take 20 mg by mouth once daily.    rosuvastatin (CRESTOR) 10 MG tablet Take 10 mg by mouth once daily.    sertraline (ZOLOFT) 100 MG tablet Take 100 mg by mouth once daily.    vitamin E 400 UNIT capsule Take 400 Units by mouth once daily.    zinc sulfate (ZINC-15 ORAL) Take 50 mg by mouth once.    flecainide (TAMBOCOR) 50 MG Tab Take 50 mg by mouth 2 (two) times daily. (Patient not taking: Reported on  8/27/2024)     No current facility-administered medications for this visit.       Review of patient's allergies indicates:   Allergen Reactions    Adhesive tape-silicones        Family History   Problem Relation Name Age of Onset    Diabetes Mellitus Mother Angelina     Heart disease Mother Angelina     Hyperlipidemia Mother Angelina     Cancer Mother [place]     Diabetes Mother [place]     Diabetes Mellitus Father Oswaldo Anderson     Heart disease Father Oswaldo Anderson     Hypertension Father Oswaldo Anderson     Diabetes Father Oswaldo Anderson     Arthritis Brother Kamlesh     Diabetes Brother Kamlesh     Arthritis Brother Daniel     Diabetes Brother Fran        Social History     Socioeconomic History    Marital status:    Tobacco Use    Smoking status: Former     Current packs/day: 0.50     Average packs/day: 0.5 packs/day for 30.0 years (15.0 ttl pk-yrs)     Types: Cigarettes    Smokeless tobacco: Never   Substance and Sexual Activity    Alcohol use: Never    Drug use: Never    Sexual activity: Yes     Birth control/protection: Post-menopausal     Social Determinants of Health     Financial Resource Strain: Low Risk  (12/28/2022)    Overall Financial Resource Strain (CARDIA)     Difficulty of Paying Living Expenses: Not hard at all   Food Insecurity: Unknown (12/28/2022)    Hunger Vital Sign     Ran Out of Food in the Last Year: Never true   Transportation Needs: No Transportation Needs (12/28/2022)    PRAPARE - Transportation     Lack of Transportation (Medical): No     Lack of Transportation (Non-Medical): No   Housing Stability: Unknown (12/28/2022)    Housing Stability Vital Sign     Unable to Pay for Housing in the Last Year: No     Unstable Housing in the Last Year: No           Review of Systems:    Constitution: Negative for chills, fever, and sweats.  Negative for unexplained weight loss.    HENT:  Negative for headaches and blurry vision.    Cardiovascular:Negative for chest pain or irregular heart beat. Negative for  hypertension.    Respiratory:  Negative for cough and shortness of breath.    Gastrointestinal: Negative for abdominal pain, heartburn, melena, nausea, and vomitting.    Genitourinary:  Negative bladder incontinence and dysuria.    Musculoskeletal:  See HPI    Neurological: Negative for numbness.    Psychiatric/Behavioral: Negative for depression.  The patient is not nervous/anxious.      Endocrine: Negative for polyuria    Hematologic/Lymphatic: Negative for bleeding problem.  Does not bruise/bleed easily.    Skin: Negative for poor would healing and rash      Physical Examination:    Vital Signs:    Vitals:    08/27/24 1440   BP: 119/69   Pulse: 62       Body mass index is 28.51 kg/m².    Left hip:  Range of motion left hip without significant pain or discomfort.  Brisk cap refill disappeared sensation intact distally.  Internal external rotation without significant pain.    X-rays:  Three views left hip reviewed with the patient was well-fixed components no signs of loosening or subsidence noted     Assessment::  Status post left total hip arthroplasty    Plan:  Overall patient was doing quite well.  Revision she was improved from preoperative levels.  She was happy with her recovery.  Plan to see her back in 2 months.  No x-rays and she returns.    This note was generated with the assistance of ambient listening technology. Verbal consent was obtained by the patient and accompanying visitor(s) for the recording of patient appointment to facilitate this note. I attest to having reviewed and edited the generated note for accuracy, though some syntax or spelling errors may persist. Please contact the author of this note for any clarification.      This note was created using [employer] voice recognition software that occasionally misinterpreted phrases or words.    Consult note is delivered via Epic messaging service.

## 2024-09-25 ENCOUNTER — OFFICE VISIT (OUTPATIENT)
Dept: OTOLARYNGOLOGY | Facility: CLINIC | Age: 69
End: 2024-09-25
Payer: MEDICARE

## 2024-09-25 DIAGNOSIS — R42 DIZZINESS: Primary | ICD-10-CM

## 2024-09-25 DIAGNOSIS — H91.90 HEARING DISORDER, UNSPECIFIED LATERALITY: ICD-10-CM

## 2024-09-25 DIAGNOSIS — R42 VERTIGO: ICD-10-CM

## 2024-09-25 RX ORDER — TALC
POWDER (GRAM) TOPICAL
COMMUNITY

## 2024-09-25 RX ORDER — EPINEPHRINE 0.22MG
AEROSOL WITH ADAPTER (ML) INHALATION
COMMUNITY

## 2024-09-25 NOTE — PROGRESS NOTES
Audio Only Telehealth Visit     The patient location is: [facility]  The chief complaint leading to consultation is: dizziness  Visit type: Virtual visit with audio only (telephone)  Total time spent on visit: 12 min     The reason for the audio only service rather than synchronous audio and video virtual visit was related to technical difficulties or patient preference/necessity.     Each patient to whom I provide medical services by telemedicine is:  (1) informed of the relationship between the physician and patient and the respective role of any other health care provider with respect to management of the patient; and (2) notified that they may decline to receive medical services by telemedicine and may withdraw from such care at any time. Patient verbally consented to receive this service via voice-only telephone call.       HPI: 09/25/2024: 68 y.o. female referred from neurosurgery for dizziness. Reports this has been occurring off/on for a couple of years. She describes this as room spinning which lasts a few seconds. It is exacerbated by lying down and getting up. Endorses HL and tinnitus. States she is supposed to be getting hearing aids but is unable to afford them at this time. Has hearing tests every 6-12 months with Dr. Saunders. Denies hx of otologic infections or procedures/surgeries. Denies loud noise exposure. States parents had hearing loss in their 80s. Denies MVC or any type of head trauma around the time symptoms began.      Assessment and plan:  68 y.o. female with vertiginous dizziness, suspicious for BPPV.   - Safety precautions  - Referral to audio for Hallpike/CRM if indicated  - RTC 8 weeks    Cleopatra Serrano NP      This service was not originating from a related E/M service provided within the previous 7 days nor will  to an E/M service or procedure within the next 24 hours or my soonest available appointment.  Prevailing standard of care was able to be met in this audio-only visit.

## 2024-10-08 ENCOUNTER — CLINICAL SUPPORT (OUTPATIENT)
Dept: AUDIOLOGY | Facility: HOSPITAL | Age: 69
End: 2024-10-08
Payer: MEDICARE

## 2024-10-08 DIAGNOSIS — H90.3 SENSORINEURAL HEARING LOSS (SNHL) OF BOTH EARS: Primary | ICD-10-CM

## 2024-10-08 DIAGNOSIS — R42 DIZZINESS: ICD-10-CM

## 2024-10-08 DIAGNOSIS — H91.90 HEARING DISORDER, UNSPECIFIED LATERALITY: ICD-10-CM

## 2024-10-08 PROCEDURE — 92557 COMPREHENSIVE HEARING TEST: CPT | Performed by: AUDIOLOGIST-HEARING AID FITTER

## 2024-10-08 PROCEDURE — 92567 TYMPANOMETRY: CPT | Performed by: AUDIOLOGIST-HEARING AID FITTER

## 2024-10-08 NOTE — PROGRESS NOTES
Audiological Evaluation    Patient History:    Patient evaluated today to assess hearing.  She reportedly perceives difficulties hearing especially in the presence of background noise.  A recent assessment was performed by the audiologist at [facility] with reports of hearing loss (type and severity unknown at this time).   Otalgia, otorrhea and a history of middle ear involvement/otologic procedures have been denied at this time, however she does endorse intermittent tinnitus and episodic vertigo.  Patient's medical history has reportedly not changed since most recent medical evaluation.       Pure Tone Testing:    Right ear:       Mild to moderately-severe, SNHL    Left ear:          Mild to moderately-severe, SNHL        Tympanometry:      Right ear:   Type 'A' tympanogram    Left ear: Type 'A' tympanogram           Acoustic Reflex Testing    Right ear:   Did not test     Left ear: Did not test        Interpretations:    Pure tone testing revealed a mild to moderately- severe, sensorineural hearing loss, bilaterally. Speech reception thresholds were obtained at 35 dB HL (right ear) and 30 dB HL (left ear) consistent with pure tone results, bilaterally.  Word recognition scores were excellent, bilaterally.  Immittance testing revealed a Type A tympanogram for the right ear indicative of normal middle ear function; a seal could not be maintain for the left ear. Otoscopy revealed clear EACs, bilaterally.      Recommendations:     Audiological testing annually  ENT evaluation per ENT  Hearing protection when exposed to hazardous noise  Binaural amplification (patient was given HA assistance information)      Halfway-Hallpike maneuver     A DHP maneuver was performed due to suspicions of BPPV.  Ms. Alanis endorses episodic vertigo, however the symptoms do not occur daily.  She reports she may experience several days without any symptoms present.  When experienced, the vertigo typically occurs when changing  positions such as sitting to standing and rising from her bed.  The DHP for both head right and left were negative for canalith involvement.  It is advised that Ms. Zeke have comprehensive vestibular testing should symptoms persist.       Kyle Saavedra.  Clinical Audiologist

## 2024-11-05 ENCOUNTER — OFFICE VISIT (OUTPATIENT)
Dept: ORTHOPEDICS | Facility: CLINIC | Age: 69
End: 2024-11-05
Payer: MEDICARE

## 2024-11-05 VITALS
DIASTOLIC BLOOD PRESSURE: 66 MMHG | SYSTOLIC BLOOD PRESSURE: 106 MMHG | BODY MASS INDEX: 28.47 KG/M2 | WEIGHT: 145 LBS | HEIGHT: 60 IN | HEART RATE: 62 BPM

## 2024-11-05 DIAGNOSIS — M54.16 LUMBAR RADICULOPATHY, CHRONIC: Primary | ICD-10-CM

## 2024-11-05 PROCEDURE — 3288F FALL RISK ASSESSMENT DOCD: CPT | Mod: CPTII,,, | Performed by: ORTHOPAEDIC SURGERY

## 2024-11-05 PROCEDURE — 1159F MED LIST DOCD IN RCRD: CPT | Mod: CPTII,,, | Performed by: ORTHOPAEDIC SURGERY

## 2024-11-05 PROCEDURE — 3074F SYST BP LT 130 MM HG: CPT | Mod: CPTII,,, | Performed by: ORTHOPAEDIC SURGERY

## 2024-11-05 PROCEDURE — 1101F PT FALLS ASSESS-DOCD LE1/YR: CPT | Mod: CPTII,,, | Performed by: ORTHOPAEDIC SURGERY

## 2024-11-05 PROCEDURE — 99214 OFFICE O/P EST MOD 30 MIN: CPT | Mod: ,,, | Performed by: ORTHOPAEDIC SURGERY

## 2024-11-05 PROCEDURE — 3044F HG A1C LEVEL LT 7.0%: CPT | Mod: CPTII,,, | Performed by: ORTHOPAEDIC SURGERY

## 2024-11-05 PROCEDURE — 3008F BODY MASS INDEX DOCD: CPT | Mod: CPTII,,, | Performed by: ORTHOPAEDIC SURGERY

## 2024-11-05 PROCEDURE — 3078F DIAST BP <80 MM HG: CPT | Mod: CPTII,,, | Performed by: ORTHOPAEDIC SURGERY

## 2024-11-06 NOTE — PROGRESS NOTES
Chief Complaint:   Chief Complaint   Patient presents with    Follow-up     4mo sp Left Total Hip 7/8/2024-10/06/2024.. states doing well overall. She states some pain still presenting in hip  mainly after prolonged movement or certain movement. Finished with PT and doing home exercises.       History of present illness:    History of Present Illness  The patient presents for evaluation of hip pain.    She reports persistent hip pain and is unsure if it is related to her sciatica or the hip itself. The pain in her groin area has slightly improved. However, she experiences difficulty in lifting her leg to put on a sock, indicating a limitation in her range of motion. Despite the pain, she continues to engage in stretching and exercise activities.     She also has a diagnosis of fibromyalgia and mentions that pressing her fingers exacerbates the pain. She is able to perform daily activities without restrictions, although she occasionally experiences discomfort. She notes an improvement in her condition compared to her pre-surgery state.    Past Medical History:   Diagnosis Date    Acne     Anemia     Anxiety     Arm pain, right     Cervical spondylosis with radiculopathy     Depression     Diabetes     Dupuytren's contracture     GERD (gastroesophageal reflux disease)     Hard of hearing     Headache     Heart palpitations     Hyperlipidemia     Hypertension     Irregular heart beat     Osteoporosis     Postoperative hypothyroidism     Shoulder pain     Sleep apnea     does not use CPAP    Unspecified osteoarthritis, unspecified site        Past Surgical History:   Procedure Laterality Date    ABDOMINAL SURGERY      tummy tuck    ADENOIDECTOMY      Dont remember    AUGMENTATION OF BREAST      BLADDER SUSPENSION      BUNIONECTOMY Right     CARPAL TUNNEL RELEASE Bilateral     COLONOSCOPY      COSMETIC SURGERY      DECOMPRESSION OF ULNAR NERVE      ESOPHAGOGASTRODUODENOSCOPY      HIP REPLACEMENT ARTHROPLASTY Left      HIP SURGERY Right     HYSTERECTOMY      vaginal; partial    ROBOTIC ARTHROPLASTY, HIP Left 07/08/2024    Procedure: ROBOTIC ARTHROPLASTY,HIP;  Surgeon: Alexey Wayne MD;  Location: [facility] OR;  Service: Orthopedics;  Laterality: Left;    SPINE SURGERY      THYROIDECTOMY      due to goiter, Hoshimoto's disease    TONSILLECTOMY      TOTAL REPLACEMENT OF CERVICAL INTERVERTEBRAL DISC N/A 12/27/2022    Procedure: REPLACEMENT, INTERVERTEBRAL DISC, CERVICAL, TOTAL;  Surgeon: Daniel Melissa MD;  Location: [facility] OR;  Service: Neurosurgery;  Laterality: N/A;  C5-6, C6-7 TOTAL DISC REPLACEMENT, ACDF IF NECESSARY  NTI //  JAY    TRIGGER FINGER RELEASE      left 3rd finger       Current Outpatient Medications   Medication Sig    ascorbic acid, vitamin C, (VITAMIN C) 1000 MG tablet 0    aspirin (ECOTRIN) 81 MG EC tablet 0    cholecalciferol, vitamin D3, 125 mcg (5,000 unit) capsule Take 5,000 Units by mouth once daily.    coenzyme Q10 100 mg capsule 0    cyanocobalamin (VITAMIN B-12) 1000 MCG tablet Take 1,000 mcg by mouth every Mon, Wed, Fri.    fenofibrate (TRICOR) 54 MG tablet Take 54 mg by mouth once daily.    flecainide (TAMBOCOR) 50 MG Tab Take 50 mg by mouth 2 (two) times daily.    glimepiride (AMARYL) 2 MG tablet Take 4 mg by mouth daily with breakfast. 2mg at bedtime    levothyroxine (SYNTHROID) 137 MCG Tab tablet Take 125 mcg by mouth before breakfast.    magnesium oxide (MAG-OX) 250 mg magnesium Tab 0    metoprolol succinate (TOPROL-XL) 25 MG 24 hr tablet Take 25 mg by mouth 2 (two) times daily.    niacin 500 MG CpSR Take 1,000 mg by mouth every evening.    omega-3 acid ethyl esters (LOVAZA) 1 gram capsule Take 2 capsules by mouth 2 (two) times daily.    pantoprazole (PROTONIX) 20 MG tablet Take 20 mg by mouth once daily.    rosuvastatin (CRESTOR) 10 MG tablet Take 10 mg by mouth once daily.    sertraline (ZOLOFT) 100 MG tablet Take 100 mg by mouth once daily.    vitamin E 400 UNIT capsule Take 400 Units by mouth once  daily.    zinc sulfate (ZINC-15 ORAL) Take 50 mg by mouth once.    bisacodyL (DULCOLAX) 5 mg EC tablet Take 1 tablet (5 mg total) by mouth once daily. (Patient not taking: Reported on 9/25/2024)    ONETOUCH DELICA PLUS LANCET 33 gauge Misc Apply topically.    ONETOUCH VERIO TEST STRIPS Strp SMARTSIG:Via Meter     No current facility-administered medications for this visit.       Review of patient's allergies indicates:   Allergen Reactions    Adhesive tape-silicones        Family History   Problem Relation Name Age of Onset    Diabetes Mellitus Mother [place]     Heart disease Mother [place]     Hyperlipidemia Mother Angelina     Cancer Mother Angelina     Diabetes Mother [place]     Thyroid disease Mother Angelina     Osteoarthritis Mother [place]     Diabetes Mellitus Father Oswaldo Anderson     Heart disease Father Oswaldo Anderson     Hypertension Father Oswaldo Anderson     Diabetes Father Oswaldo Anderson     Osteoarthritis Father Oswaldo Anderson     Arthritis Brother Kamlesh     Diabetes Brother Kamlesh     Arthritis Brother Daniel     Osteoarthritis Brother Daniel     Diabetes Brother Fran        Social History     Socioeconomic History    Marital status:    Tobacco Use    Smoking status: Former     Current packs/day: 0.50     Average packs/day: 0.5 packs/day for 30.0 years (15.0 ttl pk-yrs)     Types: Cigarettes    Smokeless tobacco: Never    Tobacco comments:     Quit 20 yrs+   Substance and Sexual Activity    Alcohol use: Never    Drug use: Never    Sexual activity: Not Currently     Birth control/protection: Post-menopausal     Social Drivers of Health     Financial Resource Strain: Low Risk  (12/28/2022)    Overall Financial Resource Strain (CARDIA)     Difficulty of Paying Living Expenses: Not hard at all   Food Insecurity: Unknown (12/28/2022)    Hunger Vital Sign     Ran Out of Food in the Last Year: Never true   Transportation Needs: No Transportation Needs (12/28/2022)    PRAPARE - Transportation     Lack of Transportation (Medical): No      Lack of Transportation (Non-Medical): No   Housing Stability: Unknown (12/28/2022)    Housing Stability Vital Sign     Unable to Pay for Housing in the Last Year: No     Unstable Housing in the Last Year: No           Review of Systems:    Constitution: Negative for chills, fever, and sweats.  Negative for unexplained weight loss.    HENT:  Negative for headaches and blurry vision.    Cardiovascular:Negative for chest pain or irregular heart beat. Negative for hypertension.    Respiratory:  Negative for cough and shortness of breath.    Gastrointestinal: Negative for abdominal pain, heartburn, melena, nausea, and vomitting.    Genitourinary:  Negative bladder incontinence and dysuria.    Musculoskeletal:  See HPI    Neurological: Negative for numbness.    Psychiatric/Behavioral: Negative for depression.  The patient is not nervous/anxious.      Endocrine: Negative for polyuria    Hematologic/Lymphatic: Negative for bleeding problem.  Does not bruise/bleed easily.    Skin: Negative for poor would healing and rash      Physical Examination:    Vital Signs:    Vitals:    11/05/24 1426   BP: 106/66   Pulse: 62       Body mass index is 28.32 kg/m².    General: No acute distress, alert and oriented, healthy appearing    HEENT: Head is atraumatic, mucous membranes are moist    Neck: Supples, no JVD    Cardiovascular: Palpable dorsalis pedis and posterior tibial pulses, regular rate and rhythm to those pulses    Lungs: Breathing non-labored    Skin: no rashes appreciated    Neurologic: Can flex and extend knees, ankles, and toes. Sensation is grossly intact    Left hip:  With a significant pain it was brisk cap refill disappeared sensation range of motion without significant pain.  Positive straight leg raise.    X-rays:      Assessment::  s/p L Total hip arthroplasty    Plan:  Patient overall doing quite well.  It was hip is calming down.  It was continues to have some sciatic issues.  We will get her into physical therapy  for this.  This fails to relieve her symptoms MRI and get her set up for injections.    This note was generated with the assistance of ambient listening technology. Verbal consent was obtained by the patient and accompanying visitor(s) for the recording of patient appointment to facilitate this note. I attest to having reviewed and edited the generated note for accuracy, though some syntax or spelling errors may persist. Please contact the author of this note for any clarification.      This note was created using [employer] voice recognition software that occasionally misinterpreted phrases or words.    Consult note is delivered via Epic messaging service.

## 2024-11-25 NOTE — PROGRESS NOTES
[facility]  Otolaryngology Clinic Note    Lachelle Alanis  YOB: 1955    Chief Complaint:   Chief Complaint   Patient presents with    Follow-up     F/u 8 weeks audio        HPI: 09/25/2024: 68 y.o. female referred from neurosurgery for dizziness. Reports this has been occurring off/on for a couple of years. She describes this as room spinning which lasts a few seconds. It is exacerbated by lying down and getting up. Endorses HL and tinnitus. States she is supposed to be getting hearing aids but is unable to afford them at this time. Has hearing tests every 6-12 months with Dr. Saunders. Denies hx of otologic infections or procedures/surgeries. Denies loud noise exposure. States parents had hearing loss in their 80s. Denies MVC or any type of head trauma around the time symptoms began.     11/26/2024: F/u after audio & Hallpike. States she had one episode of dizziness following audio visit which lasted 2-3 days and spontaneously resolved.    ROS:   10-point review of systems negative except per HPI      Review of patient's allergies indicates:   Allergen Reactions    Adhesive tape-silicones        Past Medical History:   Diagnosis Date    Acne     Anemia     Anxiety     Arm pain, right     Cervical spondylosis with radiculopathy     Depression     Diabetes     Dupuytren's contracture     GERD (gastroesophageal reflux disease)     Hard of hearing     Headache     Heart palpitations     Hyperlipidemia     Hypertension     Irregular heart beat     Osteoporosis     Postoperative hypothyroidism     Shoulder pain     Sleep apnea     does not use CPAP    Unspecified osteoarthritis, unspecified site        Past Surgical History:   Procedure Laterality Date    ABDOMINAL SURGERY      tummy tuck    ADENOIDECTOMY      Dont remember    AUGMENTATION OF BREAST      BLADDER SUSPENSION      BUNIONECTOMY Right     CARPAL TUNNEL RELEASE Bilateral     COLONOSCOPY      COSMETIC SURGERY       DECOMPRESSION OF ULNAR NERVE      ESOPHAGOGASTRODUODENOSCOPY      HIP REPLACEMENT ARTHROPLASTY Left     HIP SURGERY Right     HYSTERECTOMY      vaginal; partial    ROBOTIC ARTHROPLASTY, HIP Left 07/08/2024    Procedure: ROBOTIC ARTHROPLASTY,HIP;  Surgeon: Alexey Wayne MD;  Location: [facility];  Service: Orthopedics;  Laterality: Left;    SPINE SURGERY      THYROIDECTOMY      due to goiter, Hoshimoto's disease    TONSILLECTOMY      TOTAL REPLACEMENT OF CERVICAL INTERVERTEBRAL DISC N/A 12/27/2022    Procedure: REPLACEMENT, INTERVERTEBRAL DISC, CERVICAL, TOTAL;  Surgeon: Daniel Melissa MD;  Location: [facility] OR;  Service: Neurosurgery;  Laterality: N/A;  C5-6, C6-7 TOTAL DISC REPLACEMENT, ACDF IF NECESSARY  NTI //  JAY    TRIGGER FINGER RELEASE      left 3rd finger       Social History     Socioeconomic History    Marital status:    Tobacco Use    Smoking status: Former     Current packs/day: 0.50     Average packs/day: 0.5 packs/day for 30.0 years (15.0 ttl pk-yrs)     Types: Cigarettes    Smokeless tobacco: Never    Tobacco comments:     Quit 20 yrs+   Substance and Sexual Activity    Alcohol use: Never    Drug use: Never    Sexual activity: Not Currently     Birth control/protection: Post-menopausal     Social Drivers of Health     Financial Resource Strain: Low Risk  (12/28/2022)    Overall Financial Resource Strain (CARDIA)     Difficulty of Paying Living Expenses: Not hard at all   Food Insecurity: Unknown (12/28/2022)    Hunger Vital Sign     Ran Out of Food in the Last Year: Never true   Transportation Needs: No Transportation Needs (12/28/2022)    PRAPARE - Transportation     Lack of Transportation (Medical): No     Lack of Transportation (Non-Medical): No   Housing Stability: Unknown (12/28/2022)    Housing Stability Vital Sign     Unable to Pay for Housing in the Last Year: No     Unstable Housing in the Last Year: No       Family History   Problem Relation Name Age of Onset    Diabetes Mellitus  Mother [place]     Heart disease Mother Angelina     Hyperlipidemia Mother Angelina     Cancer Mother Angelina     Diabetes Mother [place]     Thyroid disease Mother Angelina     Osteoarthritis Mother [place]     Diabetes Mellitus Father Oswaldo Anderson     Heart disease Father Oswaldo Anderson     Hypertension Father Oswaldo Anderson     Diabetes Father Oswaldo Anderson     Osteoarthritis Father Oswaldo Anderson     Arthritis Brother Kamlesh     Diabetes Brother Kamlesh     Arthritis Brother Daniel     Osteoarthritis Brother Daniel     Diabetes Brother Fran        Outpatient Encounter Medications as of 11/26/2024   Medication Sig Dispense Refill    ascorbic acid, vitamin C, (VITAMIN C) 1000 MG tablet 0      aspirin (ECOTRIN) 81 MG EC tablet 0      cephALEXin (KEFLEX) 500 MG capsule Take 500 mg by mouth 4 (four) times daily.      cholecalciferol, vitamin D3, 125 mcg (5,000 unit) capsule Take 5,000 Units by mouth once daily.      coenzyme Q10 100 mg capsule 0      cyanocobalamin (VITAMIN B-12) 1000 MCG tablet Take 1,000 mcg by mouth every Mon, Wed, Fri.      fenofibrate (TRICOR) 54 MG tablet Take 54 mg by mouth once daily.      flecainide (TAMBOCOR) 50 MG Tab Take 50 mg by mouth 2 (two) times daily.      glimepiride (AMARYL) 2 MG tablet Take 4 mg by mouth daily with breakfast. 2mg at bedtime      levothyroxine (SYNTHROID) 137 MCG Tab tablet Take 125 mcg by mouth before breakfast.      magnesium oxide (MAG-OX) 250 mg magnesium Tab 0      metoprolol succinate (TOPROL-XL) 25 MG 24 hr tablet Take 25 mg by mouth 2 (two) times daily.      niacin 500 MG CpSR Take 1,000 mg by mouth every evening.      omega-3 acid ethyl esters (LOVAZA) 1 gram capsule Take 2 capsules by mouth 2 (two) times daily.      pantoprazole (PROTONIX) 20 MG tablet Take 20 mg by mouth once daily.      rosuvastatin (CRESTOR) 10 MG tablet Take 10 mg by mouth once daily.      sertraline (ZOLOFT) 100 MG tablet Take 100 mg by mouth once daily.      vitamin E 400 UNIT capsule Take 400 Units by mouth once daily.       zinc sulfate (ZINC-15 ORAL) Take 50 mg by mouth once.      bisacodyL (DULCOLAX) 5 mg EC tablet Take 1 tablet (5 mg total) by mouth once daily. (Patient not taking: Reported on 9/25/2024)      glucosamine coleman 2KCl-chondroit 750-600 mg Tab 0      ONETOUCH DELICA PLUS LANCET 33 gauge Misc Apply topically.      ONETOUCH VERIO TEST STRIPS Strp SMARTSIG:Via Meter       No facility-administered encounter medications on file as of 11/26/2024.       Physical Exam:  Vitals:    11/26/24 1414   BP: (!) 97/57   BP Location: Right arm   Patient Position: Sitting   Pulse: (!) 58   Temp: 97.9 °F (36.6 °C)   TempSrc: Oral       Physical Exam   General: NAD, voice normal  Neuro: AAO, CN II - XII grossly intact  Head/ Face: NCAT, symmetric, sensations intact bilaterally  Eyes: EOMI, PERRL  Ears: externally normal with grossly normal hearing (wears b/l aids)  AD: EAC patent, TM intact, no middle ear effusion, no retractions  AS: EAC patent, TM intact, no middle ear effusion, no retractions  Nose: bilateral nares patent, midline septum, no rhinorrhea, no external deformity, no turbinate hypertrophy  OC/OP: MMM, no intraoral lesions, no trismus, dentition is moderate, no uvular deviation, bilaterally symmetric soft palate elevation, palatoglossus and palatopharyngeal fold wnl; tonsils are symmetric and 1+  Indirect laryngoscopy: deferred due to patient intolerance  Neck: soft, supple, no LAD, normal ROM, no thyromegaly  Respiratory: nonlabored, no wheezing, bilateral chest rise  Cardiovascular: RRR  Gastrointestinal: S NT ND  Skin: warm, no lesions  Musculoskeletal: 5/5 strength  Psych: Appropriate affect/mood     Pertinent Data:  ? LABS:  ? AUDIO:             ? PATH:      Imaging:   I personally reviewed the following images:        Assessment/Plan:  69 y.o. female with with vertiginous dizziness. Audio with mild to moderately severe SNHL b/l, type A tymps- reviewed. Hallpike negative. Discussed proceeding with VNG vs trying home  vestibular exercises & monitoring symptoms. Will monitor for now.   - Vestibular exercises  - Annual audio  - Safety precautions  - Telemed 6 wks    Cleopatra Serrano NP

## 2024-11-26 ENCOUNTER — OFFICE VISIT (OUTPATIENT)
Dept: OTOLARYNGOLOGY | Facility: CLINIC | Age: 69
End: 2024-11-26
Payer: MEDICARE

## 2024-11-26 VITALS — DIASTOLIC BLOOD PRESSURE: 57 MMHG | HEART RATE: 58 BPM | TEMPERATURE: 98 F | SYSTOLIC BLOOD PRESSURE: 97 MMHG

## 2024-11-26 DIAGNOSIS — R42 DIZZINESS: Primary | ICD-10-CM

## 2024-11-26 DIAGNOSIS — H90.3 SENSORINEURAL HEARING LOSS (SNHL) OF BOTH EARS: ICD-10-CM

## 2024-11-26 PROCEDURE — 3044F HG A1C LEVEL LT 7.0%: CPT | Mod: CPTII,,, | Performed by: NURSE PRACTITIONER

## 2024-11-26 PROCEDURE — 1126F AMNT PAIN NOTED NONE PRSNT: CPT | Mod: CPTII,,, | Performed by: NURSE PRACTITIONER

## 2024-11-26 PROCEDURE — 99213 OFFICE O/P EST LOW 20 MIN: CPT | Mod: S$PBB,,, | Performed by: NURSE PRACTITIONER

## 2024-11-26 PROCEDURE — 99214 OFFICE O/P EST MOD 30 MIN: CPT | Mod: PBBFAC | Performed by: NURSE PRACTITIONER

## 2024-11-26 PROCEDURE — 1101F PT FALLS ASSESS-DOCD LE1/YR: CPT | Mod: CPTII,,, | Performed by: NURSE PRACTITIONER

## 2024-11-26 PROCEDURE — 3078F DIAST BP <80 MM HG: CPT | Mod: CPTII,,, | Performed by: NURSE PRACTITIONER

## 2024-11-26 PROCEDURE — 1159F MED LIST DOCD IN RCRD: CPT | Mod: CPTII,,, | Performed by: NURSE PRACTITIONER

## 2024-11-26 PROCEDURE — 3074F SYST BP LT 130 MM HG: CPT | Mod: CPTII,,, | Performed by: NURSE PRACTITIONER

## 2024-11-26 PROCEDURE — 3288F FALL RISK ASSESSMENT DOCD: CPT | Mod: CPTII,,, | Performed by: NURSE PRACTITIONER

## 2024-11-26 RX ORDER — FLAXSEED OIL 1000 MG
CAPSULE ORAL
COMMUNITY

## 2024-11-26 RX ORDER — CEPHALEXIN 500 MG/1
500 CAPSULE ORAL 4 TIMES DAILY
COMMUNITY
Start: 2024-11-19

## 2025-01-09 ENCOUNTER — OFFICE VISIT (OUTPATIENT)
Dept: ORTHOPEDICS | Facility: CLINIC | Age: 70
End: 2025-01-09
Payer: MEDICARE

## 2025-01-09 ENCOUNTER — LAB VISIT (OUTPATIENT)
Dept: LAB | Facility: HOSPITAL | Age: 70
End: 2025-01-09
Attending: ORTHOPAEDIC SURGERY
Payer: MEDICARE

## 2025-01-09 VITALS
BODY MASS INDEX: 29.45 KG/M2 | SYSTOLIC BLOOD PRESSURE: 137 MMHG | DIASTOLIC BLOOD PRESSURE: 75 MMHG | HEIGHT: 60 IN | WEIGHT: 150 LBS | HEART RATE: 58 BPM

## 2025-01-09 DIAGNOSIS — Z47.1 AFTERCARE FOLLOWING LEFT HIP JOINT REPLACEMENT SURGERY: ICD-10-CM

## 2025-01-09 DIAGNOSIS — Z47.1 AFTERCARE FOLLOWING LEFT HIP JOINT REPLACEMENT SURGERY: Primary | ICD-10-CM

## 2025-01-09 DIAGNOSIS — Z96.642 AFTERCARE FOLLOWING LEFT HIP JOINT REPLACEMENT SURGERY: Primary | ICD-10-CM

## 2025-01-09 DIAGNOSIS — Z96.642 AFTERCARE FOLLOWING LEFT HIP JOINT REPLACEMENT SURGERY: ICD-10-CM

## 2025-01-09 LAB
BASOPHILS # BLD AUTO: 0.01 X10(3)/MCL
BASOPHILS NFR BLD AUTO: 0.3 %
CRP SERPL HS-MCNC: 1.13 MG/L
EOSINOPHIL # BLD AUTO: 0.08 X10(3)/MCL (ref 0–0.9)
EOSINOPHIL NFR BLD AUTO: 2 %
ERYTHROCYTE [DISTWIDTH] IN BLOOD BY AUTOMATED COUNT: 13.1 % (ref 11.5–17)
ERYTHROCYTE [SEDIMENTATION RATE] IN BLOOD: 12 MM/HR (ref 0–20)
HCT VFR BLD AUTO: 36 % (ref 37–47)
HGB BLD-MCNC: 11.9 G/DL (ref 12–16)
IMM GRANULOCYTES # BLD AUTO: 0.01 X10(3)/MCL (ref 0–0.04)
IMM GRANULOCYTES NFR BLD AUTO: 0.3 %
LYMPHOCYTES # BLD AUTO: 1.02 X10(3)/MCL (ref 0.6–4.6)
LYMPHOCYTES NFR BLD AUTO: 25.8 %
MCH RBC QN AUTO: 31.2 PG (ref 27–31)
MCHC RBC AUTO-ENTMCNC: 33.1 G/DL (ref 33–36)
MCV RBC AUTO: 94.2 FL (ref 80–94)
MONOCYTES # BLD AUTO: 0.42 X10(3)/MCL (ref 0.1–1.3)
MONOCYTES NFR BLD AUTO: 10.6 %
NEUTROPHILS # BLD AUTO: 2.41 X10(3)/MCL (ref 2.1–9.2)
NEUTROPHILS NFR BLD AUTO: 61 %
NRBC BLD AUTO-RTO: 0 %
PLATELET # BLD AUTO: 124 X10(3)/MCL (ref 130–400)
PMV BLD AUTO: 12.2 FL (ref 7.4–10.4)
RBC # BLD AUTO: 3.82 X10(6)/MCL (ref 4.2–5.4)
WBC # BLD AUTO: 3.95 X10(3)/MCL (ref 4.5–11.5)

## 2025-01-09 PROCEDURE — 85025 COMPLETE CBC W/AUTO DIFF WBC: CPT

## 2025-01-09 PROCEDURE — 86141 C-REACTIVE PROTEIN HS: CPT

## 2025-01-09 PROCEDURE — 85652 RBC SED RATE AUTOMATED: CPT

## 2025-01-09 PROCEDURE — 36415 COLL VENOUS BLD VENIPUNCTURE: CPT

## 2025-01-09 NOTE — PROGRESS NOTES
Chief Complaint:   Chief Complaint   Patient presents with    Left Hip - Follow-up     F/u Lt UKLWANT Sx 7/8/24 patient states she wish her hip was better and she know it will take a while and the cold is not helping. It does hurt when she move around and when getting up she has to take a minute.        History of present illness:    History of Present Illness  The patient presents for evaluation of left hip pain.    She reports persistent pain in her left hip, which has not shown any improvement. The pain is described as severe and is exacerbated by various activities. Prolonged sitting necessitates a slow transition to standing, followed by a brief period of stationary standing before she can initiate movement. She is uncertain about specific triggers that worsen the pain but notes that immobility provides some relief. She underwent a left hip replacement approximately 4 months ago and continues to experience difficulties. She also has a diagnosis of fibromyalgia, which contributes to her overall inflammation. She was previously prescribed meloxicam, which provided some relief.    MEDICATIONS  Past: Meloxicam    Past Medical History:   Diagnosis Date    Acne     Anemia     Anxiety     Arm pain, right     Cervical spondylosis with radiculopathy     Depression     Diabetes     Dupuytren's contracture     GERD (gastroesophageal reflux disease)     Hard of hearing     Headache     Heart palpitations     Hyperlipidemia     Hypertension     Irregular heart beat     Osteoporosis     Postoperative hypothyroidism     Shoulder pain     Sleep apnea     does not use CPAP    Unspecified osteoarthritis, unspecified site        Past Surgical History:   Procedure Laterality Date    ABDOMINAL SURGERY      tummy tuck    ADENOIDECTOMY      Dont remember    AUGMENTATION OF BREAST      BLADDER SUSPENSION      BUNIONECTOMY Right     CARPAL TUNNEL RELEASE Bilateral     COLONOSCOPY      COSMETIC SURGERY      DECOMPRESSION OF ULNAR NERVE       ESOPHAGOGASTRODUODENOSCOPY      HIP REPLACEMENT ARTHROPLASTY Left     HIP SURGERY Right     HYSTERECTOMY      vaginal; partial    ROBOTIC ARTHROPLASTY, HIP Left 07/08/2024    Procedure: ROBOTIC ARTHROPLASTY,HIP;  Surgeon: Alexey Wayne MD;  Location: [facility] OR;  Service: Orthopedics;  Laterality: Left;    SPINE SURGERY      THYROIDECTOMY      due to goiter, Hoshimoto's disease    TONSILLECTOMY      TOTAL REPLACEMENT OF CERVICAL INTERVERTEBRAL DISC N/A 12/27/2022    Procedure: REPLACEMENT, INTERVERTEBRAL DISC, CERVICAL, TOTAL;  Surgeon: Daniel Melissa MD;  Location: [facility] OR;  Service: Neurosurgery;  Laterality: N/A;  C5-6, C6-7 TOTAL DISC REPLACEMENT, ACDF IF NECESSARY  NTI //  JAY    TRIGGER FINGER RELEASE      left 3rd finger       Current Outpatient Medications   Medication Sig    ascorbic acid, vitamin C, (VITAMIN C) 1000 MG tablet 0    aspirin (ECOTRIN) 81 MG EC tablet 0    cholecalciferol, vitamin D3, 125 mcg (5,000 unit) capsule Take 5,000 Units by mouth once daily.    coenzyme Q10 100 mg capsule 0    cyanocobalamin (VITAMIN B-12) 1000 MCG tablet Take 1,000 mcg by mouth every Mon, Wed, Fri.    fenofibrate (TRICOR) 54 MG tablet Take 54 mg by mouth once daily.    flecainide (TAMBOCOR) 50 MG Tab Take 50 mg by mouth 2 (two) times daily.    glimepiride (AMARYL) 2 MG tablet Take 4 mg by mouth daily with breakfast. 2mg at bedtime    glucosamine coleman 2KCl-chondroit 750-600 mg Tab 0    levothyroxine (SYNTHROID) 137 MCG Tab tablet Take 125 mcg by mouth before breakfast.    magnesium oxide (MAG-OX) 250 mg magnesium Tab 0    metoprolol succinate (TOPROL-XL) 25 MG 24 hr tablet Take 25 mg by mouth 2 (two) times daily.    niacin 500 MG CpSR Take 1,000 mg by mouth every evening.    omega-3 acid ethyl esters (LOVAZA) 1 gram capsule Take 2 capsules by mouth 2 (two) times daily.    ONETOUCH VERIO TEST STRIPS Strp SMARTSIG:Via Meter    pantoprazole (PROTONIX) 20 MG tablet Take 20 mg by mouth once daily.    rosuvastatin  (CRESTOR) 10 MG tablet Take 10 mg by mouth once daily.    sertraline (ZOLOFT) 100 MG tablet Take 100 mg by mouth once daily.    vitamin E 400 UNIT capsule Take 400 Units by mouth once daily.    zinc sulfate (ZINC-15 ORAL) Take 50 mg by mouth once.    bisacodyL (DULCOLAX) 5 mg EC tablet Take 1 tablet (5 mg total) by mouth once daily. (Patient not taking: Reported on 1/9/2025)    cephALEXin (KEFLEX) 500 MG capsule Take 500 mg by mouth 4 (four) times daily.    ONETOUCH DELICA PLUS LANCET 33 gauge Misc Apply topically.     No current facility-administered medications for this visit.       Review of patient's allergies indicates:   Allergen Reactions    Adhesive tape-silicones        Family History   Problem Relation Name Age of Onset    Diabetes Mellitus Mother Angelina     Heart disease Mother [place]     Hyperlipidemia Mother Macclenny     Cancer Mother Macclenny     Diabetes Mother Angelina     Thyroid disease Mother Angelina     Osteoarthritis Mother [place]clenny     Diabetes Mellitus Father Oswaldo Andersno     Heart disease Father Oswaldo Anderson     Hypertension Father Oswaldo Anderson     Diabetes Father Oswaldo Anderson     Osteoarthritis Father Oswaldo Anderson     Arthritis Brother Kamlesh     Diabetes Brother Kamlesh     Arthritis Brother Daniel     Osteoarthritis Brother Daniel     Diabetes Brother Fran        Social History     Socioeconomic History    Marital status:    Tobacco Use    Smoking status: Former     Current packs/day: 0.50     Average packs/day: 0.5 packs/day for 30.0 years (15.0 ttl pk-yrs)     Types: Cigarettes    Smokeless tobacco: Never    Tobacco comments:     Quit 20 yrs+   Substance and Sexual Activity    Alcohol use: Never    Drug use: Never    Sexual activity: Not Currently     Birth control/protection: Post-menopausal     Social Drivers of Health     Financial Resource Strain: Low Risk  (12/28/2022)    Overall Financial Resource Strain (CARDIA)     Difficulty of Paying Living Expenses: Not hard at all   Food Insecurity: Unknown  (12/28/2022)    Hunger Vital Sign     Ran Out of Food in the Last Year: Never true   Transportation Needs: No Transportation Needs (12/28/2022)    PRAPARE - Transportation     Lack of Transportation (Medical): No     Lack of Transportation (Non-Medical): No   Housing Stability: Unknown (12/28/2022)    Housing Stability Vital Sign     Unable to Pay for Housing in the Last Year: No     Unstable Housing in the Last Year: No           Review of Systems:    Constitution: Negative for chills, fever, and sweats.  Negative for unexplained weight loss.    HENT:  Negative for headaches and blurry vision.    Cardiovascular:Negative for chest pain or irregular heart beat. Negative for hypertension.    Respiratory:  Negative for cough and shortness of breath.    Gastrointestinal: Negative for abdominal pain, heartburn, melena, nausea, and vomitting.    Genitourinary:  Negative bladder incontinence and dysuria.    Musculoskeletal:  See HPI    Neurological: Negative for numbness.    Psychiatric/Behavioral: Negative for depression.  The patient is not nervous/anxious.      Endocrine: Negative for polyuria    Hematologic/Lymphatic: Negative for bleeding problem.  Does not bruise/bleed easily.    Skin: Negative for poor would healing and rash      Physical Examination:    Vital Signs:    Vitals:    01/09/25 1442   BP: 137/75   Pulse: (!) 58       Body mass index is 29.29 kg/m².    General: No acute distress, alert and oriented, healthy appearing    HEENT: Head is atraumatic, mucous membranes are moist    Neck: Supples, no JVD    Cardiovascular: Palpable dorsalis pedis and posterior tibial pulses, regular rate and rhythm to those pulses    Lungs: Breathing non-labored    Skin: no rashes appreciated    Neurologic: Can flex and extend knees, ankles, and toes. Sensation is grossly intact    Left hip:  Range of motion of the left hip with significant groin pain.  Patient has no point tenderness.  Internal external rotation do cause the  patient's pain in her groin.    X-rays:  Three views left hip reviewed.  Patient's implants appear well fixed.  No signs of loosening or subsidence noted     Assessment::  Status post left total hip arthroplasty    Plan:  Patient is 6 months out.  It is intact of the her to be improved from where she is right now.  Has a current time, we will hold off on an MRI.  We will work her up for infection with blood work possibly an aspiration to rule this out.  An MRI we would be a viable option in the future if work her up for infection found to be negative.    This note was generated with the assistance of ambient listening technology. Verbal consent was obtained by the patient and accompanying visitor(s) for the recording of patient appointment to facilitate this note. I attest to having reviewed and edited the generated note for accuracy, though some syntax or spelling errors may persist. Please contact the author of this note for any clarification.      This note was created using [employer] voice recognition software that occasionally misinterpreted phrases or words.    Consult note is delivered via Epic messaging service.  .

## 2025-01-15 ENCOUNTER — OFFICE VISIT (OUTPATIENT)
Dept: OTOLARYNGOLOGY | Facility: CLINIC | Age: 70
End: 2025-01-15
Payer: MEDICARE

## 2025-01-15 DIAGNOSIS — R42 DIZZINESS: Primary | ICD-10-CM

## 2025-01-15 DIAGNOSIS — H90.3 SENSORINEURAL HEARING LOSS (SNHL) OF BOTH EARS: ICD-10-CM

## 2025-01-15 RX ORDER — METOPROLOL TARTRATE 25 MG/1
TABLET, FILM COATED ORAL
COMMUNITY

## 2025-01-15 NOTE — PROGRESS NOTES
Audio Only Telehealth Visit     The patient location is: [facility]  The chief complaint leading to consultation is: dizziness  Visit type: Virtual visit with audio only (telephone)  Total time spent on visit: 11 min     The reason for the audio only service rather than synchronous audio and video virtual visit was related to technical difficulties or patient preference/necessity.     Each patient to whom I provide medical services by telemedicine is:  (1) informed of the relationship between the physician and patient and the respective role of any other health care provider with respect to management of the patient; and (2) notified that they may decline to receive medical services by telemedicine and may withdraw from such care at any time. Patient verbally consented to receive this service via voice-only telephone call.       HPI: 09/25/2024: 68 y.o. female referred from neurosurgery for dizziness. Reports this has been occurring off/on for a couple of years. She describes this as room spinning which lasts a few seconds. It is exacerbated by lying down and getting up. Endorses HL and tinnitus. States she is supposed to be getting hearing aids but is unable to afford them at this time. Has hearing tests every 6-12 months with Dr. Saunders. Denies hx of otologic infections or procedures/surgeries. Denies loud noise exposure. States parents had hearing loss in their 80s. Denies MVC or any type of head trauma around the time symptoms began.     11/26/2024: F/u after audio & Hallpike. States she had one episode of dizziness following audio visit which lasted 2-3 days and spontaneously resolved.    01/15/2025: Reports she has not experienced any dizziness since last visit. Continues to wonder what is causing it.     Audio:       Assessment and plan:  69 y.o. female with with vertiginous dizziness. Audio with mild to moderately severe SNHL b/l, type A tymps- reviewed. Hallpike negative.   - Continue vestibular  exercises  - Annual audio  - Safety precautions  - RTC 10mo, sooner if dizziness returns    Cleopatra Serrano NP      This service was not originating from a related E/M service provided within the previous 7 days nor will  to an E/M service or procedure within the next 24 hours or my soonest available appointment.  Prevailing standard of care was able to be met in this audio-only visit.

## 2025-02-11 ENCOUNTER — HOSPITAL ENCOUNTER (OUTPATIENT)
Dept: RADIOLOGY | Facility: CLINIC | Age: 70
Discharge: HOME | End: 2025-02-11
Attending: ORTHOPAEDIC SURGERY
Payer: MEDICARE

## 2025-02-11 ENCOUNTER — OFFICE VISIT (OUTPATIENT)
Dept: ORTHOPEDICS | Facility: CLINIC | Age: 70
End: 2025-02-11
Payer: MEDICARE

## 2025-02-11 VITALS
SYSTOLIC BLOOD PRESSURE: 123 MMHG | BODY MASS INDEX: 28.66 KG/M2 | HEART RATE: 62 BPM | DIASTOLIC BLOOD PRESSURE: 83 MMHG | WEIGHT: 146 LBS | HEIGHT: 60 IN

## 2025-02-11 DIAGNOSIS — Z47.1 AFTERCARE FOLLOWING LEFT HIP JOINT REPLACEMENT SURGERY: Primary | ICD-10-CM

## 2025-02-11 DIAGNOSIS — Z96.642 AFTERCARE FOLLOWING LEFT HIP JOINT REPLACEMENT SURGERY: Primary | ICD-10-CM

## 2025-02-11 DIAGNOSIS — Z47.1 AFTERCARE FOLLOWING LEFT HIP JOINT REPLACEMENT SURGERY: ICD-10-CM

## 2025-02-11 DIAGNOSIS — Z96.642 AFTERCARE FOLLOWING LEFT HIP JOINT REPLACEMENT SURGERY: ICD-10-CM

## 2025-02-11 PROCEDURE — 73502 X-RAY EXAM HIP UNI 2-3 VIEWS: CPT | Mod: LT,,, | Performed by: ORTHOPAEDIC SURGERY

## 2025-02-11 RX ORDER — OXYBUTYNIN CHLORIDE 5 MG/1
5 TABLET, EXTENDED RELEASE ORAL DAILY
COMMUNITY
Start: 2025-02-10

## 2025-02-11 RX ORDER — NITROFURANTOIN 25; 75 MG/1; MG/1
100 CAPSULE ORAL 2 TIMES DAILY
COMMUNITY
Start: 2025-02-10

## 2025-02-11 NOTE — PROGRESS NOTES
Chief Complaint:   Chief Complaint   Patient presents with    Results     Results - Lt KULWANT sx 7/8/24 - Pt still having constant pain in Lt hip. Hip gets stiff sitting down for too long. Reports aching pain during some activities.        History of present illness:    History of Present Illness  The patient presents for evaluation of groin pain.    She reports experiencing persistent groin pain, which she recently discussed with her son. The pain is particularly noticeable when she stands up after sitting for approximately an hour, necessitating slow movements initially. However, the discomfort subsides after a few seconds. She also experiences pain when leaning against her bed to put on her socks and shoes. Additionally, she mentions that her leg feels weak when she places her foot on the running board to enter her car, requiring her to hold onto the steering wheel for support.    Past Medical History:   Diagnosis Date    Acne     Anemia     Anxiety     Arm pain, right     Cervical spondylosis with radiculopathy     Depression     Diabetes     Dupuytren's contracture     GERD (gastroesophageal reflux disease)     Hard of hearing     Headache     Heart palpitations     Hyperlipidemia     Hypertension     Irregular heart beat     Osteoporosis     Postoperative hypothyroidism     Shoulder pain     Sleep apnea     does not use CPAP    Unspecified osteoarthritis, unspecified site        Past Surgical History:   Procedure Laterality Date    ABDOMINAL SURGERY      tummy tuck    ADENOIDECTOMY      Dont remember    AUGMENTATION OF BREAST      BLADDER SUSPENSION      BUNIONECTOMY Right     CARPAL TUNNEL RELEASE Bilateral     COLONOSCOPY      COSMETIC SURGERY      DECOMPRESSION OF ULNAR NERVE      ESOPHAGOGASTRODUODENOSCOPY      HIP REPLACEMENT ARTHROPLASTY Left     HIP SURGERY Right     HYSTERECTOMY      vaginal; partial    ROBOTIC ARTHROPLASTY, HIP Left 07/08/2024    Procedure: ROBOTIC ARTHROPLASTY,HIP;  Surgeon: Tone  Alexey DODSON MD;  Location: [facility] OR;  Service: Orthopedics;  Laterality: Left;    SPINE SURGERY      THYROIDECTOMY      due to goiter, Hoshimoto's disease    TONSILLECTOMY      TOTAL REPLACEMENT OF CERVICAL INTERVERTEBRAL DISC N/A 12/27/2022    Procedure: REPLACEMENT, INTERVERTEBRAL DISC, CERVICAL, TOTAL;  Surgeon: Daniel Melissa MD;  Location: [facility] OR;  Service: Neurosurgery;  Laterality: N/A;  C5-6, C6-7 TOTAL DISC REPLACEMENT, ACDF IF NECESSARY  NTI //  JAY    TRIGGER FINGER RELEASE      left 3rd finger       Current Outpatient Medications   Medication Sig    ascorbic acid, vitamin C, (VITAMIN C) 1000 MG tablet 0    aspirin (ECOTRIN) 81 MG EC tablet 0    bisacodyL (DULCOLAX) 5 mg EC tablet Take 1 tablet (5 mg total) by mouth once daily.    coenzyme Q10 100 mg capsule 0    fenofibrate (TRICOR) 54 MG tablet Take 54 mg by mouth once daily.    flecainide (TAMBOCOR) 50 MG Tab Take 50 mg by mouth 2 (two) times daily.    glimepiride (AMARYL) 2 MG tablet Take 4 mg by mouth daily with breakfast. 2mg at bedtime    glucosamine coleman 2KCl-chondroit 750-600 mg Tab 0    levothyroxine (SYNTHROID) 137 MCG Tab tablet Take 125 mcg by mouth before breakfast.    magnesium oxide (MAG-OX) 250 mg magnesium Tab 0    metoprolol succinate (TOPROL-XL) 25 MG 24 hr tablet Take 25 mg by mouth 2 (two) times daily.    metoprolol tartrate (LOPRESSOR) 25 MG tablet     niacin 500 MG CpSR Take 1,000 mg by mouth every evening.    nitrofurantoin, macrocrystal-monohydrate, (MACROBID) 100 MG capsule Take 100 mg by mouth 2 (two) times daily.    ONETOUCH DELICA PLUS LANCET 33 gauge Misc Apply topically.    ONETOUCH VERIO TEST STRIPS Strp SMARTSIG:Via Meter    oxybutynin (DITROPAN-XL) 5 MG TR24 Take 5 mg by mouth once daily.    pantoprazole (PROTONIX) 20 MG tablet Take 20 mg by mouth once daily.    rosuvastatin (CRESTOR) 10 MG tablet Take 10 mg by mouth once daily.    sertraline (ZOLOFT) 100 MG tablet Take 100 mg by mouth once daily.    cephALEXin (KEFLEX) 500  MG capsule Take 500 mg by mouth 4 (four) times daily.    cholecalciferol, vitamin D3, 125 mcg (5,000 unit) capsule Take 5,000 Units by mouth once daily. (Patient not taking: Reported on 2/11/2025)    cyanocobalamin (VITAMIN B-12) 1000 MCG tablet Take 1,000 mcg by mouth every Mon, Wed, Fri. (Patient not taking: Reported on 2/11/2025)    omega-3 acid ethyl esters (LOVAZA) 1 gram capsule Take 2 capsules by mouth 2 (two) times daily. (Patient not taking: Reported on 2/11/2025)    vitamin E 400 UNIT capsule Take 400 Units by mouth once daily. (Patient not taking: Reported on 2/11/2025)    zinc sulfate (ZINC-15 ORAL) Take 50 mg by mouth once. (Patient not taking: Reported on 2/11/2025)     No current facility-administered medications for this visit.       Review of patient's allergies indicates:   Allergen Reactions    Adhesive tape-silicones        Family History   Problem Relation Name Age of Onset    Diabetes Mellitus Mother Angelina     Heart disease Mother [place]     Hyperlipidemia Mother Angelina     Cancer Mother Angelina     Diabetes Mother Angelina     Thyroid disease Mother [place]     Osteoarthritis Mother Angelina     Diabetes Mellitus Father Oswaldo Anderson     Heart disease Father Oswaldo Anderson     Hypertension Father Oswaldo Anderson     Diabetes Father Oswaldo Anderson     Osteoarthritis Father Oswaldo Anderson     Arthritis Brother Kamlesh     Diabetes Brother Kamlesh     Arthritis Brother Daniel     Osteoarthritis Brother Daniel     Diabetes Brother Fran        Social History     Socioeconomic History    Marital status:    Tobacco Use    Smoking status: Former     Current packs/day: 0.50     Average packs/day: 0.5 packs/day for 30.0 years (15.0 ttl pk-yrs)     Types: Cigarettes    Smokeless tobacco: Never    Tobacco comments:     Quit 20 yrs+   Substance and Sexual Activity    Alcohol use: Never    Drug use: Never    Sexual activity: Not Currently     Birth control/protection: Post-menopausal     Social Drivers of Health     Financial Resource  Strain: Low Risk  (12/28/2022)    Overall Financial Resource Strain (CARDIA)     Difficulty of Paying Living Expenses: Not hard at all   Food Insecurity: Unknown (12/28/2022)    Hunger Vital Sign     Ran Out of Food in the Last Year: Never true   Transportation Needs: No Transportation Needs (12/28/2022)    PRAPARE - Transportation     Lack of Transportation (Medical): No     Lack of Transportation (Non-Medical): No   Housing Stability: Unknown (12/28/2022)    Housing Stability Vital Sign     Unable to Pay for Housing in the Last Year: No     Unstable Housing in the Last Year: No           Review of Systems:    Constitution: Negative for chills, fever, and sweats.  Negative for unexplained weight loss.    HENT:  Negative for headaches and blurry vision.    Cardiovascular:Negative for chest pain or irregular heart beat. Negative for hypertension.    Respiratory:  Negative for cough and shortness of breath.    Gastrointestinal: Negative for abdominal pain, heartburn, melena, nausea, and vomitting.    Genitourinary:  Negative bladder incontinence and dysuria.    Musculoskeletal:  See HPI    Neurological: Negative for numbness.    Psychiatric/Behavioral: Negative for depression.  The patient is not nervous/anxious.      Endocrine: Negative for polyuria    Hematologic/Lymphatic: Negative for bleeding problem.  Does not bruise/bleed easily.    Skin: Negative for poor would healing and rash      Physical Examination:    Vital Signs:    Vitals:    02/11/25 1509   BP: 123/83   Pulse: 62       Body mass index is 28.51 kg/m².    General: No acute distress, alert and oriented, healthy appearing    HEENT: Head is atraumatic, mucous membranes are moist    Neck: Supples, no JVD    Cardiovascular: Palpable dorsalis pedis and posterior tibial pulses, regular rate and rhythm to those pulses    Lungs: Breathing non-labored    Skin: no rashes appreciated    Neurologic: Can flex and extend knees, ankles, and toes. Sensation is grossly  intact    Left hip:  Range of motion left hip without significant or severe pain.  Brisk capillary refill disappeared sensation intact distally.  Internal and external rotation of the hip does cause her some groin pain    X-rays:  Three views left hip reviewed.  Patient.  It is has a radiolucency in his developing around her acetabular component.  Femoral component.  Well-fixed.     Assessment::  Painful left total hip arthroplasty    Plan:  Discussed all treatment with the patient.  Patient with possible some early loosening of her acetabular component.  No signs of infection.  Blood work is normal.  We will plan to see her back in a few months with repeat x-rays.  If he would get a mars MRI of the left hip if she continues to have significant severe pain.    This note was generated with the assistance of ambient listening technology. Verbal consent was obtained by the patient and accompanying visitor(s) for the recording of patient appointment to facilitate this note. I attest to having reviewed and edited the generated note for accuracy, though some syntax or spelling errors may persist. Please contact the author of this note for any clarification.      This note was created using [employer] voice recognition software that occasionally misinterpreted phrases or words.    Consult note is delivered via Epic messaging service.

## 2025-05-05 DIAGNOSIS — D50.9 IRON DEFICIENCY ANEMIA, UNSPECIFIED IRON DEFICIENCY ANEMIA TYPE: Primary | ICD-10-CM

## 2025-05-20 ENCOUNTER — OFFICE VISIT (OUTPATIENT)
Dept: HEMATOLOGY/ONCOLOGY | Facility: CLINIC | Age: 70
End: 2025-05-20
Payer: MEDICARE

## 2025-05-20 VITALS
HEART RATE: 57 BPM | WEIGHT: 154.31 LBS | SYSTOLIC BLOOD PRESSURE: 131 MMHG | HEIGHT: 60 IN | OXYGEN SATURATION: 97 % | DIASTOLIC BLOOD PRESSURE: 78 MMHG | BODY MASS INDEX: 30.29 KG/M2 | RESPIRATION RATE: 18 BRPM

## 2025-05-20 DIAGNOSIS — D61.818 PANCYTOPENIA: Primary | ICD-10-CM

## 2025-05-20 DIAGNOSIS — D50.9 IRON DEFICIENCY ANEMIA, UNSPECIFIED IRON DEFICIENCY ANEMIA TYPE: ICD-10-CM

## 2025-05-20 DIAGNOSIS — E61.1 IRON DEFICIENCY: ICD-10-CM

## 2025-05-20 LAB
BASOPHILS # BLD AUTO: 0.02 X10(3)/MCL
BASOPHILS NFR BLD AUTO: 0.5 %
EOSINOPHIL # BLD AUTO: 0.09 X10(3)/MCL (ref 0–0.9)
EOSINOPHIL NFR BLD AUTO: 2.1 %
ERYTHROCYTE [DISTWIDTH] IN BLOOD BY AUTOMATED COUNT: 12.4 % (ref 11.5–17)
FERRITIN SERPL-MCNC: 22.85 NG/ML (ref 4.63–204)
HCT VFR BLD AUTO: 36.7 % (ref 37–47)
HGB BLD-MCNC: 12.1 G/DL (ref 12–16)
IMM GRANULOCYTES # BLD AUTO: 0 X10(3)/MCL (ref 0–0.04)
IMM GRANULOCYTES NFR BLD AUTO: 0 %
IRON SATN MFR SERPL: 17 % (ref 20–50)
IRON SERPL-MCNC: 63 UG/DL (ref 50–170)
LYMPHOCYTES # BLD AUTO: 1.24 X10(3)/MCL (ref 0.6–4.6)
LYMPHOCYTES NFR BLD AUTO: 29.4 %
MCH RBC QN AUTO: 30.8 PG (ref 27–31)
MCHC RBC AUTO-ENTMCNC: 33 G/DL (ref 33–36)
MCV RBC AUTO: 93.4 FL (ref 80–94)
MONOCYTES # BLD AUTO: 0.5 X10(3)/MCL (ref 0.1–1.3)
MONOCYTES NFR BLD AUTO: 11.8 %
NEUTROPHILS # BLD AUTO: 2.37 X10(3)/MCL (ref 2.1–9.2)
NEUTROPHILS NFR BLD AUTO: 56.2 %
PLATELET # BLD AUTO: 107 X10(3)/MCL (ref 130–400)
PMV BLD AUTO: 11.4 FL (ref 7.4–10.4)
RBC # BLD AUTO: 3.93 X10(6)/MCL (ref 4.2–5.4)
TIBC SERPL-MCNC: 305 UG/DL (ref 70–310)
TIBC SERPL-MCNC: 368 UG/DL (ref 250–450)
TRANSFERRIN SERPL-MCNC: 341 MG/DL (ref 173–360)
WBC # BLD AUTO: 4.22 X10(3)/MCL (ref 4.5–11.5)

## 2025-05-20 PROCEDURE — 3078F DIAST BP <80 MM HG: CPT | Mod: CPTII,S$GLB,, | Performed by: INTERNAL MEDICINE

## 2025-05-20 PROCEDURE — 3075F SYST BP GE 130 - 139MM HG: CPT | Mod: CPTII,S$GLB,, | Performed by: INTERNAL MEDICINE

## 2025-05-20 PROCEDURE — 99999 PR PBB SHADOW E&M-EST. PATIENT-LVL V: CPT | Mod: PBBFAC,,, | Performed by: INTERNAL MEDICINE

## 2025-05-20 PROCEDURE — 1160F RVW MEDS BY RX/DR IN RCRD: CPT | Mod: CPTII,S$GLB,, | Performed by: INTERNAL MEDICINE

## 2025-05-20 PROCEDURE — 82728 ASSAY OF FERRITIN: CPT | Performed by: INTERNAL MEDICINE

## 2025-05-20 PROCEDURE — 3288F FALL RISK ASSESSMENT DOCD: CPT | Mod: CPTII,S$GLB,, | Performed by: INTERNAL MEDICINE

## 2025-05-20 PROCEDURE — 85025 COMPLETE CBC W/AUTO DIFF WBC: CPT | Performed by: INTERNAL MEDICINE

## 2025-05-20 PROCEDURE — 82525 ASSAY OF COPPER: CPT | Performed by: INTERNAL MEDICINE

## 2025-05-20 PROCEDURE — 3008F BODY MASS INDEX DOCD: CPT | Mod: CPTII,S$GLB,, | Performed by: INTERNAL MEDICINE

## 2025-05-20 PROCEDURE — 1159F MED LIST DOCD IN RCRD: CPT | Mod: CPTII,S$GLB,, | Performed by: INTERNAL MEDICINE

## 2025-05-20 PROCEDURE — 1101F PT FALLS ASSESS-DOCD LE1/YR: CPT | Mod: CPTII,S$GLB,, | Performed by: INTERNAL MEDICINE

## 2025-05-20 PROCEDURE — 36415 COLL VENOUS BLD VENIPUNCTURE: CPT | Performed by: INTERNAL MEDICINE

## 2025-05-20 PROCEDURE — 99204 OFFICE O/P NEW MOD 45 MIN: CPT | Mod: S$GLB,,, | Performed by: INTERNAL MEDICINE

## 2025-05-20 PROCEDURE — 83540 ASSAY OF IRON: CPT | Performed by: INTERNAL MEDICINE

## 2025-05-20 RX ORDER — SOLIFENACIN SUCCINATE 5 MG/1
5 TABLET, FILM COATED ORAL EVERY MORNING
COMMUNITY

## 2025-05-20 RX ORDER — ESTRADIOL 0.1 MG/G
CREAM VAGINAL
COMMUNITY
Start: 2025-03-26

## 2025-05-20 RX ORDER — ZINC GLUCONATE 50 MG
TABLET ORAL
COMMUNITY

## 2025-05-20 NOTE — PROGRESS NOTES
Subjective:       Patient ID: Lachelle Alanis is a 69 y.o. female.    Chief Complaint: new patient (Patient has no concerns today)      Diagnosis:  Pancytopenia  Iron-deficiency    Current Treatment:   As needed iron infusions  Further treatment pending bone marrow biopsy    Treatment History:  Iron infusions    HPI:  69-year-old female with a history of iron-deficiency, previously followed with Hematology.  Her hematologist retired, she did not establish care with a new hematologist.  She has undergone extensive GI workup in the past, found to have multiple colon polyps but no active bleeding or cause for iron-deficiency anemia.    She was seen by her primary care physician, had blood work done on 04/24/2025, revealed a white blood cell count of 3.6, hemoglobin 11.8, hematocrit of 36.7, MCV of 96.1 and a platelet count of a 109,000. She was referred to Hematology.  I originally saw her on 05/20/2025.  She stated that she had back pain, unexplained weight change, decreased appetite.  She denied any blood in her stool.    Interval History:   Initial consult note.      Past Medical History:   Diagnosis Date    Acne     Anemia     Anxiety     Arm pain, right     Cervical spondylosis with radiculopathy     Depression     Diabetes     Dupuytren's contracture     GERD (gastroesophageal reflux disease)     Hard of hearing     Headache     Heart palpitations     Hyperlipidemia     Hypertension     Irregular heart beat     Osteoporosis     Postoperative hypothyroidism     Shoulder pain     Sleep apnea     does not use CPAP    Unspecified osteoarthritis, unspecified site       Past Surgical History:   Procedure Laterality Date    ABDOMINAL SURGERY      tummy tuck    ADENOIDECTOMY      Dont remember    AUGMENTATION OF BREAST      BLADDER SUSPENSION      BUNIONECTOMY Right     CARPAL TUNNEL RELEASE Bilateral     COLONOSCOPY      COSMETIC SURGERY      DECOMPRESSION OF ULNAR NERVE      ESOPHAGOGASTRODUODENOSCOPY      HIP  REPLACEMENT ARTHROPLASTY Left     HIP SURGERY Right     HYSTERECTOMY      vaginal; partial    ROBOTIC ARTHROPLASTY, HIP Left 07/08/2024    Procedure: ROBOTIC ARTHROPLASTY,HIP;  Surgeon: Alexey Wayne MD;  Location: [facility];  Service: Orthopedics;  Laterality: Left;    SPINE SURGERY      THYROIDECTOMY      due to goiter, Hoshimoto's disease    TONSILLECTOMY      TOTAL REPLACEMENT OF CERVICAL INTERVERTEBRAL DISC N/A 12/27/2022    Procedure: REPLACEMENT, INTERVERTEBRAL DISC, CERVICAL, TOTAL;  Surgeon: Daniel Melissa MD;  Location: [facility] OR;  Service: Neurosurgery;  Laterality: N/A;  C5-6, C6-7 TOTAL DISC REPLACEMENT, ACDF IF NECESSARY  NTI //  JAY    TRIGGER FINGER RELEASE      left 3rd finger     Social History     Socioeconomic History    Marital status:    Tobacco Use    Smoking status: Former     Current packs/day: 0.50     Average packs/day: 0.5 packs/day for 30.0 years (15.0 ttl pk-yrs)     Types: Cigarettes    Smokeless tobacco: Never    Tobacco comments:     Quit 20 yrs+   Substance and Sexual Activity    Alcohol use: Never    Drug use: Never    Sexual activity: Not Currently     Birth control/protection: Post-menopausal     Social Drivers of Health     Financial Resource Strain: Low Risk  (12/28/2022)    Overall Financial Resource Strain (CARDIA)     Difficulty of Paying Living Expenses: Not hard at all   Food Insecurity: Unknown (12/28/2022)    Hunger Vital Sign     Ran Out of Food in the Last Year: Never true   Transportation Needs: No Transportation Needs (12/28/2022)    PRAPARE - Transportation     Lack of Transportation (Medical): No     Lack of Transportation (Non-Medical): No   Housing Stability: Unknown (12/28/2022)    Housing Stability Vital Sign     Unable to Pay for Housing in the Last Year: No     Unstable Housing in the Last Year: No      Family History   Problem Relation Name Age of Onset    Diabetes Mellitus Mother [place]     Heart disease Mother Angelina     Hyperlipidemia Mother  [place]     Cancer Mother Angelina     Diabetes Mother [place]     Thyroid disease Mother Angelina     Osteoarthritis Mother Angelina     Diabetes Mellitus Father Oswaldo Anderson     Heart disease Father Oswaldo Anderson     Hypertension Father Oswaldo Anderson     Diabetes Father Oswaldo Anderson     Osteoarthritis Father Oswaldo Anderson     Arthritis Brother Kamlesh     Diabetes Brother Kamlesh     Arthritis Brother Daniel     Osteoarthritis Brother Daniel     Diabetes Brother Fran       Review of patient's allergies indicates:   Allergen Reactions    Adhesive tape-silicones       Review of Systems   Constitutional:  Positive for appetite change and unexpected weight change.   HENT:  Negative for mouth sores.    Eyes:  Negative for visual disturbance.   Respiratory:  Negative for cough and shortness of breath.    Cardiovascular:  Negative for chest pain.   Gastrointestinal:  Negative for abdominal pain and diarrhea.   Genitourinary:  Negative for frequency.   Musculoskeletal:  Positive for back pain.   Integumentary:  Negative for rash.   Neurological:  Negative for headaches.   Hematological:  Negative for adenopathy.   Psychiatric/Behavioral:  The patient is nervous/anxious.          Objective:      Physical Exam  Vitals reviewed. Exam conducted with a chaperone present.   Constitutional:       General: She is not in acute distress.     Appearance: Normal appearance.   HENT:      Head: Normocephalic and atraumatic.      Nose: Nose normal.      Mouth/Throat:      Mouth: Mucous membranes are moist.   Eyes:      Extraocular Movements: Extraocular movements intact.      Conjunctiva/sclera: Conjunctivae normal.   Cardiovascular:      Rate and Rhythm: Normal rate and regular rhythm.      Pulses: Normal pulses.      Heart sounds: Normal heart sounds.   Pulmonary:      Effort: Pulmonary effort is normal.      Breath sounds: Normal breath sounds.   Abdominal:      General: Bowel sounds are normal.      Palpations: Abdomen is soft.   Musculoskeletal:         General: No  swelling. Normal range of motion.      Cervical back: Normal range of motion and neck supple.      Right lower leg: No edema.      Left lower leg: No edema.   Lymphadenopathy:      Cervical: No cervical adenopathy.   Skin:     General: Skin is warm and dry.   Neurological:      General: No focal deficit present.      Mental Status: She is alert and oriented to person, place, and time. Mental status is at baseline.   Psychiatric:         Mood and Affect: Mood normal.         Behavior: Behavior normal.         LABS AND IMAGING REVIEWED IN EPIC          Assessment:   As needed iron infusions  Further treatment pending bone marrow biopsy        Plan:       Had a long discussion with the patient, I explained that I do not think she had anything acute due to the fact that this has been going on for years. I explained that a bone marrow biopsy would rule out any bone marrow pathology such as MDS.  Patient voiced understanding.    CBC today shows hemoglobin of 12.1, white blood cell count 4.22, platelet count 107,000, MCV 93.4.    I will check a copper level.    Bone marrow biopsy ordered.    Repeat iron studies pending. Will plan to set her up for IV iron with Injectafer. Will also premedicate with IV Pepcid and steroids.    Patient reports up-to-date with screening colonoscopy     I will see her back in about 5 weeks to discuss bone marrow biopsy results.    David Kline II, MD I, Diane Franco LPN, acted solely as a scribe for and in the presence of, Dr. David Kline who performed the service.

## 2025-05-21 PROBLEM — E61.1 IRON DEFICIENCY: Status: ACTIVE | Noted: 2025-05-21

## 2025-05-21 LAB — COPPER SERPL-MCNC: 80 MCG/DL (ref 77–206)

## 2025-05-21 RX ORDER — SODIUM CHLORIDE 0.9 % (FLUSH) 0.9 %
10 SYRINGE (ML) INJECTION
OUTPATIENT
Start: 2025-05-26

## 2025-05-21 RX ORDER — HEPARIN 100 UNIT/ML
500 SYRINGE INTRAVENOUS
OUTPATIENT
Start: 2025-05-26

## 2025-05-21 RX ORDER — EPINEPHRINE 0.3 MG/.3ML
0.3 INJECTION SUBCUTANEOUS ONCE AS NEEDED
OUTPATIENT
Start: 2025-05-26

## 2025-05-21 RX ORDER — FAMOTIDINE 10 MG/ML
20 INJECTION, SOLUTION INTRAVENOUS
OUTPATIENT
Start: 2025-05-26 | End: 2025-05-26

## 2025-05-29 ENCOUNTER — HOSPITAL ENCOUNTER (OUTPATIENT)
Dept: RADIOLOGY | Facility: CLINIC | Age: 70
Discharge: HOME | End: 2025-05-29
Attending: ORTHOPAEDIC SURGERY
Payer: MEDICARE

## 2025-05-29 ENCOUNTER — OFFICE VISIT (OUTPATIENT)
Dept: ORTHOPEDICS | Facility: CLINIC | Age: 70
End: 2025-05-29
Payer: MEDICARE

## 2025-05-29 VITALS
BODY MASS INDEX: 30.79 KG/M2 | DIASTOLIC BLOOD PRESSURE: 70 MMHG | HEART RATE: 59 BPM | WEIGHT: 156.81 LBS | HEIGHT: 60 IN | SYSTOLIC BLOOD PRESSURE: 117 MMHG

## 2025-05-29 DIAGNOSIS — Z47.1 AFTERCARE FOLLOWING LEFT HIP JOINT REPLACEMENT SURGERY: Primary | ICD-10-CM

## 2025-05-29 DIAGNOSIS — Z47.1 AFTERCARE FOLLOWING LEFT HIP JOINT REPLACEMENT SURGERY: ICD-10-CM

## 2025-05-29 DIAGNOSIS — Z96.642 AFTERCARE FOLLOWING LEFT HIP JOINT REPLACEMENT SURGERY: ICD-10-CM

## 2025-05-29 DIAGNOSIS — M75.02 ADHESIVE CAPSULITIS OF LEFT SHOULDER: ICD-10-CM

## 2025-05-29 DIAGNOSIS — Z96.642 AFTERCARE FOLLOWING LEFT HIP JOINT REPLACEMENT SURGERY: Primary | ICD-10-CM

## 2025-05-29 PROCEDURE — 73502 X-RAY EXAM HIP UNI 2-3 VIEWS: CPT | Mod: LT,,, | Performed by: ORTHOPAEDIC SURGERY

## 2025-05-29 NOTE — PROGRESS NOTES
Chief Complaint:   Chief Complaint   Patient presents with    Left Hip - Follow-up     10.5mo f/u Lt KULWANT sx 7/8/24. PT states she's doing good, getting stronger. Reporting stiffness after sitting for a while, some pain when climbing into her car.        History of present illness:    History of Present Illness  The patient presents for evaluation of shoulder pain and trigger finger.    She reports an improvement in her hip condition but expresses concern due to a recent fall. Approximately 3 to 4 weeks ago, she experienced a fall while attempting to exit the bathtub, during which she sustained injuries to her shoulder and elbow. She initially suspected a fracture in her elbow due to the presence of bruising and significant pain. She has not sought medical attention for these injuries, nor has she undergone any radiographic examinations. Persistent pain in her shoulder, which was present prior to the fall, has since intensified. The pain is severe enough to limit mobility, particularly when performing tasks such as brushing her hair or reaching behind her back. She also reports occasional sensations of instability in the shoulder joint, as if it might dislocate. Despite the pain, she retains some degree of movement in the shoulder.    Additionally, she reports that all her fingers are becoming trigger fingers, with one finger hurting significantly and getting stuck. She has had surgery for a trigger finger, but it scarred down. The surgeon went in and released it, which activated the Dupuytren's contracture.    PAST SURGICAL HISTORY:  Trigger finger surgery.    Past Medical History:   Diagnosis Date    Acne     Anemia     Anxiety     Arm pain, right     Cervical spondylosis with radiculopathy     Depression     Diabetes     Dupuytren's contracture     GERD (gastroesophageal reflux disease)     Hard of hearing     Headache     Heart palpitations     Hyperlipidemia     Hypertension     Irregular heart beat      Osteoporosis     Postoperative hypothyroidism     Shoulder pain     Sleep apnea     does not use CPAP    Unspecified osteoarthritis, unspecified site        Past Surgical History:   Procedure Laterality Date    ABDOMINAL SURGERY      tummy tuck    ADENOIDECTOMY      Dont remember    AUGMENTATION OF BREAST      BLADDER SUSPENSION      BUNIONECTOMY Right     CARPAL TUNNEL RELEASE Bilateral     COLONOSCOPY      COSMETIC SURGERY      DECOMPRESSION OF ULNAR NERVE      ESOPHAGOGASTRODUODENOSCOPY      HIP REPLACEMENT ARTHROPLASTY Left     HIP SURGERY Right     HYSTERECTOMY      vaginal; partial    ROBOTIC ARTHROPLASTY, HIP Left 07/08/2024    Procedure: ROBOTIC ARTHROPLASTY,HIP;  Surgeon: Alexey Wayne MD;  Location: [facility] OR;  Service: Orthopedics;  Laterality: Left;    SPINE SURGERY      THYROIDECTOMY      due to goiter, Hoshimoto's disease    TONSILLECTOMY      TOTAL REPLACEMENT OF CERVICAL INTERVERTEBRAL DISC N/A 12/27/2022    Procedure: REPLACEMENT, INTERVERTEBRAL DISC, CERVICAL, TOTAL;  Surgeon: Daniel Melissa MD;  Location: [facility] OR;  Service: Neurosurgery;  Laterality: N/A;  C5-6, C6-7 TOTAL DISC REPLACEMENT, ACDF IF NECESSARY  NTI //  JAY    TRIGGER FINGER RELEASE      left 3rd finger       Current Outpatient Medications   Medication Sig    ascorbic acid, vitamin C, (VITAMIN C) 1000 MG tablet 0    aspirin (ECOTRIN) 81 MG EC tablet 0    bisacodyL (DULCOLAX) 5 mg EC tablet Take 1 tablet (5 mg total) by mouth once daily.    cholecalciferol, vitamin D3, 125 mcg (5,000 unit) capsule Take 5,000 Units by mouth once daily.    coenzyme Q10 100 mg capsule 0    cyanocobalamin (VITAMIN B-12) 1000 MCG tablet Take 1,000 mcg by mouth every Mon, Wed, Fri.    estradioL (ESTRACE) 0.01 % (0.1 mg/gram) vaginal cream Place vaginally.    fenofibrate (TRICOR) 54 MG tablet Take 54 mg by mouth once daily.    flecainide (TAMBOCOR) 50 MG Tab Take 50 mg by mouth 2 (two) times daily.    glimepiride (AMARYL) 2 MG tablet Take 4 mg by mouth  daily with breakfast. 2mg at bedtime    levothyroxine (SYNTHROID) 137 MCG Tab tablet Take 125 mcg by mouth before breakfast.    metoprolol succinate (TOPROL-XL) 25 MG 24 hr tablet Take 25 mg by mouth 2 (two) times daily.    niacin 500 MG CpSR Take 1,000 mg by mouth every evening.    omega-3 acid ethyl esters (LOVAZA) 1 gram capsule Take 2 capsules by mouth 2 (two) times daily.    ONETOUCH DELICA PLUS LANCET 33 gauge Misc Apply topically.    ONETOUCH VERIO TEST STRIPS Strp SMARTSIG:Via Meter    pantoprazole (PROTONIX) 20 MG tablet Take 20 mg by mouth once daily.    rosuvastatin (CRESTOR) 10 MG tablet Take 10 mg by mouth once daily.    sertraline (ZOLOFT) 100 MG tablet Take 100 mg by mouth once daily.    solifenacin (VESICARE) 5 MG tablet Take 5 mg by mouth every morning.    vitamin E 400 UNIT capsule Take 400 Units by mouth once daily.    zinc gluconate 50 mg tablet 1 tablet Orally Once a day for 30 day(s)    calcium carbonate (CALCIUM 500 ORAL) 0 (Patient not taking: Reported on 5/29/2025)    glucosamine coleman 2KCl-chondroit 750-600 mg Tab 0 (Patient not taking: Reported on 5/29/2025)    metoprolol tartrate (LOPRESSOR) 25 MG tablet  (Patient not taking: Reported on 5/29/2025)    nitrofurantoin, macrocrystal-monohydrate, (MACROBID) 100 MG capsule Take 100 mg by mouth 2 (two) times daily. (Patient not taking: Reported on 5/29/2025)    oxybutynin (DITROPAN-XL) 5 MG TR24 Take 5 mg by mouth once daily. (Patient not taking: Reported on 5/29/2025)    sucralfate (CARAFATE) 1 gram tablet 1 tablet on an empty stomach Orally Twice a day for 14 days (Patient not taking: Reported on 5/29/2025)    ubiquinol (ACTIVE Q ORAL) 0 (Patient not taking: Reported on 5/29/2025)    zinc sulfate (ZINC-15 ORAL) Take 50 mg by mouth once. (Patient not taking: Reported on 5/29/2025)     No current facility-administered medications for this visit.       Review of patient's allergies indicates:   Allergen Reactions    Adhesive tape-silicones         Family History   Problem Relation Name Age of Onset    Diabetes Mellitus Mother Angelina     Heart disease Mother Angelina     Hyperlipidemia Mother [place]     Cancer Mother Angelina     Diabetes Mother Levasy     Thyroid disease Mother Levasy     Osteoarthritis Mother Angelina     Diabetes Mellitus Father Oswaldo Anderson     Heart disease Father Oswaldo Anderson     Hypertension Father Oswaldo Anderson     Diabetes Father Oswaldo Anderson     Osteoarthritis Father Oswaldo Anderson     Arthritis Brother Kamlesh     Diabetes Brother Kamlesh     Arthritis Brother Daniel     Osteoarthritis Brother Daniel     Diabetes Brother Fran        Social History[1]        Review of Systems:    Constitution: Negative for chills, fever, and sweats.  Negative for unexplained weight loss.    HENT:  Negative for headaches and blurry vision.    Cardiovascular:Negative for chest pain or irregular heart beat. Negative for hypertension.    Respiratory:  Negative for cough and shortness of breath.    Gastrointestinal: Negative for abdominal pain, heartburn, melena, nausea, and vomitting.    Genitourinary:  Negative bladder incontinence and dysuria.    Musculoskeletal:  See HPI    Neurological: Negative for numbness.    Psychiatric/Behavioral: Negative for depression.  The patient is not nervous/anxious.      Endocrine: Negative for polyuria    Hematologic/Lymphatic: Negative for bleeding problem.  Does not bruise/bleed easily.    Skin: Negative for poor would healing and rash      Physical Examination:    Vital Signs:    Vitals:    05/29/25 1317   BP: 117/70   Pulse: (!) 59       Body mass index is 30.62 kg/m².    General: No acute distress, alert and oriented, healthy appearing    HEENT: Head is atraumatic, mucous membranes are moist    Neck: Supples, no JVD    Cardiovascular: Palpable dorsalis pedis and posterior tibial pulses, regular rate and rhythm to those pulses    Lungs: Breathing non-labored    Skin: no rashes appreciated    Neurologic: Can flex and extend knees, ankles,  and toes. Sensation is grossly intact    Left hip:  Range of motion left hip without significant pain or discomfort brisk cap refill disappeared sensation intact distally.  That has significant tenderness noted.    Left shoulder:  Range of motion of the left shoulder with some mild discomfort.  It has been tenderness over her biceps.  It had good strength to rotator cuff testing.    X-rays:  Three views left hip reviewed today.  Patient's implants appear well fixed with no signs of loosening or subsidence     Assessment::  Left, tendinitis versus early frozen shoulder, status post left total hip arthroplasty    Plan:  Patient's hip is doing well.  It had that has finally calmed down.  It had left shoulder it had having some issues following her fall on looks to be an early frozen shoulder.  We will get her into physical therapy for this.  She also has a multiple issues with the hand including trigger fingers as well as Dupuytren's contracture.  Plan to going to going to 1 of my partners about this.    This note was generated with the assistance of ambient listening technology. Verbal consent was obtained by the patient and accompanying visitor(s) for the recording of patient appointment to facilitate this note. I attest to having reviewed and edited the generated note for accuracy, though some syntax or spelling errors may persist. Please contact the author of this note for any clarification.      This note was created using [employer] voice recognition software that occasionally misinterpreted phrases or words.    Consult note is delivered via Epic messaging service.         [1]   Social History  Socioeconomic History    Marital status:    Tobacco Use    Smoking status: Former     Current packs/day: 0.50     Average packs/day: 0.5 packs/day for 30.0 years (15.0 ttl pk-yrs)     Types: Cigarettes    Smokeless tobacco: Never    Tobacco comments:     Quit 20 yrs+   Substance and Sexual Activity    Alcohol use:  Never    Drug use: Never    Sexual activity: Not Currently     Birth control/protection: Post-menopausal     Social Drivers of Health     Financial Resource Strain: Low Risk  (12/28/2022)    Overall Financial Resource Strain (CARDIA)     Difficulty of Paying Living Expenses: Not hard at all   Food Insecurity: Unknown (12/28/2022)    Hunger Vital Sign     Ran Out of Food in the Last Year: Never true   Transportation Needs: No Transportation Needs (12/28/2022)    PRAPARE - Transportation     Lack of Transportation (Medical): No     Lack of Transportation (Non-Medical): No   Housing Stability: Unknown (12/28/2022)    Housing Stability Vital Sign     Unable to Pay for Housing in the Last Year: No     Unstable Housing in the Last Year: No

## 2025-06-03 ENCOUNTER — HOSPITAL ENCOUNTER (OUTPATIENT)
Facility: HOSPITAL | Age: 70
Discharge: HOME | End: 2025-06-03
Attending: PATHOLOGY | Admitting: PATHOLOGY
Payer: MEDICARE

## 2025-06-03 VITALS
OXYGEN SATURATION: 98 % | HEART RATE: 57 BPM | SYSTOLIC BLOOD PRESSURE: 125 MMHG | TEMPERATURE: 98 F | DIASTOLIC BLOOD PRESSURE: 63 MMHG | RESPIRATION RATE: 19 BRPM

## 2025-06-03 DIAGNOSIS — D50.9 IRON DEFICIENCY ANEMIA, UNSPECIFIED IRON DEFICIENCY ANEMIA TYPE: ICD-10-CM

## 2025-06-03 DIAGNOSIS — D61.818 PANCYTOPENIA: ICD-10-CM

## 2025-06-03 LAB
BASOPHILS # BLD AUTO: 0.03 X10(3)/MCL
BASOPHILS NFR BLD AUTO: 0.7 %
EOSINOPHIL # BLD AUTO: 0.06 X10(3)/MCL (ref 0–0.9)
EOSINOPHIL NFR BLD AUTO: 1.5 %
ERYTHROCYTE [DISTWIDTH] IN BLOOD BY AUTOMATED COUNT: 12.9 % (ref 11.5–17)
HCT VFR BLD AUTO: 35.2 % (ref 37–47)
HGB BLD-MCNC: 11.1 G/DL (ref 12–16)
IMM GRANULOCYTES # BLD AUTO: 0.01 X10(3)/MCL (ref 0–0.04)
IMM GRANULOCYTES NFR BLD AUTO: 0.2 %
LYMPHOCYTES # BLD AUTO: 1.61 X10(3)/MCL (ref 0.6–4.6)
LYMPHOCYTES NFR BLD AUTO: 39.4 %
MCH RBC QN AUTO: 30.4 PG (ref 27–31)
MCHC RBC AUTO-ENTMCNC: 31.5 G/DL (ref 33–36)
MCV RBC AUTO: 96.4 FL (ref 80–94)
MONOCYTES # BLD AUTO: 0.54 X10(3)/MCL (ref 0.1–1.3)
MONOCYTES NFR BLD AUTO: 13.2 %
NEUTROPHILS # BLD AUTO: 1.84 X10(3)/MCL (ref 2.1–9.2)
NEUTROPHILS NFR BLD AUTO: 45 %
NRBC BLD AUTO-RTO: 0 %
PLATELET # BLD AUTO: 101 X10(3)/MCL (ref 130–400)
PMV BLD AUTO: 12.4 FL (ref 7.4–10.4)
POCT GLUCOSE: 146 MG/DL (ref 70–110)
POCT GLUCOSE: 161 MG/DL (ref 70–110)
RBC # BLD AUTO: 3.65 X10(6)/MCL (ref 4.2–5.4)
WBC # BLD AUTO: 4.09 X10(3)/MCL (ref 4.5–11.5)

## 2025-06-03 PROCEDURE — 36415 COLL VENOUS BLD VENIPUNCTURE: CPT | Performed by: INTERNAL MEDICINE

## 2025-06-03 PROCEDURE — 82962 GLUCOSE BLOOD TEST: CPT | Performed by: PATHOLOGY

## 2025-06-03 PROCEDURE — 38222 DX BONE MARROW BX & ASPIR: CPT | Performed by: PATHOLOGY

## 2025-06-03 PROCEDURE — 85025 COMPLETE CBC W/AUTO DIFF WBC: CPT | Performed by: INTERNAL MEDICINE

## 2025-06-03 PROCEDURE — 63600175 PHARM REV CODE 636 W HCPCS: Performed by: PATHOLOGY

## 2025-06-03 RX ORDER — SODIUM CHLORIDE 9 MG/ML
INJECTION, SOLUTION INTRAVENOUS CONTINUOUS
Status: DISCONTINUED | OUTPATIENT
Start: 2025-06-03 | End: 2025-06-03 | Stop reason: HOSPADM

## 2025-06-03 RX ORDER — FLUMAZENIL 0.1 MG/ML
0.2 INJECTION INTRAVENOUS
Status: DISCONTINUED | OUTPATIENT
Start: 2025-06-03 | End: 2025-06-03 | Stop reason: HOSPADM

## 2025-06-03 RX ORDER — MIDAZOLAM HYDROCHLORIDE 2 MG/2ML
1 INJECTION, SOLUTION INTRAMUSCULAR; INTRAVENOUS
Status: DISCONTINUED | OUTPATIENT
Start: 2025-06-03 | End: 2025-06-03 | Stop reason: HOSPADM

## 2025-06-03 RX ADMIN — MIDAZOLAM HYDROCHLORIDE 2 MG: 1 INJECTION, SOLUTION INTRAMUSCULAR; INTRAVENOUS at 10:06

## 2025-06-03 RX ADMIN — MIDAZOLAM HYDROCHLORIDE 3 MG: 1 INJECTION, SOLUTION INTRAMUSCULAR; INTRAVENOUS at 10:06

## 2025-06-04 ENCOUNTER — INFUSION (OUTPATIENT)
Dept: INFUSION THERAPY | Facility: HOSPITAL | Age: 70
End: 2025-06-04
Attending: INTERNAL MEDICINE
Payer: MEDICARE

## 2025-06-04 VITALS
TEMPERATURE: 99 F | OXYGEN SATURATION: 96 % | DIASTOLIC BLOOD PRESSURE: 68 MMHG | HEIGHT: 60 IN | RESPIRATION RATE: 16 BRPM | WEIGHT: 163.31 LBS | SYSTOLIC BLOOD PRESSURE: 118 MMHG | HEART RATE: 62 BPM | BODY MASS INDEX: 32.06 KG/M2

## 2025-06-04 DIAGNOSIS — E61.1 IRON DEFICIENCY: Primary | ICD-10-CM

## 2025-06-04 PROCEDURE — 96374 THER/PROPH/DIAG INJ IV PUSH: CPT

## 2025-06-04 PROCEDURE — 96375 TX/PRO/DX INJ NEW DRUG ADDON: CPT

## 2025-06-04 PROCEDURE — 63600175 PHARM REV CODE 636 W HCPCS: Mod: JZ,TB | Performed by: INTERNAL MEDICINE

## 2025-06-04 RX ORDER — EPINEPHRINE 0.3 MG/.3ML
0.3 INJECTION SUBCUTANEOUS ONCE AS NEEDED
Status: DISCONTINUED | OUTPATIENT
Start: 2025-06-04 | End: 2025-06-04 | Stop reason: HOSPADM

## 2025-06-04 RX ORDER — HEPARIN 100 UNIT/ML
500 SYRINGE INTRAVENOUS
OUTPATIENT
Start: 2025-06-11

## 2025-06-04 RX ORDER — FAMOTIDINE 10 MG/ML
20 INJECTION, SOLUTION INTRAVENOUS
OUTPATIENT
Start: 2025-06-11 | End: 2025-06-11

## 2025-06-04 RX ORDER — FAMOTIDINE 10 MG/ML
20 INJECTION, SOLUTION INTRAVENOUS
Status: COMPLETED | OUTPATIENT
Start: 2025-06-04 | End: 2025-06-04

## 2025-06-04 RX ORDER — SODIUM CHLORIDE 0.9 % (FLUSH) 0.9 %
10 SYRINGE (ML) INJECTION
OUTPATIENT
Start: 2025-06-11

## 2025-06-04 RX ORDER — HEPARIN 100 UNIT/ML
500 SYRINGE INTRAVENOUS
Status: DISCONTINUED | OUTPATIENT
Start: 2025-06-04 | End: 2025-06-04 | Stop reason: HOSPADM

## 2025-06-04 RX ORDER — SODIUM CHLORIDE 0.9 % (FLUSH) 0.9 %
10 SYRINGE (ML) INJECTION
Status: DISCONTINUED | OUTPATIENT
Start: 2025-06-04 | End: 2025-06-04 | Stop reason: HOSPADM

## 2025-06-04 RX ORDER — EPINEPHRINE 0.3 MG/.3ML
0.3 INJECTION SUBCUTANEOUS ONCE AS NEEDED
OUTPATIENT
Start: 2025-06-11

## 2025-06-04 RX ADMIN — FERRIC CARBOXYMALTOSE INJECTION 750 MG: 50 INJECTION, SOLUTION INTRAVENOUS at 02:06

## 2025-06-04 RX ADMIN — FAMOTIDINE 20 MG: 10 INJECTION, SOLUTION INTRAVENOUS at 02:06

## 2025-06-04 RX ADMIN — METHYLPREDNISOLONE SODIUM SUCCINATE 40 MG: 40 INJECTION, POWDER, FOR SOLUTION INTRAMUSCULAR; INTRAVENOUS at 02:06

## 2025-06-11 ENCOUNTER — INFUSION (OUTPATIENT)
Dept: INFUSION THERAPY | Facility: HOSPITAL | Age: 70
End: 2025-06-11
Attending: INTERNAL MEDICINE
Payer: MEDICARE

## 2025-06-11 VITALS
DIASTOLIC BLOOD PRESSURE: 69 MMHG | TEMPERATURE: 98 F | OXYGEN SATURATION: 96 % | HEART RATE: 64 BPM | RESPIRATION RATE: 18 BRPM | SYSTOLIC BLOOD PRESSURE: 114 MMHG

## 2025-06-11 DIAGNOSIS — E61.1 IRON DEFICIENCY: Primary | ICD-10-CM

## 2025-06-11 PROCEDURE — 63600175 PHARM REV CODE 636 W HCPCS: Performed by: INTERNAL MEDICINE

## 2025-06-11 PROCEDURE — 25000003 PHARM REV CODE 250: Performed by: INTERNAL MEDICINE

## 2025-06-11 PROCEDURE — 96375 TX/PRO/DX INJ NEW DRUG ADDON: CPT

## 2025-06-11 PROCEDURE — 96374 THER/PROPH/DIAG INJ IV PUSH: CPT

## 2025-06-11 RX ORDER — SODIUM CHLORIDE 0.9 % (FLUSH) 0.9 %
10 SYRINGE (ML) INJECTION
Status: CANCELLED | OUTPATIENT
Start: 2025-06-11

## 2025-06-11 RX ORDER — EPINEPHRINE 0.3 MG/.3ML
0.3 INJECTION SUBCUTANEOUS ONCE AS NEEDED
OUTPATIENT
Start: 2025-06-11

## 2025-06-11 RX ORDER — SODIUM CHLORIDE 0.9 % (FLUSH) 0.9 %
10 SYRINGE (ML) INJECTION
Status: DISCONTINUED | OUTPATIENT
Start: 2025-06-11 | End: 2025-06-11 | Stop reason: HOSPADM

## 2025-06-11 RX ORDER — HEPARIN 100 UNIT/ML
500 SYRINGE INTRAVENOUS
Status: CANCELLED | OUTPATIENT
Start: 2025-06-11

## 2025-06-11 RX ORDER — FAMOTIDINE 10 MG/ML
20 INJECTION, SOLUTION INTRAVENOUS
Status: CANCELLED | OUTPATIENT
Start: 2025-06-11 | End: 2025-06-11

## 2025-06-11 RX ORDER — FAMOTIDINE 10 MG/ML
20 INJECTION, SOLUTION INTRAVENOUS
Status: COMPLETED | OUTPATIENT
Start: 2025-06-11 | End: 2025-06-11

## 2025-06-11 RX ORDER — HEPARIN 100 UNIT/ML
500 SYRINGE INTRAVENOUS
Status: DISCONTINUED | OUTPATIENT
Start: 2025-06-11 | End: 2025-06-11 | Stop reason: HOSPADM

## 2025-06-11 RX ORDER — EPINEPHRINE 0.3 MG/.3ML
0.3 INJECTION SUBCUTANEOUS ONCE AS NEEDED
Status: DISCONTINUED | OUTPATIENT
Start: 2025-06-11 | End: 2025-06-11 | Stop reason: HOSPADM

## 2025-06-11 RX ADMIN — FERRIC CARBOXYMALTOSE INJECTION 750 MG: 50 INJECTION, SOLUTION INTRAVENOUS at 02:06

## 2025-06-11 RX ADMIN — FAMOTIDINE 20 MG: 10 INJECTION, SOLUTION INTRAVENOUS at 02:06

## 2025-06-11 RX ADMIN — SODIUM CHLORIDE: 9 INJECTION, SOLUTION INTRAVENOUS at 02:06

## 2025-06-11 RX ADMIN — METHYLPREDNISOLONE SODIUM SUCCINATE 40 MG: 40 INJECTION, POWDER, FOR SOLUTION INTRAMUSCULAR; INTRAVENOUS at 02:06

## 2025-06-11 NOTE — PLAN OF CARE
Injectafer #2/2 given; tolerated well; next appointments confirmed with patient; discharged home in stable condition.

## 2025-06-12 ENCOUNTER — HOSPITAL ENCOUNTER (OUTPATIENT)
Dept: RADIOLOGY | Facility: CLINIC | Age: 70
Discharge: HOME | End: 2025-06-12
Attending: ORTHOPAEDIC SURGERY
Payer: MEDICARE

## 2025-06-12 ENCOUNTER — OFFICE VISIT (OUTPATIENT)
Dept: ORTHOPEDICS | Facility: CLINIC | Age: 70
End: 2025-06-12
Payer: MEDICARE

## 2025-06-12 VITALS
SYSTOLIC BLOOD PRESSURE: 107 MMHG | BODY MASS INDEX: 32.08 KG/M2 | DIASTOLIC BLOOD PRESSURE: 69 MMHG | HEIGHT: 60 IN | WEIGHT: 163.38 LBS | HEART RATE: 62 BPM

## 2025-06-12 DIAGNOSIS — M79.641 BILATERAL HAND PAIN: Primary | ICD-10-CM

## 2025-06-12 DIAGNOSIS — M65.332 TRIGGER FINGER, LEFT MIDDLE FINGER: ICD-10-CM

## 2025-06-12 DIAGNOSIS — M65.311 TRIGGER THUMB OF RIGHT HAND: ICD-10-CM

## 2025-06-12 DIAGNOSIS — M79.642 BILATERAL HAND PAIN: ICD-10-CM

## 2025-06-12 DIAGNOSIS — M72.0 DUPUYTREN DISEASE OF PALM OF LEFT HAND: ICD-10-CM

## 2025-06-12 DIAGNOSIS — M79.641 BILATERAL HAND PAIN: ICD-10-CM

## 2025-06-12 DIAGNOSIS — M79.642 BILATERAL HAND PAIN: Primary | ICD-10-CM

## 2025-06-12 PROCEDURE — 3008F BODY MASS INDEX DOCD: CPT | Mod: CPTII,,, | Performed by: ORTHOPAEDIC SURGERY

## 2025-06-12 PROCEDURE — 1160F RVW MEDS BY RX/DR IN RCRD: CPT | Mod: CPTII,,, | Performed by: ORTHOPAEDIC SURGERY

## 2025-06-12 PROCEDURE — 3078F DIAST BP <80 MM HG: CPT | Mod: CPTII,,, | Performed by: ORTHOPAEDIC SURGERY

## 2025-06-12 PROCEDURE — 1125F AMNT PAIN NOTED PAIN PRSNT: CPT | Mod: CPTII,,, | Performed by: ORTHOPAEDIC SURGERY

## 2025-06-12 PROCEDURE — 99214 OFFICE O/P EST MOD 30 MIN: CPT | Mod: 25,,, | Performed by: ORTHOPAEDIC SURGERY

## 2025-06-12 PROCEDURE — 3288F FALL RISK ASSESSMENT DOCD: CPT | Mod: CPTII,,, | Performed by: ORTHOPAEDIC SURGERY

## 2025-06-12 PROCEDURE — 1101F PT FALLS ASSESS-DOCD LE1/YR: CPT | Mod: CPTII,,, | Performed by: ORTHOPAEDIC SURGERY

## 2025-06-12 PROCEDURE — 20550 NJX 1 TENDON SHEATH/LIGAMENT: CPT | Mod: LT,,, | Performed by: ORTHOPAEDIC SURGERY

## 2025-06-12 PROCEDURE — 3074F SYST BP LT 130 MM HG: CPT | Mod: CPTII,,, | Performed by: ORTHOPAEDIC SURGERY

## 2025-06-12 PROCEDURE — 1159F MED LIST DOCD IN RCRD: CPT | Mod: CPTII,,, | Performed by: ORTHOPAEDIC SURGERY

## 2025-06-12 RX ADMIN — LIDOCAINE HYDROCHLORIDE 2 ML: 20 INJECTION, SOLUTION INFILTRATION; PERINEURAL at 02:06

## 2025-06-12 RX ADMIN — BETAMETHASONE SODIUM PHOSPHATE AND BETAMETHASONE ACETATE 6 MG: 3; 3 INJECTION, SUSPENSION INTRA-ARTICULAR; INTRALESIONAL; INTRAMUSCULAR; SOFT TISSUE at 02:06

## 2025-06-12 NOTE — PROCEDURES
Tendon Sheath    Date/Time: 6/12/2025 2:45 PM    Performed by: Ra Sanders MD  Authorized by: Ra Sanders MD    Consent Done?:  Yes (Verbal)  Indications:  Pain  Site marked: the procedure site was marked    Timeout: prior to procedure the correct patient, procedure, and site was verified    Prep: patient was prepped and draped in usual sterile fashion      Approach:  Volar  Medications:  2 mL LIDOcaine HCL 20 mg/ml (2%) 20 mg/mL (2 %); 6 mg betamethasone acetate-betamethasone sodium phosphate 6 mg/mL  Patient tolerance:  Patient tolerated the procedure well with no immediate complications

## 2025-06-19 RX ORDER — BETAMETHASONE SODIUM PHOSPHATE AND BETAMETHASONE ACETATE 3; 3 MG/ML; MG/ML
6 INJECTION, SUSPENSION INTRA-ARTICULAR; INTRALESIONAL; INTRAMUSCULAR; SOFT TISSUE
Status: DISCONTINUED | OUTPATIENT
Start: 2025-06-12 | End: 2025-06-19 | Stop reason: HOSPADM

## 2025-06-19 RX ORDER — LIDOCAINE HYDROCHLORIDE 20 MG/ML
2 INJECTION, SOLUTION INFILTRATION; PERINEURAL
Status: DISCONTINUED | OUTPATIENT
Start: 2025-06-12 | End: 2025-06-19 | Stop reason: HOSPADM

## 2025-06-25 NOTE — PROGRESS NOTES
Subjective:       Patient ID: Lachelle Alanis is a 69 y.o. female.    Chief Complaint: Follow-up (Pt has no concerns today)      Diagnosis:  Pancytopenia  Iron-deficiency    Current Treatment:   As needed iron infusions, most recently completed IV iron with Injectafer on 06/11/2025.    Treatment History:  Iron infusions PRN    HPI:  69-year-old female with a history of iron-deficiency, previously followed with Hematology.  Her hematologist retired, she did not establish care with a new hematologist.  She has undergone extensive GI workup in the past, found to have multiple colon polyps but no active bleeding or cause for iron-deficiency anemia.    She was seen by her primary care physician, had blood work done on 04/24/2025, revealed a white blood cell count of 3.6, hemoglobin 11.8, hematocrit of 36.7, MCV of 96.1 and a platelet count of a 109,000. She was referred to Hematology.  I originally saw her on 05/20/2025.  She stated that she had back pain, unexplained weight change, decreased appetite.  She denied any blood in her stool.    Bone marrow biopsy on 06/03/2025 was normal with no MDS, hematologic malignancy, normal FISH, normal cytogenetics, normal NGS.    Interval History:   Patient presents to clinic for scheduled follow up appointment. Overall doing well. No major issues to discuss at this time.       Past Medical History:   Diagnosis Date    Acne     Anemia     Anxiety     Arm pain, right     Cervical spondylosis with radiculopathy     Depression     Diabetes     Dupuytren's contracture     GERD (gastroesophageal reflux disease)     Hard of hearing     Headache     Heart palpitations     Hyperlipidemia     Hypertension     Irregular heart beat     Osteoporosis     Postoperative hypothyroidism     Shoulder pain     Sleep apnea     does not use CPAP    Unspecified osteoarthritis, unspecified site       Past Surgical History:   Procedure Laterality Date    ABDOMINAL SURGERY      tummy tuck    ADENOIDECTOMY       Dont remember    AUGMENTATION OF BREAST      BLADDER SUSPENSION      BONE MARROW BIOPSY N/A 6/3/2025    Procedure: Biopsy-bone marrow;  Surgeon: Kalpesh Arreola MD;  Location: [facility];  Service: General;  Laterality: N/A;    BUNIONECTOMY Right     CARPAL TUNNEL RELEASE Bilateral     COLONOSCOPY      COSMETIC SURGERY      DECOMPRESSION OF ULNAR NERVE      ESOPHAGOGASTRODUODENOSCOPY      HIP REPLACEMENT ARTHROPLASTY Left     HIP SURGERY Right     HYSTERECTOMY      vaginal; partial    ROBOTIC ARTHROPLASTY, HIP Left 07/08/2024    Procedure: ROBOTIC ARTHROPLASTY,HIP;  Surgeon: Alexey Wayne MD;  Location: [facility];  Service: Orthopedics;  Laterality: Left;    SPINE SURGERY      THYROIDECTOMY      due to goiter, Hoshimoto's disease    TONSILLECTOMY      TOTAL REPLACEMENT OF CERVICAL INTERVERTEBRAL DISC N/A 12/27/2022    Procedure: REPLACEMENT, INTERVERTEBRAL DISC, CERVICAL, TOTAL;  Surgeon: Daniel Melissa MD;  Location: [facility];  Service: Neurosurgery;  Laterality: N/A;  C5-6, C6-7 TOTAL DISC REPLACEMENT, ACDF IF NECESSARY  NTI //  JAY    TRIGGER FINGER RELEASE      left 3rd finger     Social History     Socioeconomic History    Marital status:    Tobacco Use    Smoking status: Former     Current packs/day: 0.50     Average packs/day: 0.5 packs/day for 30.0 years (15.0 ttl pk-yrs)     Types: Cigarettes    Smokeless tobacco: Never    Tobacco comments:     Quit 20 yrs+   Substance and Sexual Activity    Alcohol use: Never    Drug use: Never    Sexual activity: Not Currently     Birth control/protection: Post-menopausal     Social Drivers of Health     Financial Resource Strain: Low Risk  (12/28/2022)    Overall Financial Resource Strain (CARDIA)     Difficulty of Paying Living Expenses: Not hard at all   Food Insecurity: Unknown (12/28/2022)    Hunger Vital Sign     Ran Out of Food in the Last Year: Never true   Transportation Needs: No Transportation Needs (12/28/2022)    PRAPARE - Transportation     Lack  of Transportation (Medical): No     Lack of Transportation (Non-Medical): No   Housing Stability: Unknown (12/28/2022)    Housing Stability Vital Sign     Unable to Pay for Housing in the Last Year: No     Unstable Housing in the Last Year: No      Family History   Problem Relation Name Age of Onset    Diabetes Mellitus Mother [place]     Heart disease Mother Angelina     Hyperlipidemia Mother Angelina     Cancer Mother [place]     Diabetes Mother Angelina     Thyroid disease Mother [place]     Osteoarthritis Mother [place]     Diabetes Mellitus Father Oswaldo Anderson     Heart disease Father Oswlado Anderson     Hypertension Father Oswaldo Anderson     Diabetes Father Oswaldo Anderson     Osteoarthritis Father Oswaldo Anderson     Arthritis Brother Kamlesh     Diabetes Brother Kamlesh     Arthritis Brother Daniel     Osteoarthritis Brother Daniel     Diabetes Brother Fran       Review of patient's allergies indicates:   Allergen Reactions    Adhesive tape-silicones       Review of Systems   Constitutional:  Positive for unexpected weight change. Negative for appetite change.   HENT:  Negative for mouth sores.    Eyes:  Positive for visual disturbance.   Respiratory:  Negative for cough and shortness of breath.    Cardiovascular:  Negative for chest pain.   Gastrointestinal:  Negative for abdominal pain and diarrhea.   Genitourinary:  Negative for frequency.   Musculoskeletal:  Positive for back pain.   Integumentary:  Negative for rash.   Neurological:  Negative for headaches.   Hematological:  Negative for adenopathy.   Psychiatric/Behavioral:  The patient is not nervous/anxious.          Objective:      Physical Exam  Vitals reviewed. Exam conducted with a chaperone present.   Constitutional:       General: She is not in acute distress.     Appearance: Normal appearance.   HENT:      Head: Normocephalic and atraumatic.      Nose: Nose normal.      Mouth/Throat:      Mouth: Mucous membranes are moist.   Eyes:      Extraocular Movements: Extraocular movements  intact.      Conjunctiva/sclera: Conjunctivae normal.   Cardiovascular:      Rate and Rhythm: Normal rate and regular rhythm.      Pulses: Normal pulses.      Heart sounds: Normal heart sounds.   Pulmonary:      Effort: Pulmonary effort is normal.      Breath sounds: Normal breath sounds.   Abdominal:      General: Bowel sounds are normal.      Palpations: Abdomen is soft.   Musculoskeletal:         General: No swelling. Normal range of motion.      Cervical back: Normal range of motion and neck supple.      Right lower leg: No edema.      Left lower leg: No edema.   Lymphadenopathy:      Cervical: No cervical adenopathy.   Skin:     General: Skin is warm and dry.   Neurological:      General: No focal deficit present.      Mental Status: She is alert and oriented to person, place, and time. Mental status is at baseline.   Psychiatric:         Mood and Affect: Mood normal.         Behavior: Behavior normal.         LABS AND IMAGING REVIEWED IN EPIC          Assessment:   As needed iron infusions  Further treatment pending bone marrow biopsy        Plan:       Had a long discussion with the patient, I explained that I do not think she had anything acute due to the fact that this has been going on for years. I explained that a bone marrow biopsy would rule out any bone marrow pathology such as MDS.  Patient voiced understanding.    Copper level normal.     Patient reports up-to-date with screening colonoscopy      Bone marrow biopsy on 06/03/2025 was within normal limits.    Completed 2/2 IV iron with Injectafer on 06/11/2025.    I will refer her back to Dr. Marielle Leigh     Repeat iron studies today    Return to clinic in 3 months with labs    David Kline II, MD I, Diane Franco LPN, acted solely as a scribe for and in the presence of, Dr. David Kline who performed the service.

## 2025-07-01 ENCOUNTER — OFFICE VISIT (OUTPATIENT)
Dept: HEMATOLOGY/ONCOLOGY | Facility: CLINIC | Age: 70
End: 2025-07-01
Payer: MEDICARE

## 2025-07-01 VITALS
OXYGEN SATURATION: 99 % | DIASTOLIC BLOOD PRESSURE: 69 MMHG | SYSTOLIC BLOOD PRESSURE: 115 MMHG | HEART RATE: 61 BPM | RESPIRATION RATE: 18 BRPM | WEIGHT: 154.31 LBS | HEIGHT: 61 IN | BODY MASS INDEX: 29.13 KG/M2

## 2025-07-01 DIAGNOSIS — D50.9 IRON DEFICIENCY ANEMIA, UNSPECIFIED IRON DEFICIENCY ANEMIA TYPE: Primary | ICD-10-CM

## 2025-07-01 LAB
ALBUMIN SERPL-MCNC: 3.9 G/DL (ref 3.4–4.8)
ALBUMIN/GLOB SERPL: 1.2 RATIO (ref 1.1–2)
ALP SERPL-CCNC: 47 UNIT/L (ref 40–150)
ALT SERPL-CCNC: 12 UNIT/L (ref 0–55)
ANION GAP SERPL CALC-SCNC: 4 MEQ/L
AST SERPL-CCNC: 19 UNIT/L (ref 11–45)
BASOPHILS # BLD AUTO: 0.01 X10(3)/MCL
BASOPHILS NFR BLD AUTO: 0.3 %
BILIRUB SERPL-MCNC: 0.7 MG/DL
BUN SERPL-MCNC: 20.7 MG/DL (ref 9.8–20.1)
CALCIUM SERPL-MCNC: 8.9 MG/DL (ref 8.4–10.2)
CHLORIDE SERPL-SCNC: 108 MMOL/L (ref 98–107)
CO2 SERPL-SCNC: 27 MMOL/L (ref 23–31)
CREAT SERPL-MCNC: 1.01 MG/DL (ref 0.55–1.02)
CREAT/UREA NIT SERPL: 20
EOSINOPHIL # BLD AUTO: 0.1 X10(3)/MCL (ref 0–0.9)
EOSINOPHIL NFR BLD AUTO: 2.5 %
ERYTHROCYTE [DISTWIDTH] IN BLOOD BY AUTOMATED COUNT: 13.9 % (ref 11.5–17)
FERRITIN SERPL-MCNC: 1525.52 NG/ML (ref 4.63–204)
GFR SERPLBLD CREATININE-BSD FMLA CKD-EPI: >60 ML/MIN/1.73/M2
GLOBULIN SER-MCNC: 3.3 GM/DL (ref 2.4–3.5)
GLUCOSE SERPL-MCNC: 232 MG/DL (ref 82–115)
HCT VFR BLD AUTO: 37.2 % (ref 37–47)
HGB BLD-MCNC: 12.6 G/DL (ref 12–16)
IMM GRANULOCYTES # BLD AUTO: 0 X10(3)/MCL (ref 0–0.04)
IMM GRANULOCYTES NFR BLD AUTO: 0 %
IRON SATN MFR SERPL: 55 % (ref 20–50)
IRON SERPL-MCNC: 159 UG/DL (ref 50–170)
LYMPHOCYTES # BLD AUTO: 0.94 X10(3)/MCL (ref 0.6–4.6)
LYMPHOCYTES NFR BLD AUTO: 23.5 %
MCH RBC QN AUTO: 32.4 PG (ref 27–31)
MCHC RBC AUTO-ENTMCNC: 33.9 G/DL (ref 33–36)
MCV RBC AUTO: 95.6 FL (ref 80–94)
MONOCYTES # BLD AUTO: 0.44 X10(3)/MCL (ref 0.1–1.3)
MONOCYTES NFR BLD AUTO: 11 %
NEUTROPHILS # BLD AUTO: 2.51 X10(3)/MCL (ref 2.1–9.2)
NEUTROPHILS NFR BLD AUTO: 62.7 %
PLATELET # BLD AUTO: 93 X10(3)/MCL (ref 130–400)
PMV BLD AUTO: 12 FL (ref 7.4–10.4)
POTASSIUM SERPL-SCNC: 4.4 MMOL/L (ref 3.5–5.1)
PROT SERPL-MCNC: 7.2 GM/DL (ref 5.8–7.6)
RBC # BLD AUTO: 3.89 X10(6)/MCL (ref 4.2–5.4)
SODIUM SERPL-SCNC: 139 MMOL/L (ref 136–145)
TIBC SERPL-MCNC: 128 UG/DL (ref 70–310)
TIBC SERPL-MCNC: 287 UG/DL (ref 250–450)
TRANSFERRIN SERPL-MCNC: 244 MG/DL (ref 173–360)
WBC # BLD AUTO: 4 X10(3)/MCL (ref 4.5–11.5)

## 2025-07-01 PROCEDURE — 36415 COLL VENOUS BLD VENIPUNCTURE: CPT | Performed by: INTERNAL MEDICINE

## 2025-07-01 PROCEDURE — 80053 COMPREHEN METABOLIC PANEL: CPT | Performed by: INTERNAL MEDICINE

## 2025-07-01 PROCEDURE — 1159F MED LIST DOCD IN RCRD: CPT | Mod: CPTII,S$GLB,, | Performed by: INTERNAL MEDICINE

## 2025-07-01 PROCEDURE — 1160F RVW MEDS BY RX/DR IN RCRD: CPT | Mod: CPTII,S$GLB,, | Performed by: INTERNAL MEDICINE

## 2025-07-01 PROCEDURE — 3078F DIAST BP <80 MM HG: CPT | Mod: CPTII,S$GLB,, | Performed by: INTERNAL MEDICINE

## 2025-07-01 PROCEDURE — 3008F BODY MASS INDEX DOCD: CPT | Mod: CPTII,S$GLB,, | Performed by: INTERNAL MEDICINE

## 2025-07-01 PROCEDURE — 85025 COMPLETE CBC W/AUTO DIFF WBC: CPT | Performed by: INTERNAL MEDICINE

## 2025-07-01 PROCEDURE — 3074F SYST BP LT 130 MM HG: CPT | Mod: CPTII,S$GLB,, | Performed by: INTERNAL MEDICINE

## 2025-07-01 PROCEDURE — 99999 PR PBB SHADOW E&M-EST. PATIENT-LVL III: CPT | Mod: PBBFAC,,, | Performed by: INTERNAL MEDICINE

## 2025-07-01 PROCEDURE — 99214 OFFICE O/P EST MOD 30 MIN: CPT | Mod: S$GLB,,, | Performed by: INTERNAL MEDICINE

## 2025-07-01 PROCEDURE — 1101F PT FALLS ASSESS-DOCD LE1/YR: CPT | Mod: CPTII,S$GLB,, | Performed by: INTERNAL MEDICINE

## 2025-07-01 PROCEDURE — 82728 ASSAY OF FERRITIN: CPT | Performed by: INTERNAL MEDICINE

## 2025-07-01 PROCEDURE — 3288F FALL RISK ASSESSMENT DOCD: CPT | Mod: CPTII,S$GLB,, | Performed by: INTERNAL MEDICINE

## 2025-07-01 PROCEDURE — 83550 IRON BINDING TEST: CPT | Performed by: INTERNAL MEDICINE

## 2025-07-01 RX ORDER — LEVOTHYROXINE SODIUM 125 UG/1
TABLET ORAL
COMMUNITY
Start: 2025-06-03

## 2025-08-05 ENCOUNTER — HOSPITAL ENCOUNTER (OUTPATIENT)
Dept: RADIOLOGY | Facility: CLINIC | Age: 70
Discharge: HOME | End: 2025-08-05
Attending: ORTHOPAEDIC SURGERY
Payer: MEDICARE

## 2025-08-05 ENCOUNTER — OFFICE VISIT (OUTPATIENT)
Dept: ORTHOPEDICS | Facility: CLINIC | Age: 70
End: 2025-08-05
Payer: MEDICARE

## 2025-08-05 VITALS — SYSTOLIC BLOOD PRESSURE: 100 MMHG | HEART RATE: 62 BPM | DIASTOLIC BLOOD PRESSURE: 63 MMHG

## 2025-08-05 DIAGNOSIS — Z96.642 AFTERCARE FOLLOWING LEFT HIP JOINT REPLACEMENT SURGERY: ICD-10-CM

## 2025-08-05 DIAGNOSIS — Z47.1 AFTERCARE FOLLOWING LEFT HIP JOINT REPLACEMENT SURGERY: ICD-10-CM

## 2025-08-05 DIAGNOSIS — Z96.642 AFTERCARE FOLLOWING LEFT HIP JOINT REPLACEMENT SURGERY: Primary | ICD-10-CM

## 2025-08-05 DIAGNOSIS — Z47.1 AFTERCARE FOLLOWING LEFT HIP JOINT REPLACEMENT SURGERY: Primary | ICD-10-CM

## 2025-08-05 PROCEDURE — 1101F PT FALLS ASSESS-DOCD LE1/YR: CPT | Mod: CPTII,,, | Performed by: ORTHOPAEDIC SURGERY

## 2025-08-05 PROCEDURE — 3078F DIAST BP <80 MM HG: CPT | Mod: CPTII,,, | Performed by: ORTHOPAEDIC SURGERY

## 2025-08-05 PROCEDURE — 99214 OFFICE O/P EST MOD 30 MIN: CPT | Mod: ,,, | Performed by: ORTHOPAEDIC SURGERY

## 2025-08-05 PROCEDURE — 73502 X-RAY EXAM HIP UNI 2-3 VIEWS: CPT | Mod: LT,,, | Performed by: ORTHOPAEDIC SURGERY

## 2025-08-05 PROCEDURE — 3288F FALL RISK ASSESSMENT DOCD: CPT | Mod: CPTII,,, | Performed by: ORTHOPAEDIC SURGERY

## 2025-08-05 PROCEDURE — 1159F MED LIST DOCD IN RCRD: CPT | Mod: CPTII,,, | Performed by: ORTHOPAEDIC SURGERY

## 2025-08-05 PROCEDURE — 3074F SYST BP LT 130 MM HG: CPT | Mod: CPTII,,, | Performed by: ORTHOPAEDIC SURGERY

## 2025-08-05 RX ORDER — MAGNESIUM GLUCONATE 27.5 (500)
TABLET ORAL ONCE
COMMUNITY

## 2025-08-06 NOTE — PROGRESS NOTES
Chief Complaint:   Chief Complaint   Patient presents with    Left Hip - Pain     Lt KULWANT sx 7/8/24. Pt report hip is doing really well. Still has some limited Rom, sometimes will get a pain if she steps into her car with Lt leg first.        History of present illness:  History of Present Illness    CHIEF COMPLAINT:  - Left hip arthroplasty follow-up.    HPI:  Lachelle presents for follow-up regarding her hip. Her hip is generally good but still experiences occasional discomfort. Pain somewhat limits her activities but is not worsening, maintaining a status quo. She experiences tightness at times. Her hips are not even, and she has some curvature in her back. Her left leg feels longer, though the exact length difference was not specified. She attempted using a shoe insert for the right side but found it aggravating and unhelpful.    She also mentions ongoing shoulder issues. She did not attend PT for the shoulder due to budget constraints, as it would cost $90 per week.    She denies finding shoe inserts helpful for perceived leg length discrepancy.    IMAGING:  - XR Hips: The leg lengths appear close to even. Lachelle's hip socket was noted to be small.    SURGICAL HISTORY:  - Hip replacement      ROS:  Musculoskeletal: +limb pain, +pain with movement          Past Medical History:   Diagnosis Date    Acne     Anemia     Anxiety     Arm pain, right     Cervical spondylosis with radiculopathy     Depression     Diabetes     Dupuytren's contracture     GERD (gastroesophageal reflux disease)     Hard of hearing     Headache     Heart palpitations     Hyperlipidemia     Hypertension     Irregular heart beat     Osteoporosis     Postoperative hypothyroidism     Shoulder pain     Sleep apnea     does not use CPAP    Unspecified osteoarthritis, unspecified site        Past Surgical History:   Procedure Laterality Date    ABDOMINAL SURGERY      tummy tuck    ADENOIDECTOMY      Dont remember    AUGMENTATION OF BREAST       BLADDER SUSPENSION      BONE MARROW BIOPSY N/A 06/03/2025    Procedure: Biopsy-bone marrow;  Surgeon: Kalpesh Arreola MD;  Location: [facility] OR;  Service: General;  Laterality: N/A;    BUNIONECTOMY Right     CARPAL TUNNEL RELEASE Bilateral     COLONOSCOPY      COSMETIC SURGERY      DECOMPRESSION OF ULNAR NERVE      ESOPHAGOGASTRODUODENOSCOPY  07/30/2025    HIP REPLACEMENT ARTHROPLASTY Left     HIP SURGERY Right     HYSTERECTOMY      vaginal; partial    ROBOTIC ARTHROPLASTY, HIP Left 07/08/2024    Procedure: ROBOTIC ARTHROPLASTY,HIP;  Surgeon: Alexey Wayne MD;  Location: [facility] OR;  Service: Orthopedics;  Laterality: Left;    SPINE SURGERY      THYROIDECTOMY      due to goiter, Hoshimoto's disease    TONSILLECTOMY      TOTAL REPLACEMENT OF CERVICAL INTERVERTEBRAL DISC N/A 12/27/2022    Procedure: REPLACEMENT, INTERVERTEBRAL DISC, CERVICAL, TOTAL;  Surgeon: Daniel Melissa MD;  Location: [facility] OR;  Service: Neurosurgery;  Laterality: N/A;  C5-6, C6-7 TOTAL DISC REPLACEMENT, ACDF IF NECESSARY  NTI //  JAY    TRIGGER FINGER RELEASE      left 3rd finger       Current Outpatient Medications   Medication Sig    ascorbic acid, vitamin C, (VITAMIN C) 1000 MG tablet 0    aspirin (ECOTRIN) 81 MG EC tablet Take 81 mg by mouth once daily.    bisacodyL (DULCOLAX) 5 mg EC tablet Take 1 tablet (5 mg total) by mouth once daily.    calcium carbonate (CALCIUM 500 ORAL)     cholecalciferol, vitamin D3, 125 mcg (5,000 unit) capsule Take 5,000 Units by mouth once daily.    coenzyme Q10 100 mg capsule 0    cyanocobalamin (VITAMIN B-12) 1000 MCG tablet Take 1,000 mcg by mouth every Mon, Wed, Fri.    estradioL (ESTRACE) 0.01 % (0.1 mg/gram) vaginal cream Place vaginally.    fenofibrate (TRICOR) 54 MG tablet Take 54 mg by mouth once daily.    flecainide (TAMBOCOR) 50 MG Tab Take 50 mg by mouth 2 (two) times daily.    glimepiride (AMARYL) 2 MG tablet Take 4 mg by mouth daily with breakfast. 2mg at bedtime    glucosamine coleman 2KCl-chondroit  750-600 mg Tab     levothyroxine (SYNTHROID) 125 MCG tablet SMARTSI Tablet(s) By Mouth 6 Times a Week    magnesium gluconate 27.5 mg magne- sium (500 mg) Tab Take by mouth once.    metoprolol succinate (TOPROL-XL) 25 MG 24 hr tablet Take 25 mg by mouth 2 (two) times daily.    metoprolol tartrate (LOPRESSOR) 25 MG tablet     niacin 500 MG CpSR Take 1,000 mg by mouth every evening.    nitrofurantoin, macrocrystal-monohydrate, (MACROBID) 100 MG capsule Take 100 mg by mouth 2 (two) times daily.    omega-3 acid ethyl esters (LOVAZA) 1 gram capsule Take 2 capsules by mouth 2 (two) times daily.    ONETOUCH DELICA PLUS LANCET 33 gauge Misc Apply topically.    ONETOUCH VERIO TEST STRIPS Strp SMARTSIG:Via Meter    pantoprazole (PROTONIX) 20 MG tablet Take 20 mg by mouth once daily.    rosuvastatin (CRESTOR) 10 MG tablet Take 10 mg by mouth once daily.    sertraline (ZOLOFT) 100 MG tablet Take 100 mg by mouth once daily.    solifenacin (VESICARE) 5 MG tablet Take 5 mg by mouth every morning.    ubiquinol (ACTIVE Q ORAL)     vitamin E 400 UNIT capsule Take 400 Units by mouth once daily.    zinc gluconate 50 mg tablet 1 tablet Orally Once a day for 30 day(s)    zinc sulfate (ZINC-15 ORAL) Take 50 mg by mouth once.    oxybutynin (DITROPAN-XL) 5 MG TR24 Take 5 mg by mouth once daily.    sucralfate (CARAFATE) 1 gram tablet 1 tablet on an empty stomach Orally Twice a day for 14 days (Patient not taking: Reported on 2025)     No current facility-administered medications for this visit.       Review of patient's allergies indicates:   Allergen Reactions    Adhesive tape-silicones        Family History   Problem Relation Name Age of Onset    Diabetes Mellitus Mother Angelina     Heart disease Mother [place]     Hyperlipidemia Mother Angelina     Cancer Mother Angelina     Diabetes Mother Angelina     Thyroid disease Mother [place]     Osteoarthritis Mother Angelina     Diabetes Mellitus Father Oswaldo Anderson     Heart disease Father Oswaldo [place]      Hypertension Father Oswaldo [place]     Diabetes Father Oswaldo Anderson     Osteoarthritis Father Oswaldo Anderson     Arthritis Brother Kamlesh     Diabetes Brother Kamlesh     Arthritis Brother Daniel     Osteoarthritis Brother Daniel     Diabetes Brother Fran        Social History[1]        Review of Systems:    Constitution: Negative for chills, fever, and sweats.  Negative for unexplained weight loss.    HENT:  Negative for headaches and blurry vision.    Cardiovascular:Negative for chest pain or irregular heart beat. Negative for hypertension.    Respiratory:  Negative for cough and shortness of breath.    Gastrointestinal: Negative for abdominal pain, heartburn, melena, nausea, and vomitting.    Genitourinary:  Negative bladder incontinence and dysuria.    Musculoskeletal:  See HPI    Neurological: Negative for numbness.    Psychiatric/Behavioral: Negative for depression.  The patient is not nervous/anxious.      Endocrine: Negative for polyuria    Hematologic/Lymphatic: Negative for bleeding problem.  Does not bruise/bleed easily.    Skin: Negative for poor would healing and rash      Physical Examination:    Vital Signs:    Vitals:    08/05/25 1422   BP: 100/63   Pulse: 62       There is no height or weight on file to calculate BMI.    General: No acute distress, alert and oriented, healthy appearing    HEENT: Head is atraumatic, mucous membranes are moist    Neck: Supples, no JVD    Cardiovascular: Palpable dorsalis pedis and posterior tibial pulses, regular rate and rhythm to those pulses    Lungs: Breathing non-labored    Skin: no rashes appreciated    Neurologic: Can flex and extend knees, ankles, and toes. Sensation is grossly intact    Left hip: Range of motion of the left hip without significant discomfort or pain.  Brisk cap refill disappeared sensation intact of the foot.    X-rays:  Three views left hip reviewed today.  Patient's implants appear well fixed.  No signs of loosening or subsidence noted.     Assessment::   Status post left total hip arthroplasty    Plan:  Patient continues to have some pain in the left hip.  She is not asymptomatic at this point in time.  We had discussed all of the treatment options.  She is happy with her recovery despite her ongoing mild discomfort.  See her back on an as-needed basis.  She continues to have issues with the shoulder we will refer to 1 of my shoulder colleagues for this.    This note was generated with the assistance of ambient listening technology. Verbal consent was obtained by the patient and accompanying visitor(s) for the recording of patient appointment to facilitate this note. I attest to having reviewed and edited the generated note for accuracy, though some syntax or spelling errors may persist. Please contact the author of this note for any clarification.       This note was created using HItviews voice recognition software that occasionally misinterpreted phrases or words.    Consult note is delivered via Epic messaging service.         [1]   Social History  Socioeconomic History    Marital status:    Tobacco Use    Smoking status: Former     Current packs/day: 0.50     Average packs/day: 0.5 packs/day for 30.0 years (15.0 ttl pk-yrs)     Types: Cigarettes    Smokeless tobacco: Never    Tobacco comments:     Quit 20 yrs+   Substance and Sexual Activity    Alcohol use: Never    Drug use: Never    Sexual activity: Not Currently     Birth control/protection: Post-menopausal     Social Drivers of Health     Financial Resource Strain: Low Risk  (12/28/2022)    Overall Financial Resource Strain (CARDIA)     Difficulty of Paying Living Expenses: Not hard at all   Food Insecurity: Unknown (12/28/2022)    Hunger Vital Sign     Ran Out of Food in the Last Year: Never true   Transportation Needs: No Transportation Needs (12/28/2022)    PRAPARE - Transportation     Lack of Transportation (Medical): No     Lack of Transportation (Non-Medical): No   Housing Stability: Unknown  (12/28/2022)    Housing Stability Vital Sign     Unable to Pay for Housing in the Last Year: No     Unstable Housing in the Last Year: No

## 2025-08-07 DIAGNOSIS — K21.9 GASTROESOPHAGEAL REFLUX DISEASE: Primary | ICD-10-CM

## 2025-08-07 DIAGNOSIS — R13.14 DYSPHAGIA, PHARYNGOESOPHAGEAL: ICD-10-CM

## 2025-08-07 DIAGNOSIS — D50.9 IRON DEFICIENCY ANEMIA: ICD-10-CM

## 2025-08-14 ENCOUNTER — OFFICE VISIT (OUTPATIENT)
Dept: ORTHOPEDICS | Facility: CLINIC | Age: 70
End: 2025-08-14
Payer: MEDICARE

## 2025-08-14 ENCOUNTER — HOSPITAL ENCOUNTER (OUTPATIENT)
Dept: RADIOLOGY | Facility: CLINIC | Age: 70
Discharge: HOME | End: 2025-08-14
Attending: ORTHOPAEDIC SURGERY
Payer: MEDICARE

## 2025-08-14 VITALS
DIASTOLIC BLOOD PRESSURE: 67 MMHG | HEIGHT: 61 IN | WEIGHT: 154.31 LBS | BODY MASS INDEX: 29.13 KG/M2 | HEART RATE: 52 BPM | SYSTOLIC BLOOD PRESSURE: 116 MMHG

## 2025-08-14 DIAGNOSIS — M25.512 LEFT SHOULDER PAIN, UNSPECIFIED CHRONICITY: ICD-10-CM

## 2025-08-14 DIAGNOSIS — M67.814 TENDINOSIS OF LEFT ROTATOR CUFF: Primary | ICD-10-CM

## 2025-08-14 PROCEDURE — 73030 X-RAY EXAM OF SHOULDER: CPT | Mod: LT,,, | Performed by: ORTHOPAEDIC SURGERY

## 2025-08-14 RX ORDER — BETAMETHASONE SODIUM PHOSPHATE AND BETAMETHASONE ACETATE 3; 3 MG/ML; MG/ML
6 INJECTION, SUSPENSION INTRA-ARTICULAR; INTRALESIONAL; INTRAMUSCULAR; SOFT TISSUE
Status: DISCONTINUED | OUTPATIENT
Start: 2025-08-14 | End: 2025-08-14 | Stop reason: HOSPADM

## 2025-08-14 RX ORDER — LIDOCAINE HYDROCHLORIDE 20 MG/ML
2 INJECTION, SOLUTION INFILTRATION; PERINEURAL
Status: DISCONTINUED | OUTPATIENT
Start: 2025-08-14 | End: 2025-08-14 | Stop reason: HOSPADM

## 2025-08-14 RX ADMIN — BETAMETHASONE SODIUM PHOSPHATE AND BETAMETHASONE ACETATE 6 MG: 3; 3 INJECTION, SUSPENSION INTRA-ARTICULAR; INTRALESIONAL; INTRAMUSCULAR; SOFT TISSUE at 01:08

## 2025-08-14 RX ADMIN — LIDOCAINE HYDROCHLORIDE 2 MG: 20 INJECTION, SOLUTION INFILTRATION; PERINEURAL at 01:08

## 2025-09-04 ENCOUNTER — HOSPITAL ENCOUNTER (OUTPATIENT)
Dept: RADIOLOGY | Facility: HOSPITAL | Age: 70
Discharge: HOME | End: 2025-09-04
Attending: INTERNAL MEDICINE
Payer: MEDICARE

## 2025-09-04 DIAGNOSIS — R13.14 DYSPHAGIA, PHARYNGOESOPHAGEAL: ICD-10-CM

## 2025-09-04 DIAGNOSIS — K21.9 GASTROESOPHAGEAL REFLUX DISEASE: ICD-10-CM

## 2025-09-04 DIAGNOSIS — D50.9 IRON DEFICIENCY ANEMIA: ICD-10-CM

## 2025-09-04 PROCEDURE — A9541 TC99M SULFUR COLLOID: HCPCS | Performed by: INTERNAL MEDICINE

## 2025-09-04 PROCEDURE — 78264 GASTRIC EMPTYING IMG STUDY: CPT | Mod: TC

## 2025-09-04 RX ORDER — TECHNETIUM TC 99M SULFUR COLLOID 2 MG
2 KIT MISCELLANEOUS
Status: COMPLETED | OUTPATIENT
Start: 2025-09-04 | End: 2025-09-04

## 2025-09-04 RX ADMIN — TECHNETIUM TC 99M SULFUR COLLOID 2 MILLICURIE: KIT at 07:09

## (undated) DEVICE — SUT MONO 2-0 CT-1 VIL

## (undated) DEVICE — ADHESIVE DERMABOND ADVANCED

## (undated) DEVICE — DURAPREP SURG SCRUB 26ML

## (undated) DEVICE — NDL ANES SPINAL 18X3.5ST 18G

## (undated) DEVICE — SUT STRATAFIX SPRL UD 3-0 27IN

## (undated) DEVICE — TRAY CATH FOL SIL URIMTR 16FR

## (undated) DEVICE — TAPE ADH MEDIPORE 4 X 10YDS

## (undated) DEVICE — BLADE SAG DUAL CUT 18X90X1.35

## (undated) DEVICE — DRAPE FLUID WARMER 44X44IN

## (undated) DEVICE — ELECTRODE PATIENT RETURN DISP

## (undated) DEVICE — GLOVE PROTEXIS PI SYN SURG 6.0

## (undated) DEVICE — TUBING SILICON CLR 3/16IN 10FT

## (undated) DEVICE — DRAPE ORTH SPLIT 77X108IN

## (undated) DEVICE — SOL NACL IRR 1000ML BTL

## (undated) DEVICE — STRIP MEDI WND CLSR 1/2X4IN

## (undated) DEVICE — COVER HD BACK TABLE 6FT

## (undated) DEVICE — GLOVE SENSICARE PI GRN 8.5

## (undated) DEVICE — APPLICATOR CHLORAPREP ORN 26ML

## (undated) DEVICE — GLOVE SENSICARE PI MICRO 6.5

## (undated) DEVICE — DRAPE MEDIUM SHEET 40X70IN

## (undated) DEVICE — GLOVE SIGNATURE ESSNTL LTX 8.5

## (undated) DEVICE — GLOVE PROTEXIS PI SYN SURG 7.5

## (undated) DEVICE — GOWN POLY REINF X-LONG 2XL

## (undated) DEVICE — KIT SURGICAL TURNOVER

## (undated) DEVICE — GAUZE SPONGE 4X4 12PLY

## (undated) DEVICE — SOL NORMAL USPCA 0.9%

## (undated) DEVICE — KIT TOTAL HIP HLGC

## (undated) DEVICE — DRESSING TRANS 4X4 TEGADERM

## (undated) DEVICE — GLOVE SENSICARE PI GRN 7

## (undated) DEVICE — DRAIN JACKSON PRATT TRCR 10FR

## (undated) DEVICE — SUT BLU BR 2 TAPERD NDL 1/2

## (undated) DEVICE — KIT CHECKPOINT TIBIAL

## (undated) DEVICE — DRAPE TOP 53X102IN

## (undated) DEVICE — PAD ABDOMINAL STERILE 8X10IN

## (undated) DEVICE — NDL HYPO 22GX1 1/2 SYR 10ML LL

## (undated) DEVICE — ELECTRODE BLADE INSULATED 1 IN

## (undated) DEVICE — DRAPE SURG W/TWL 17 5/8X23

## (undated) DEVICE — GLOVE PROTEXIS PI SYN SURG 6.5

## (undated) DEVICE — KIT TRACKING VIZADISC HIP

## (undated) DEVICE — SUT MONOCRYL 3-0 PS-2 UND

## (undated) DEVICE — DRAPE STERI U-SHAPED 47X51IN

## (undated) DEVICE — Device

## (undated) DEVICE — GOWN SMARTSLEEVE AAMI LVL4 LG

## (undated) DEVICE — KIT SURGIFLO HEMOSTATIC MATRIX

## (undated) DEVICE — DRAPE FULL SHEET 70X100IN

## (undated) DEVICE — DRAPE C-ARM COVER EZ 36X28IN

## (undated) DEVICE — SYR 10CC LUER LOCK

## (undated) DEVICE — RETRIEVER SUTURE HEWSON DISP

## (undated) DEVICE — KIT DRAPE RIO ONE PIECE W/POCK

## (undated) DEVICE — SPNG CHERRY DISECT XRAY DTECT

## (undated) DEVICE — SOL POVIDONE IODINE PCH 3/4OZ

## (undated) DEVICE — TUBING IRR BIPOLAR CORD 12FT

## (undated) DEVICE — NDL QUINCKE SPINAL YEL 20GX3IN

## (undated) DEVICE — RESERVOIR JACKSON-PRATT 100CC

## (undated) DEVICE — DEVICE STRATAFIX SYMMETRIC +

## (undated) DEVICE — MARKER WRITESITE SKIN CHLRAPRP

## (undated) DEVICE — GLOVE PROTEXIS BLUE LATEX 6.5

## (undated) DEVICE — DRAPE INCISE IOBAN 2 23X23IN

## (undated) DEVICE — SHEET XLGE DRAPE

## (undated) DEVICE — PIN BONE 4 X 140MM STERILE
Type: IMPLANTABLE DEVICE | Site: HIP | Status: NON-FUNCTIONAL
Removed: 2024-07-08

## (undated) DEVICE — SUT STRATAFIX 4-0 30CM PS-2

## (undated) DEVICE — POSITIONER HEAD ADULT

## (undated) DEVICE — DRAPE C-ARMOR EQUIPMENT COVER

## (undated) DEVICE — GLOVE PROTEXIS BLUE LATEX 8

## (undated) DEVICE — SPONGE COTTON TRAY 4X4IN

## (undated) DEVICE — GLOVE PROTEXIS BLUE LATEX 7

## (undated) DEVICE — SOL .9NACL PF 100 ML

## (undated) DEVICE — GLOVE SENSICARE PI ORTHO LT 8

## (undated) DEVICE — DRESSING XEROFORM NONADH 1X8IN

## (undated) DEVICE — SUT VICRYL PLUS 3-0 SH 18IN

## (undated) DEVICE — DRAPE OPMI STERILE